# Patient Record
Sex: MALE | Race: WHITE | NOT HISPANIC OR LATINO
[De-identification: names, ages, dates, MRNs, and addresses within clinical notes are randomized per-mention and may not be internally consistent; named-entity substitution may affect disease eponyms.]

---

## 2018-11-07 ENCOUNTER — APPOINTMENT (OUTPATIENT)
Dept: INTERNAL MEDICINE | Facility: CLINIC | Age: 58
End: 2018-11-07
Payer: MEDICAID

## 2018-11-07 VITALS
DIASTOLIC BLOOD PRESSURE: 74 MMHG | SYSTOLIC BLOOD PRESSURE: 130 MMHG | BODY MASS INDEX: 20.44 KG/M2 | WEIGHT: 138 LBS | HEIGHT: 69 IN | HEART RATE: 104 BPM

## 2018-11-07 DIAGNOSIS — Z82.3 FAMILY HISTORY OF STROKE: ICD-10-CM

## 2018-11-07 DIAGNOSIS — M54.16 RADICULOPATHY, LUMBAR REGION: ICD-10-CM

## 2018-11-07 DIAGNOSIS — Z82.0 FAMILY HISTORY OF EPILEPSY AND OTHER DISEASES OF THE NERVOUS SYSTEM: ICD-10-CM

## 2018-11-07 DIAGNOSIS — F17.200 NICOTINE DEPENDENCE, UNSPECIFIED, UNCOMPLICATED: ICD-10-CM

## 2018-11-07 DIAGNOSIS — Z78.9 OTHER SPECIFIED HEALTH STATUS: ICD-10-CM

## 2018-11-07 DIAGNOSIS — Z72.3 LACK OF PHYSICAL EXERCISE: ICD-10-CM

## 2018-11-07 PROCEDURE — 90686 IIV4 VACC NO PRSV 0.5 ML IM: CPT

## 2018-11-07 PROCEDURE — G0008: CPT

## 2018-11-07 PROCEDURE — 36415 COLL VENOUS BLD VENIPUNCTURE: CPT

## 2018-11-07 PROCEDURE — 99204 OFFICE O/P NEW MOD 45 MIN: CPT | Mod: 25

## 2018-11-07 NOTE — PLAN
[FreeTextEntry1] : detailed discussion and instructions given for cervical spine posture, ROM and resistance exercises, muscle relaxation, heat, nsaids prn. Check CXR and C spine for occult process , in light of smoking hx.  and lack of overt traumatic ppt event.

## 2018-11-07 NOTE — HISTORY OF PRESENT ILLNESS
[FreeTextEntry1] : Initial Internal Medicine evaluation at [facility] [de-identified] : 59 y/o describes a main concern about his upper back discomfort x 2 wks ( see below). Otherwise,  no known change in medical status. Significant past hx outlined below

## 2018-11-07 NOTE — PHYSICAL EXAM
[No Acute Distress] : no acute distress [Normal Sclera/Conjunctiva] : normal sclera/conjunctiva [Normal Outer Ear/Nose] : the outer ears and nose were normal in appearance [Normal Oropharynx] : the oropharynx was normal [No Respiratory Distress] : no respiratory distress  [Clear to Auscultation] : lungs were clear to auscultation bilaterally [Normal Rate] : normal rate  [Normal S1, S2] : normal S1 and S2 [No Murmur] : no murmur heard [No Varicosities] : no varicosities [No Edema] : there was no peripheral edema [Soft] : abdomen soft [Non-distended] : non-distended [No Masses] : no abdominal mass palpated [No HSM] : no HSM [Normal Bowel Sounds] : normal bowel sounds [No Hernias] : no hernias [Normal Posterior Cervical Nodes] : no posterior cervical lymphadenopathy [Normal Anterior Cervical Nodes] : no anterior cervical lymphadenopathy [No CVA Tenderness] : no CVA  tenderness [No Spinal Tenderness] : no spinal tenderness [No Joint Swelling] : no joint swelling [Grossly Normal Strength/Tone] : grossly normal strength/tone [Normal Gait] : normal gait [No Focal Deficits] : no focal deficits [Speech Grossly Normal] : speech grossly normal [Normal Affect] : the affect was normal [Alert and Oriented x3] : oriented to person, place, and time [de-identified] : thin  [de-identified] : dental disease [de-identified] : decreased rom [de-identified] : midline incision umbilicus-suprapubic [FreeTextEntry1] : deferred [de-identified] : mild dorsal dextro curve [de-identified] : mult acne scars; scratches and areas of irritation

## 2018-11-07 NOTE — REVIEW OF SYSTEMS
[Patient Intake Form Reviewed] : Patient intake form was reviewed [Negative] : Heme/Lymph [FreeTextEntry3] : glasses near and far

## 2018-11-13 LAB
ALBUMIN SERPL ELPH-MCNC: 4 G/DL
ALP BLD-CCNC: 69 U/L
ALT SERPL-CCNC: 19 U/L
ANION GAP SERPL CALC-SCNC: 17 MMOL/L
AST SERPL-CCNC: 23 U/L
BASOPHILS # BLD AUTO: 0.08 K/UL
BASOPHILS NFR BLD AUTO: 1.2 %
BILIRUB SERPL-MCNC: 0.6 MG/DL
BUN SERPL-MCNC: 8 MG/DL
CALCIUM SERPL-MCNC: 9.6 MG/DL
CHLORIDE SERPL-SCNC: 102 MMOL/L
CHOLEST SERPL-MCNC: 177 MG/DL
CHOLEST/HDLC SERPL: 2.7 RATIO
CO2 SERPL-SCNC: 24 MMOL/L
CREAT SERPL-MCNC: 0.67 MG/DL
EOSINOPHIL # BLD AUTO: 0.07 K/UL
EOSINOPHIL NFR BLD AUTO: 1.1 %
ERYTHROCYTE [SEDIMENTATION RATE] IN BLOOD BY WESTERGREN METHOD: 8 MM/HR
FERRITIN SERPL-MCNC: 230 NG/ML
FOLATE SERPL-MCNC: >20 NG/ML
GLUCOSE SERPL-MCNC: 141 MG/DL
HBA1C MFR BLD HPLC: 5.1 %
HCT VFR BLD CALC: 44.7 %
HDLC SERPL-MCNC: 65 MG/DL
HGB BLD-MCNC: 15.1 G/DL
IMM GRANULOCYTES NFR BLD AUTO: 0.3 %
IRON SATN MFR SERPL: 44 %
IRON SERPL-MCNC: 148 UG/DL
LDLC SERPL CALC-MCNC: 73 MG/DL
LYMPHOCYTES # BLD AUTO: 1.21 K/UL
LYMPHOCYTES NFR BLD AUTO: 18.7 %
MAN DIFF?: NORMAL
MCHC RBC-ENTMCNC: 31.5 PG
MCHC RBC-ENTMCNC: 33.8 GM/DL
MCV RBC AUTO: 93.1 FL
MONOCYTES # BLD AUTO: 0.21 K/UL
MONOCYTES NFR BLD AUTO: 3.3 %
NEUTROPHILS # BLD AUTO: 4.87 K/UL
NEUTROPHILS NFR BLD AUTO: 75.4 %
PLATELET # BLD AUTO: 266 K/UL
POTASSIUM SERPL-SCNC: 3.7 MMOL/L
PROT SERPL-MCNC: 6.3 G/DL
PSA SERPL-MCNC: 0.56 NG/ML
RBC # BLD: 4.8 M/UL
RBC # FLD: 13.4 %
SODIUM SERPL-SCNC: 143 MMOL/L
T4 FREE SERPL-MCNC: 1.2 NG/DL
TIBC SERPL-MCNC: 333 UG/DL
TRIGL SERPL-MCNC: 194 MG/DL
TSH SERPL-ACNC: 0.69 UIU/ML
UIBC SERPL-MCNC: 185 UG/DL
URATE SERPL-MCNC: 6.2 MG/DL
VIT B12 SERPL-MCNC: 524 PG/ML
WBC # FLD AUTO: 6.46 K/UL

## 2018-11-23 ENCOUNTER — OUTPATIENT (OUTPATIENT)
Dept: OUTPATIENT SERVICES | Facility: HOSPITAL | Age: 58
LOS: 1 days | End: 2018-11-23
Payer: MEDICAID

## 2018-11-23 PROCEDURE — 71046 X-RAY EXAM CHEST 2 VIEWS: CPT | Mod: 26

## 2018-11-23 PROCEDURE — 71046 X-RAY EXAM CHEST 2 VIEWS: CPT

## 2018-11-23 PROCEDURE — 72040 X-RAY EXAM NECK SPINE 2-3 VW: CPT | Mod: 26

## 2018-11-23 PROCEDURE — 72040 X-RAY EXAM NECK SPINE 2-3 VW: CPT

## 2018-12-12 ENCOUNTER — APPOINTMENT (OUTPATIENT)
Dept: INTERNAL MEDICINE | Facility: CLINIC | Age: 58
End: 2018-12-12
Payer: MEDICAID

## 2018-12-12 VITALS
BODY MASS INDEX: 20.38 KG/M2 | WEIGHT: 138 LBS | SYSTOLIC BLOOD PRESSURE: 130 MMHG | HEART RATE: 80 BPM | DIASTOLIC BLOOD PRESSURE: 74 MMHG

## 2018-12-12 DIAGNOSIS — Z98.890 OTHER SPECIFIED POSTPROCEDURAL STATES: ICD-10-CM

## 2018-12-12 DIAGNOSIS — Z87.898 PERSONAL HISTORY OF OTHER SPECIFIED CONDITIONS: ICD-10-CM

## 2018-12-12 DIAGNOSIS — Z92.29 PERSONAL HISTORY OF OTHER DRUG THERAPY: ICD-10-CM

## 2018-12-12 PROCEDURE — 99214 OFFICE O/P EST MOD 30 MIN: CPT

## 2018-12-13 PROBLEM — Z92.29 HISTORY OF INFLUENZA VACCINATION: Status: RESOLVED | Noted: 2018-11-07 | Resolved: 2018-12-13

## 2018-12-13 PROBLEM — Z87.898 HISTORY OF TACHYCARDIA: Status: RESOLVED | Noted: 2018-11-07 | Resolved: 2018-12-13

## 2018-12-13 PROBLEM — Z98.890 HISTORY OF COLONOSCOPY: Status: RESOLVED | Noted: 2018-12-13 | Resolved: 2018-12-13

## 2018-12-13 PROBLEM — Z87.898 HISTORY OF WEIGHT LOSS: Status: RESOLVED | Noted: 2018-11-07 | Resolved: 2018-12-13

## 2018-12-13 NOTE — HISTORY OF PRESENT ILLNESS
[FreeTextEntry1] : f/u on neck and back sxs [de-identified] : Back and neck sxs improved with exercises and posture/bed changes. Reviewed xray findings:\par 1. C spine : degen arthritic changes C3-6; c/w sxs\par 2. CXR: dorsal spine arthritic changes as well. There is a report of a lerge right sided possible cyst like structure. This was not evident to me on review of the films. I called the radiologist who was not available and left a message for her. The films were reviewed by another radiologist who concurred with my finding of no cyst on either the AP or lat film. There are no PE changes, hx,  or sxs relative to this finding

## 2018-12-13 NOTE — PHYSICAL EXAM
[No Respiratory Distress] : no respiratory distress  [Clear to Auscultation] : lungs were clear to auscultation bilaterally [de-identified] : improved range in motion; rhomboid muscle tightness relieved

## 2019-02-27 ENCOUNTER — APPOINTMENT (OUTPATIENT)
Dept: INTERNAL MEDICINE | Facility: CLINIC | Age: 59
End: 2019-02-27
Payer: MEDICAID

## 2019-02-27 VITALS
HEIGHT: 69 IN | HEART RATE: 90 BPM | BODY MASS INDEX: 20.44 KG/M2 | DIASTOLIC BLOOD PRESSURE: 78 MMHG | WEIGHT: 138 LBS | SYSTOLIC BLOOD PRESSURE: 120 MMHG

## 2019-02-27 DIAGNOSIS — R93.89 ABNORMAL FINDINGS ON DIAGNOSTIC IMAGING OF OTHER SPECIFIED BODY STRUCTURES: ICD-10-CM

## 2019-02-27 DIAGNOSIS — L40.9 PSORIASIS, UNSPECIFIED: ICD-10-CM

## 2019-02-27 DIAGNOSIS — M47.814 SPONDYLOSIS W/OUT MYELOPATHY OR RADICULOPATHY, THORACIC REGION: ICD-10-CM

## 2019-02-27 PROCEDURE — 99214 OFFICE O/P EST MOD 30 MIN: CPT

## 2019-02-27 NOTE — HISTORY OF PRESENT ILLNESS
[FreeTextEntry1] : medical f/u - few issues [de-identified] : 1. psoriasis on his shins is sometimes flaking and itching\par 2. low back stiffness. Has been effectively treated with a lumbar support belt which is not keeping its Velcro close. a replacement is not available through his insurance plan, as they are over-the-counter. However, if the Velcro was cleaned, it would work effectively\par 3. 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 Neck and upper back symptoms have resolved with exercises - to continue

## 2019-02-27 NOTE — PHYSICAL EXAM
[Normal Sclera/Conjunctiva] : normal sclera/conjunctiva [No Respiratory Distress] : no respiratory distress  [Clear to Auscultation] : lungs were clear to auscultation bilaterally [de-identified] : poor grooming; odor from body/clothing [de-identified] : improved range in motion; rhomboid muscle tightness relieved [de-identified] : stiffness with full range of motion [de-identified] : psoriatic plaque bilaterally, anterior shins

## 2019-02-27 NOTE — PLAN
[FreeTextEntry1] : 1. use lumbar support as needed.\par 2. For psoriasis, triamcinolone cream, apply after shower each morning and can reapply in the evening before sleep. Maintain good body hygiene.\par 3. When sitting for long time due to neck, shoulder, and back stretches and range of motion to prevent stiffness\par 4. cautioned re personal hygeine and clothing as he starts new job working in close quarters with others

## 2019-06-05 ENCOUNTER — APPOINTMENT (OUTPATIENT)
Dept: INTERNAL MEDICINE | Facility: CLINIC | Age: 59
End: 2019-06-05
Payer: MEDICAID

## 2019-06-05 VITALS
HEART RATE: 105 BPM | HEIGHT: 69 IN | WEIGHT: 132 LBS | DIASTOLIC BLOOD PRESSURE: 84 MMHG | BODY MASS INDEX: 19.55 KG/M2 | SYSTOLIC BLOOD PRESSURE: 125 MMHG

## 2019-06-05 VITALS — SYSTOLIC BLOOD PRESSURE: 112 MMHG | HEART RATE: 113 BPM | DIASTOLIC BLOOD PRESSURE: 71 MMHG

## 2019-06-05 PROCEDURE — 99214 OFFICE O/P EST MOD 30 MIN: CPT

## 2019-06-06 NOTE — PHYSICAL EXAM
[No Acute Distress] : no acute distress [Normal Sclera/Conjunctiva] : normal sclera/conjunctiva [No Respiratory Distress] : no respiratory distress  [Clear to Auscultation] : lungs were clear to auscultation bilaterally [Regular Rhythm] : with a regular rhythm [No Murmur] : no murmur heard [No Edema] : there was no peripheral edema [Non Tender] : non-tender [Soft] : abdomen soft [Non-distended] : non-distended [No Masses] : no abdominal mass palpated [No HSM] : no HSM [No CVA Tenderness] : no CVA  tenderness [Normal Affect] : the affect was normal [Normal Mood] : the mood was normal [Normal Insight/Judgement] : insight and judgment were intact [de-identified] : looks about the same; no orthostatic symptoms [de-identified] : thin firm abd with sl hyper active BS of normal pitch. Non tender on direct or rebound [de-identified] : similar tachycardia to last time [de-identified] : slight gittery but w/o fever or drug / alcohol use/withdrawal  [de-identified] : not tenting

## 2019-06-06 NOTE — HISTORY OF PRESENT ILLNESS
[de-identified] : he had been doing very well at work.Unfortunately, the chart came to in and last month and his diet and fluid excess or limited. He is uncertain what was consumed on Thursday, May 30.However, that night he noted the onset of severe crampy abdominal pain and profuse, non-bloody, watery diarrhea. He felt fevish. There was no nausea or vomiting, but he did not eat. He was unable to leave his home due to the numerous bowel movements. The cramping and diarrhea persisted over the next 4 days and only began to cleark, enabling him to walk 3 blocks, ovary, to this office. [FreeTextEntry1] : intestinal upset

## 2019-07-05 ENCOUNTER — RX CHANGE (OUTPATIENT)
Age: 59
End: 2019-07-05

## 2019-07-15 ENCOUNTER — CLINICAL ADVICE (OUTPATIENT)
Age: 59
End: 2019-07-15

## 2019-07-16 ENCOUNTER — OUTPATIENT (OUTPATIENT)
Dept: OUTPATIENT SERVICES | Facility: HOSPITAL | Age: 59
LOS: 1 days | End: 2019-07-16
Payer: MEDICAID

## 2019-07-16 PROCEDURE — 72050 X-RAY EXAM NECK SPINE 4/5VWS: CPT

## 2019-07-16 PROCEDURE — 72050 X-RAY EXAM NECK SPINE 4/5VWS: CPT | Mod: 26

## 2019-10-30 ENCOUNTER — APPOINTMENT (OUTPATIENT)
Dept: INTERNAL MEDICINE | Facility: CLINIC | Age: 59
End: 2019-10-30
Payer: MEDICAID

## 2019-10-30 VITALS
HEART RATE: 74 BPM | DIASTOLIC BLOOD PRESSURE: 73 MMHG | SYSTOLIC BLOOD PRESSURE: 118 MMHG | BODY MASS INDEX: 19.55 KG/M2 | WEIGHT: 132 LBS | HEIGHT: 69 IN

## 2019-10-30 DIAGNOSIS — K52.9 NONINFECTIVE GASTROENTERITIS AND COLITIS, UNSPECIFIED: ICD-10-CM

## 2019-10-30 PROCEDURE — G0008: CPT

## 2019-10-30 PROCEDURE — 36415 COLL VENOUS BLD VENIPUNCTURE: CPT

## 2019-10-30 PROCEDURE — 99214 OFFICE O/P EST MOD 30 MIN: CPT | Mod: 25

## 2019-10-30 PROCEDURE — 90686 IIV4 VACC NO PRSV 0.5 ML IM: CPT

## 2019-11-01 PROBLEM — K52.9 GASTROENTERITIS, ACUTE: Status: RESOLVED | Noted: 2019-06-05 | Resolved: 2019-11-01

## 2019-11-01 NOTE — HISTORY OF PRESENT ILLNESS
[FreeTextEntry1] : medical f/u [de-identified] : 1. Has moved to [facility] shelter with both room and board in the [facility] and then to [facility] in 12/2019. Has  assisting him. 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 Needs FLu shot, testing for TB for purposes of shelter. \par 2. Has physical therapy weekly - helpful with neck and upper back; needs referral for lower back therapy - stiffness and h/o sciatica

## 2019-11-01 NOTE — PHYSICAL EXAM
[Well-Appearing] : well-appearing [Normal Sclera/Conjunctiva] : normal sclera/conjunctiva [No Respiratory Distress] : no respiratory distress  [Clear to Auscultation] : lungs were clear to auscultation bilaterally [Normal Rate] : normal rate  [No Edema] : there was no peripheral edema [Normal] : soft, non-tender, non-distended, no masses palpated, no HSM and normal bowel sounds [No CVA Tenderness] : no CVA  tenderness [No Spinal Tenderness] : no spinal tenderness [Grossly Normal Strength/Tone] : grossly normal strength/tone [No Focal Deficits] : no focal deficits [Speech Grossly Normal] : speech grossly normal [Normal Mood] : the mood was normal [de-identified] : reasonable rom [de-identified] : accepts his situation and working to get better

## 2019-11-02 LAB
M TB IFN-G BLD-IMP: NEGATIVE
QUANTIFERON TB PLUS MITOGEN MINUS NIL: >10 IU/ML
QUANTIFERON TB PLUS NIL: 0.03 IU/ML
QUANTIFERON TB PLUS TB1 MINUS NIL: 0 IU/ML
QUANTIFERON TB PLUS TB2 MINUS NIL: 0 IU/ML

## 2020-01-21 ENCOUNTER — APPOINTMENT (OUTPATIENT)
Dept: INTERNAL MEDICINE | Facility: CLINIC | Age: 60
End: 2020-01-21
Payer: MEDICAID

## 2020-01-21 VITALS
WEIGHT: 145 LBS | SYSTOLIC BLOOD PRESSURE: 122 MMHG | DIASTOLIC BLOOD PRESSURE: 78 MMHG | HEART RATE: 82 BPM | HEIGHT: 70 IN | BODY MASS INDEX: 20.76 KG/M2

## 2020-01-21 DIAGNOSIS — Z00.00 ENCOUNTER FOR GENERAL ADULT MEDICAL EXAMINATION W/OUT ABNORMAL FINDINGS: ICD-10-CM

## 2020-01-21 DIAGNOSIS — M54.5 LOW BACK PAIN: ICD-10-CM

## 2020-01-21 DIAGNOSIS — G89.29 LOW BACK PAIN: ICD-10-CM

## 2020-01-21 PROCEDURE — 99214 OFFICE O/P EST MOD 30 MIN: CPT | Mod: 25

## 2020-01-21 PROCEDURE — 36415 COLL VENOUS BLD VENIPUNCTURE: CPT

## 2020-01-21 RX ORDER — NAPROXEN 500 MG/1
500 TABLET ORAL
Qty: 60 | Refills: 5 | Status: DISCONTINUED | COMMUNITY
Start: 2019-07-15 | End: 2019-11-21

## 2020-01-21 NOTE — PHYSICAL EXAM
[Well-Appearing] : well-appearing [No Respiratory Distress] : no respiratory distress  [Normal Rate] : normal rate  [Regular Rhythm] : with a regular rhythm [No Murmur] : no murmur heard [No Edema] : there was no peripheral edema [Soft] : abdomen soft [Non Tender] : non-tender [No HSM] : no HSM [No CVA Tenderness] : no CVA  tenderness [No Spinal Tenderness] : no spinal tenderness [Grossly Normal Strength/Tone] : grossly normal strength/tone [No Skin Lesions] : no skin lesions [No Focal Deficits] : no focal deficits [Speech Grossly Normal] : speech grossly normal [Alert and Oriented x3] : oriented to person, place, and time [de-identified] : better grooming than at onset 1 year ago; has gained weight from better food access [de-identified] : missing teeth [de-identified] : stiffness and dec ROM with extension, lateral bending and twisting movemtn [de-identified] : decreased lordotic lumbar curve [de-identified] : flexible back, hip, knee movements [de-identified] : anxious pressured affect; understands well but repetitive in thought expression; seemingly understands and with insight but perhaps situation so advanced that can't act on it

## 2020-01-21 NOTE — HISTORY OF PRESENT ILLNESS
[FreeTextEntry1] : medical f/u [de-identified] : From a medical standpoint, his active medical conditions are intermittent pain and stiffness in his neck from arthritic changes. There is an element of lumbar symptoms as well. He obtains benefit from Tylenol, heat packs, and physical therapy.\par most of his stress, which aggravates the spine symptoms, comes from his socioeconomic situation. He is without a job. He prefers one of the type. 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 He believeshis spine symptoms would interfere with physical labor abilities. He lives in a shelter and is receiving public assistance stipend and food stamps to cover beyond the free meals and rent, laundry, and bath access provided by the shelter.

## 2020-01-21 NOTE — ASSESSMENT
[FreeTextEntry1] : 30 minutes spent face to face with the patient in elicitation of symptoms and related factors, analysis and summation of medical conditions, explaining to,  and outlining management plans for, the patient.\par \par His improvement from being disheveled and underweight reflects success of  he receives. His not seeking full range of job market in current high employment economy suggests deficient judgement to manage life on his own

## 2020-01-21 NOTE — PLAN
[FreeTextEntry1] : venipuncture performed by me in my exam room\par \par update labs\par referrals for PT given\par Note to Shelter as described.

## 2020-01-24 LAB
ALBUMIN SERPL ELPH-MCNC: 4.8 G/DL
ALP BLD-CCNC: 83 U/L
ALT SERPL-CCNC: 23 U/L
ANION GAP SERPL CALC-SCNC: 13 MMOL/L
AST SERPL-CCNC: 22 U/L
BASOPHILS # BLD AUTO: 0.09 K/UL
BASOPHILS NFR BLD AUTO: 1 %
BILIRUB SERPL-MCNC: 0.3 MG/DL
BUN SERPL-MCNC: 13 MG/DL
CALCIUM SERPL-MCNC: 10.2 MG/DL
CHLORIDE SERPL-SCNC: 98 MMOL/L
CO2 SERPL-SCNC: 28 MMOL/L
CREAT SERPL-MCNC: 0.79 MG/DL
EOSINOPHIL # BLD AUTO: 0.27 K/UL
EOSINOPHIL NFR BLD AUTO: 2.9 %
ESTIMATED AVERAGE GLUCOSE: 111 MG/DL
GLUCOSE SERPL-MCNC: 89 MG/DL
HBA1C MFR BLD HPLC: 5.5 %
HBV CORE IGG+IGM SER QL: NONREACTIVE
HBV SURFACE AB SERPL IA-ACNC: <3 MIU/ML
HBV SURFACE AG SER QL: NONREACTIVE
HCT VFR BLD CALC: 49 %
HCV AB SER QL: NONREACTIVE
HCV S/CO RATIO: 0.32 S/CO
HGB BLD-MCNC: 15.2 G/DL
HIV1+2 AB SPEC QL IA.RAPID: NONREACTIVE
IMM GRANULOCYTES NFR BLD AUTO: 0.3 %
LYMPHOCYTES # BLD AUTO: 1.97 K/UL
LYMPHOCYTES NFR BLD AUTO: 21.4 %
MAN DIFF?: NORMAL
MCHC RBC-ENTMCNC: 28.7 PG
MCHC RBC-ENTMCNC: 31 GM/DL
MCV RBC AUTO: 92.5 FL
MONOCYTES # BLD AUTO: 0.54 K/UL
MONOCYTES NFR BLD AUTO: 5.9 %
NEUTROPHILS # BLD AUTO: 6.32 K/UL
NEUTROPHILS NFR BLD AUTO: 68.5 %
PLATELET # BLD AUTO: 412 K/UL
POTASSIUM SERPL-SCNC: 4.1 MMOL/L
PROT SERPL-MCNC: 7.2 G/DL
RBC # BLD: 5.3 M/UL
RBC # FLD: 14.6 %
SODIUM SERPL-SCNC: 139 MMOL/L
T PALLIDUM AB SER QL IA: NEGATIVE
URATE SERPL-MCNC: 6.5 MG/DL
WBC # FLD AUTO: 9.22 K/UL

## 2020-03-24 ENCOUNTER — APPOINTMENT (OUTPATIENT)
Dept: INTERNAL MEDICINE | Facility: CLINIC | Age: 60
End: 2020-03-24
Payer: MEDICAID

## 2020-03-24 VITALS
HEART RATE: 80 BPM | WEIGHT: 146 LBS | HEIGHT: 70 IN | DIASTOLIC BLOOD PRESSURE: 90 MMHG | SYSTOLIC BLOOD PRESSURE: 142 MMHG | BODY MASS INDEX: 20.9 KG/M2

## 2020-03-24 DIAGNOSIS — J30.2 OTHER SEASONAL ALLERGIC RHINITIS: ICD-10-CM

## 2020-03-24 DIAGNOSIS — M48.9 SPONDYLOPATHY, UNSPECIFIED: ICD-10-CM

## 2020-03-24 PROCEDURE — 36415 COLL VENOUS BLD VENIPUNCTURE: CPT

## 2020-03-24 PROCEDURE — 99213 OFFICE O/P EST LOW 20 MIN: CPT | Mod: 25

## 2020-03-24 NOTE — PHYSICAL EXAM
[Well-Appearing] : well-appearing [Normal Sclera/Conjunctiva] : normal sclera/conjunctiva [Normal Outer Ear/Nose] : the outer ears and nose were normal in appearance [Normal Oropharynx] : the oropharynx was normal [No Respiratory Distress] : no respiratory distress  [Normal] : normal rate, regular rhythm, normal S1 and S2 and no murmur heard [No Edema] : there was no peripheral edema [Soft] : abdomen soft [Non Tender] : non-tender [No HSM] : no HSM [No CVA Tenderness] : no CVA  tenderness [No Spinal Tenderness] : no spinal tenderness [Grossly Normal Strength/Tone] : grossly normal strength/tone [No Skin Lesions] : no skin lesions [No Focal Deficits] : no focal deficits [de-identified] : better groomed than in the past [de-identified] : No adenopathy; thyroid not palpable.

## 2020-03-24 NOTE — PLAN
[FreeTextEntry1] : venipuncture performed by me in my exam room\par Further management and instructions to patient will follow results of pending studies.\par Complete PE forms for pt and fax to homecare agency with copy of labs to patient

## 2020-03-24 NOTE — HISTORY OF PRESENT ILLNESS
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 Voice sounds normal and no current infection sxs.

## 2020-03-27 LAB
AMPHET UR-MCNC: NEGATIVE
BARBITURATES UR-MCNC: NEGATIVE
BENZODIAZ UR-MCNC: NEGATIVE
COCAINE METAB.OTHER UR-MCNC: NEGATIVE
CREATININE, URINE: 166.8 MG/DL
DRUG ABUSE PANEL-9, SERUM: NORMAL
METHADONE UR-MCNC: NEGATIVE
METHAQUALONE UR-MCNC: NEGATIVE
MEV IGG FLD QL IA: 80.9 AU/ML
MEV IGG+IGM SER-IMP: POSITIVE
OPIATES UR-MCNC: NEGATIVE
PCP UR-MCNC: NEGATIVE
PROPOXYPH UR QL: NEGATIVE
RUBV IGG FLD-ACNC: 11.6 INDEX
RUBV IGG SER-IMP: POSITIVE
THC UR QL: NEGATIVE

## 2020-05-19 ENCOUNTER — APPOINTMENT (OUTPATIENT)
Dept: INTERNAL MEDICINE | Facility: CLINIC | Age: 60
End: 2020-05-19
Payer: MEDICAID

## 2020-05-19 VITALS
OXYGEN SATURATION: 98 % | HEART RATE: 90 BPM | DIASTOLIC BLOOD PRESSURE: 81 MMHG | BODY MASS INDEX: 21.43 KG/M2 | TEMPERATURE: 98.4 F | HEIGHT: 69.5 IN | SYSTOLIC BLOOD PRESSURE: 128 MMHG | WEIGHT: 148 LBS

## 2020-05-19 PROCEDURE — 99213 OFFICE O/P EST LOW 20 MIN: CPT

## 2020-05-20 RX ORDER — FLUTICASONE PROPIONATE 50 UG/1
50 SPRAY, METERED NASAL
Qty: 16 | Refills: 0 | Status: COMPLETED | COMMUNITY
Start: 2020-03-20

## 2020-05-20 RX ORDER — GUAIFENESIN 200 MG/10ML
100 SOLUTION ORAL
Qty: 118 | Refills: 0 | Status: COMPLETED | COMMUNITY
Start: 2020-03-20

## 2020-05-20 NOTE — PHYSICAL EXAM
[Well-Appearing] : well-appearing [Normal Sclera/Conjunctiva] : normal sclera/conjunctiva [Soft] : abdomen soft [No Edema] : there was no peripheral edema [Non Tender] : non-tender [No HSM] : no HSM [Normal Bowel Sounds] : normal bowel sounds [No CVA Tenderness] : no CVA  tenderness [No Spinal Tenderness] : no spinal tenderness [Grossly Normal Strength/Tone] : grossly normal strength/tone [No Skin Lesions] : no skin lesions [No Focal Deficits] : no focal deficits [Normal] : affect was normal and insight and judgment were intact [de-identified] : No adenopathy; thyroid not palpable. [de-identified] : resolving ecchymosis mid right post-lat side with localized tenderness to pressure there; otherwise chest wall is normal. Good repiratory excursion and normal BS on right and left; no atelectatic, rub, or other repiratory changes. Soreness with some twisting movements, coughing, or hard breathing.

## 2020-05-20 NOTE — ASSESSMENT
[FreeTextEntry1] : VSS; exam w/o alarm changes; patient reports improvement compared to onset and also compared to day of urgent care xray. Therefore, no need for further  xrays of any type at this time. Would need re-evaluate if condtion changes course and new problems arise.

## 2020-05-20 NOTE — HISTORY OF PRESENT ILLNESS
[FreeTextEntry1] : f/u to rib fracture [de-identified] : 5/9/20: got out of shower and then slipped on wet bathroom floor in [employer] used by the shelter system. Fell back and hit r posterior rib cage on where the shower enclosure meets the bottom of the opened of shower door - Urgent Care with stable VS and  CXR + Right 4,5,6,7, ribs fx laterally and extrapleural hematoma. Has used 20 600mg Ibuprofen + Tylenol since then and requests refill. No respiratory sxs, fever, or chills. Senses improvement compared to 1 week ago. Still has residual soreness to palpation of the injury area, and some pleuritic sxs with deep inspiration and some movements. No cough or sputum production.

## 2020-05-20 NOTE — PLAN
[FreeTextEntry1] : 1. take ibuprofen only when needed, with food, and can get extra boost of relief if Tylenol used at same time.\par 2. Cautioned about potential for UGI sxs from NSAIDs\par 3. Heating may make more comfortable.\par 4. 6-8 wk recovery period anticipated\par 5. RV if situation changes for the worse.

## 2020-10-01 ENCOUNTER — RX RENEWAL (OUTPATIENT)
Age: 60
End: 2020-10-01

## 2020-10-13 ENCOUNTER — APPOINTMENT (OUTPATIENT)
Dept: INTERNAL MEDICINE | Facility: CLINIC | Age: 60
End: 2020-10-13
Payer: MEDICAID

## 2020-10-13 VITALS
WEIGHT: 150 LBS | HEART RATE: 100 BPM | BODY MASS INDEX: 21.72 KG/M2 | SYSTOLIC BLOOD PRESSURE: 122 MMHG | HEIGHT: 69.5 IN | DIASTOLIC BLOOD PRESSURE: 88 MMHG

## 2020-10-13 DIAGNOSIS — Z02.1 ENCOUNTER FOR PRE-EMPLOYMENT EXAMINATION: ICD-10-CM

## 2020-10-13 DIAGNOSIS — Z11.7 ENC FOR TESTING FOR LATENT TUBERCULOSIS INFECTION: ICD-10-CM

## 2020-10-13 DIAGNOSIS — F48.9 NONPSYCHOTIC MENTAL DISORDER, UNSPECIFIED: ICD-10-CM

## 2020-10-13 DIAGNOSIS — S22.41XD MULTIPLE FRACTURES OF RIBS, RIGHT SIDE, SUBSEQUENT ENCOUNTER FOR FRACTURE WITH ROUTINE HEALING: ICD-10-CM

## 2020-10-13 DIAGNOSIS — Z23 ENCOUNTER FOR IMMUNIZATION: ICD-10-CM

## 2020-10-13 PROCEDURE — 99213 OFFICE O/P EST LOW 20 MIN: CPT | Mod: 25

## 2020-10-13 PROCEDURE — G0008: CPT

## 2020-10-13 PROCEDURE — 90686 IIV4 VACC NO PRSV 0.5 ML IM: CPT

## 2020-10-16 PROBLEM — Z02.1 PRE-EMPLOYMENT DRUG SCREENING: Status: RESOLVED | Noted: 2020-03-24 | Resolved: 2020-10-16

## 2020-10-16 PROBLEM — Z23 NEED FOR VACCINATION: Status: ACTIVE | Noted: 2019-11-01

## 2020-10-16 PROBLEM — Z11.7 ENCOUNTER FOR TESTING FOR LATENT TUBERCULOSIS INFECTION: Status: RESOLVED | Noted: 2019-11-01 | Resolved: 2020-10-16

## 2020-10-16 PROBLEM — Z02.1 PRE-EMPLOYMENT EXAMINATION: Status: RESOLVED | Noted: 2020-03-24 | Resolved: 2020-10-16

## 2020-10-16 PROBLEM — F48.9: Status: ACTIVE | Noted: 2020-01-21

## 2020-10-16 PROBLEM — S22.41XD CLOSED FRACTURE OF MULTIPLE RIBS OF RIGHT SIDE WITH ROUTINE HEALING, SUBSEQUENT ENCOUNTER: Status: RESOLVED | Noted: 2020-05-19 | Resolved: 2020-10-16

## 2020-10-16 NOTE — HISTORY OF PRESENT ILLNESS
[FreeTextEntry1] : update medical status [de-identified] : Life has improved. Although not working, he was able to get his own room and kitchenette in an [employer] and move out of shelters. Has extensive  that cover all his monetary needs. Perhaps related, he now feels well and for the first time does not have neck and back sxs. does use tylenol w/o excess and did do physical therapy.

## 2020-10-16 NOTE — PLAN
[FreeTextEntry1] : encouraged healthy habits and stability of routine in current favorable conditions

## 2020-10-16 NOTE — PHYSICAL EXAM
[Normal Sclera/Conjunctiva] : normal sclera/conjunctiva [No Respiratory Distress] : no respiratory distress  [Clear to Auscultation] : lungs were clear to auscultation bilaterally [No Edema] : there was no peripheral edema [Soft] : abdomen soft [No HSM] : no HSM [Normal Gait] : normal gait [Grossly Normal Strength/Tone] : grossly normal strength/tone [de-identified] : scarring and excoriated changes w/o active skin condition [Normal] : affect was normal and insight and judgment were intact [de-identified] : better groomed

## 2020-12-10 ENCOUNTER — RX RENEWAL (OUTPATIENT)
Age: 60
End: 2020-12-10

## 2021-01-20 ENCOUNTER — APPOINTMENT (OUTPATIENT)
Dept: INTERNAL MEDICINE | Facility: CLINIC | Age: 61
End: 2021-01-20

## 2021-01-21 ENCOUNTER — RX RENEWAL (OUTPATIENT)
Age: 61
End: 2021-01-21

## 2021-02-01 ENCOUNTER — OUTPATIENT (OUTPATIENT)
Dept: OUTPATIENT SERVICES | Facility: HOSPITAL | Age: 61
LOS: 1 days | End: 2021-02-01
Payer: MEDICAID

## 2021-02-11 ENCOUNTER — NON-APPOINTMENT (OUTPATIENT)
Age: 61
End: 2021-02-11

## 2021-02-12 ENCOUNTER — NON-APPOINTMENT (OUTPATIENT)
Age: 61
End: 2021-02-12

## 2021-02-20 ENCOUNTER — INPATIENT (INPATIENT)
Facility: HOSPITAL | Age: 61
LOS: 6 days | Discharge: HOME | DRG: 871 | End: 2021-02-27
Attending: INTERNAL MEDICINE | Admitting: INTERNAL MEDICINE
Payer: COMMERCIAL

## 2021-02-20 VITALS
DIASTOLIC BLOOD PRESSURE: 73 MMHG | WEIGHT: 149.91 LBS | HEART RATE: 121 BPM | TEMPERATURE: 97 F | SYSTOLIC BLOOD PRESSURE: 128 MMHG | OXYGEN SATURATION: 87 % | HEIGHT: 70 IN | RESPIRATION RATE: 24 BRPM

## 2021-02-20 LAB
A1C WITH ESTIMATED AVERAGE GLUCOSE RESULT: 5.1 % — SIGNIFICANT CHANGE UP (ref 4–5.6)
ALBUMIN SERPL ELPH-MCNC: 3 G/DL — LOW (ref 3.3–5)
ALBUMIN SERPL ELPH-MCNC: 3.2 G/DL — LOW (ref 3.3–5)
ALBUMIN SERPL ELPH-MCNC: 3.2 G/DL — LOW (ref 3.3–5)
ALP SERPL-CCNC: 129 U/L — HIGH (ref 40–120)
ALP SERPL-CCNC: 147 U/L — HIGH (ref 40–120)
ALP SERPL-CCNC: 149 U/L — HIGH (ref 40–120)
ALT FLD-CCNC: 23 U/L — SIGNIFICANT CHANGE UP (ref 10–45)
ALT FLD-CCNC: 23 U/L — SIGNIFICANT CHANGE UP (ref 10–45)
ALT FLD-CCNC: 24 U/L — SIGNIFICANT CHANGE UP (ref 10–45)
ANION GAP SERPL CALC-SCNC: 14 MMOL/L — SIGNIFICANT CHANGE UP (ref 5–17)
ANION GAP SERPL CALC-SCNC: 14 MMOL/L — SIGNIFICANT CHANGE UP (ref 5–17)
ANION GAP SERPL CALC-SCNC: 15 MMOL/L — SIGNIFICANT CHANGE UP (ref 5–17)
APPEARANCE UR: CLEAR — SIGNIFICANT CHANGE UP
APTT BLD: 32.6 SEC — SIGNIFICANT CHANGE UP (ref 27.5–35.5)
APTT BLD: 35 SEC — SIGNIFICANT CHANGE UP (ref 27.5–35.5)
APTT BLD: 35.7 SEC — HIGH (ref 27.5–35.5)
AST SERPL-CCNC: 22 U/L — SIGNIFICANT CHANGE UP (ref 10–40)
AST SERPL-CCNC: 23 U/L — SIGNIFICANT CHANGE UP (ref 10–40)
AST SERPL-CCNC: 30 U/L — SIGNIFICANT CHANGE UP (ref 10–40)
BASE EXCESS BLDV CALC-SCNC: 2.3 MMOL/L — SIGNIFICANT CHANGE UP
BASOPHILS # BLD AUTO: 0.03 K/UL — SIGNIFICANT CHANGE UP (ref 0–0.2)
BASOPHILS # BLD AUTO: 0.06 K/UL — SIGNIFICANT CHANGE UP (ref 0–0.2)
BASOPHILS NFR BLD AUTO: 0.1 % — SIGNIFICANT CHANGE UP (ref 0–2)
BASOPHILS NFR BLD AUTO: 0.3 % — SIGNIFICANT CHANGE UP (ref 0–2)
BILIRUB SERPL-MCNC: 0.4 MG/DL — SIGNIFICANT CHANGE UP (ref 0.2–1.2)
BILIRUB SERPL-MCNC: 0.4 MG/DL — SIGNIFICANT CHANGE UP (ref 0.2–1.2)
BILIRUB SERPL-MCNC: 0.5 MG/DL — SIGNIFICANT CHANGE UP (ref 0.2–1.2)
BILIRUB UR-MCNC: NEGATIVE — SIGNIFICANT CHANGE UP
BLD GP AB SCN SERPL QL: NEGATIVE — SIGNIFICANT CHANGE UP
BUN SERPL-MCNC: 10 MG/DL — SIGNIFICANT CHANGE UP (ref 7–23)
BUN SERPL-MCNC: 12 MG/DL — SIGNIFICANT CHANGE UP (ref 7–23)
BUN SERPL-MCNC: 9 MG/DL — SIGNIFICANT CHANGE UP (ref 7–23)
CA-I SERPL-SCNC: 1.13 MMOL/L — SIGNIFICANT CHANGE UP (ref 1.12–1.3)
CALCIUM SERPL-MCNC: 8.8 MG/DL — SIGNIFICANT CHANGE UP (ref 8.4–10.5)
CALCIUM SERPL-MCNC: 8.8 MG/DL — SIGNIFICANT CHANGE UP (ref 8.4–10.5)
CALCIUM SERPL-MCNC: 9 MG/DL — SIGNIFICANT CHANGE UP (ref 8.4–10.5)
CHLORIDE SERPL-SCNC: 95 MMOL/L — LOW (ref 96–108)
CHLORIDE SERPL-SCNC: 98 MMOL/L — SIGNIFICANT CHANGE UP (ref 96–108)
CHLORIDE SERPL-SCNC: 99 MMOL/L — SIGNIFICANT CHANGE UP (ref 96–108)
CHOLEST SERPL-MCNC: 106 MG/DL — SIGNIFICANT CHANGE UP
CK MB CFR SERPL CALC: 2.9 NG/ML — SIGNIFICANT CHANGE UP (ref 0–6.7)
CK SERPL-CCNC: 242 U/L — HIGH (ref 30–200)
CO2 SERPL-SCNC: 24 MMOL/L — SIGNIFICANT CHANGE UP (ref 22–31)
CO2 SERPL-SCNC: 25 MMOL/L — SIGNIFICANT CHANGE UP (ref 22–31)
CO2 SERPL-SCNC: 27 MMOL/L — SIGNIFICANT CHANGE UP (ref 22–31)
COLOR SPEC: YELLOW — SIGNIFICANT CHANGE UP
CREAT SERPL-MCNC: 0.56 MG/DL — SIGNIFICANT CHANGE UP (ref 0.5–1.3)
CREAT SERPL-MCNC: 0.64 MG/DL — SIGNIFICANT CHANGE UP (ref 0.5–1.3)
CREAT SERPL-MCNC: 0.68 MG/DL — SIGNIFICANT CHANGE UP (ref 0.5–1.3)
CRP SERPL-MCNC: 27.96 MG/DL — HIGH (ref 0–0.4)
D DIMER BLD IA.RAPID-MCNC: 544 NG/ML DDU — HIGH
DIFF PNL FLD: NEGATIVE — SIGNIFICANT CHANGE UP
EOSINOPHIL # BLD AUTO: 0 K/UL — SIGNIFICANT CHANGE UP (ref 0–0.5)
EOSINOPHIL # BLD AUTO: 0.1 K/UL — SIGNIFICANT CHANGE UP (ref 0–0.5)
EOSINOPHIL NFR BLD AUTO: 0 % — SIGNIFICANT CHANGE UP (ref 0–6)
EOSINOPHIL NFR BLD AUTO: 0.5 % — SIGNIFICANT CHANGE UP (ref 0–6)
ESTIMATED AVERAGE GLUCOSE: 100 MG/DL — SIGNIFICANT CHANGE UP (ref 68–114)
FERRITIN SERPL-MCNC: 701 NG/ML — HIGH (ref 30–400)
FERRITIN SERPL-MCNC: 785 NG/ML — HIGH (ref 30–400)
FIBRINOGEN PPP-MCNC: 804 MG/DL — HIGH (ref 258–438)
GAS PNL BLDV: 135 MMOL/L — LOW (ref 138–146)
GAS PNL BLDV: SIGNIFICANT CHANGE UP
GAS PNL BLDV: SIGNIFICANT CHANGE UP
GLUCOSE SERPL-MCNC: 124 MG/DL — HIGH (ref 70–99)
GLUCOSE SERPL-MCNC: 157 MG/DL — HIGH (ref 70–99)
GLUCOSE SERPL-MCNC: 165 MG/DL — HIGH (ref 70–99)
GLUCOSE UR QL: NEGATIVE — SIGNIFICANT CHANGE UP
HCO3 BLDV-SCNC: 28 MMOL/L — HIGH (ref 20–27)
HCT VFR BLD CALC: 35.8 % — LOW (ref 39–50)
HCT VFR BLD CALC: 36.2 % — LOW (ref 39–50)
HDLC SERPL-MCNC: 37 MG/DL — LOW
HGB BLD-MCNC: 11.6 G/DL — LOW (ref 13–17)
HGB BLD-MCNC: 11.9 G/DL — LOW (ref 13–17)
IMM GRANULOCYTES NFR BLD AUTO: 1 % — SIGNIFICANT CHANGE UP (ref 0–1.5)
IMM GRANULOCYTES NFR BLD AUTO: 1.6 % — HIGH (ref 0–1.5)
INR BLD: 1.19 — HIGH (ref 0.88–1.16)
IRON SATN MFR SERPL: 13 UG/DL — LOW (ref 45–165)
IRON SATN MFR SERPL: 7 % — LOW (ref 16–55)
KETONES UR-MCNC: NEGATIVE — SIGNIFICANT CHANGE UP
LACTATE SERPL-SCNC: 2 MMOL/L — SIGNIFICANT CHANGE UP (ref 0.5–2)
LACTATE SERPL-SCNC: 2.6 MMOL/L — HIGH (ref 0.5–2)
LEGIONELLA AG UR QL: NEGATIVE — SIGNIFICANT CHANGE UP
LEUKOCYTE ESTERASE UR-ACNC: NEGATIVE — SIGNIFICANT CHANGE UP
LIPID PNL WITH DIRECT LDL SERPL: 48 MG/DL — SIGNIFICANT CHANGE UP
LYMPHOCYTES # BLD AUTO: 0.48 K/UL — LOW (ref 1–3.3)
LYMPHOCYTES # BLD AUTO: 0.9 K/UL — LOW (ref 1–3.3)
LYMPHOCYTES # BLD AUTO: 2 % — LOW (ref 13–44)
LYMPHOCYTES # BLD AUTO: 2.1 % — LOW (ref 13–44)
LYMPHOCYTES # BLD AUTO: 4.7 % — LOW (ref 13–44)
MAGNESIUM SERPL-MCNC: 1.7 MG/DL — SIGNIFICANT CHANGE UP (ref 1.6–2.6)
MAGNESIUM SERPL-MCNC: 2.5 MG/DL — SIGNIFICANT CHANGE UP (ref 1.6–2.6)
MANUAL SMEAR VERIFICATION: YES — SIGNIFICANT CHANGE UP
MCHC RBC-ENTMCNC: 29.7 PG — SIGNIFICANT CHANGE UP (ref 27–34)
MCHC RBC-ENTMCNC: 29.8 PG — SIGNIFICANT CHANGE UP (ref 27–34)
MCHC RBC-ENTMCNC: 32 GM/DL — SIGNIFICANT CHANGE UP (ref 32–36)
MCHC RBC-ENTMCNC: 33.2 GM/DL — SIGNIFICANT CHANGE UP (ref 32–36)
MCV RBC AUTO: 89.5 FL — SIGNIFICANT CHANGE UP (ref 80–100)
MCV RBC AUTO: 92.8 FL — SIGNIFICANT CHANGE UP (ref 80–100)
MONOCYTES # BLD AUTO: 1 K/UL — HIGH (ref 0–0.9)
MONOCYTES # BLD AUTO: 1.07 K/UL — HIGH (ref 0–0.9)
MONOCYTES NFR BLD AUTO: 4 % — SIGNIFICANT CHANGE UP (ref 2–14)
MONOCYTES NFR BLD AUTO: 4.4 % — SIGNIFICANT CHANGE UP (ref 2–14)
MONOCYTES NFR BLD AUTO: 5.6 % — SIGNIFICANT CHANGE UP (ref 2–14)
MRSA PCR RESULT.: NEGATIVE — SIGNIFICANT CHANGE UP
NEUTROPHILS # BLD AUTO: 16.6 K/UL — HIGH (ref 1.8–7.4)
NEUTROPHILS # BLD AUTO: 20.82 K/UL — HIGH (ref 1.8–7.4)
NEUTROPHILS NFR BLD AUTO: 87.3 % — HIGH (ref 43–77)
NEUTROPHILS NFR BLD AUTO: 89 % — HIGH (ref 43–77)
NEUTROPHILS NFR BLD AUTO: 92.4 % — HIGH (ref 43–77)
NEUTS BAND # BLD: 5 % — SIGNIFICANT CHANGE UP
NITRITE UR-MCNC: NEGATIVE — SIGNIFICANT CHANGE UP
NON HDL CHOLESTEROL: 69 MG/DL — SIGNIFICANT CHANGE UP
NRBC # BLD: 0 /100 WBCS — SIGNIFICANT CHANGE UP (ref 0–0)
NRBC # BLD: 0 /100 WBCS — SIGNIFICANT CHANGE UP (ref 0–0)
NT-PROBNP SERPL-SCNC: 9234 PG/ML — HIGH (ref 0–300)
PCO2 BLDV: 45 MMHG — SIGNIFICANT CHANGE UP (ref 41–51)
PH BLDV: 7.4 — SIGNIFICANT CHANGE UP (ref 7.32–7.43)
PH UR: 6 — SIGNIFICANT CHANGE UP (ref 5–8)
PHOSPHATE SERPL-MCNC: 1.6 MG/DL — LOW (ref 2.5–4.5)
PLAT MORPH BLD: ABNORMAL
PLATELET # BLD AUTO: 593 K/UL — HIGH (ref 150–400)
PLATELET # BLD AUTO: 707 K/UL — HIGH (ref 150–400)
PLATELET CLUMP BLD QL SMEAR: PRESENT
PO2 BLDV: 18 MMHG — SIGNIFICANT CHANGE UP
POLYCHROMASIA BLD QL SMEAR: SLIGHT — SIGNIFICANT CHANGE UP
POTASSIUM BLDV-SCNC: 3.3 MMOL/L — LOW (ref 3.5–4.9)
POTASSIUM SERPL-MCNC: 3.2 MMOL/L — LOW (ref 3.5–5.3)
POTASSIUM SERPL-MCNC: 3.4 MMOL/L — LOW (ref 3.5–5.3)
POTASSIUM SERPL-MCNC: 4.4 MMOL/L — SIGNIFICANT CHANGE UP (ref 3.5–5.3)
POTASSIUM SERPL-SCNC: 3.2 MMOL/L — LOW (ref 3.5–5.3)
POTASSIUM SERPL-SCNC: 3.4 MMOL/L — LOW (ref 3.5–5.3)
POTASSIUM SERPL-SCNC: 4.4 MMOL/L — SIGNIFICANT CHANGE UP (ref 3.5–5.3)
PROCALCITONIN SERPL-MCNC: 3.36 NG/ML — HIGH (ref 0.02–0.1)
PROT SERPL-MCNC: 6.6 G/DL — SIGNIFICANT CHANGE UP (ref 6–8.3)
PROT SERPL-MCNC: 7 G/DL — SIGNIFICANT CHANGE UP (ref 6–8.3)
PROT SERPL-MCNC: 7 G/DL — SIGNIFICANT CHANGE UP (ref 6–8.3)
PROT UR-MCNC: NEGATIVE MG/DL — SIGNIFICANT CHANGE UP
PROTHROM AB SERPL-ACNC: 14.2 SEC — HIGH (ref 10.6–13.6)
RBC # BLD: 3.9 M/UL — LOW (ref 4.2–5.8)
RBC # BLD: 4 M/UL — LOW (ref 4.2–5.8)
RBC # FLD: 14 % — SIGNIFICANT CHANGE UP (ref 10.3–14.5)
RBC # FLD: 14.2 % — SIGNIFICANT CHANGE UP (ref 10.3–14.5)
RBC BLD AUTO: ABNORMAL
RH IG SCN BLD-IMP: POSITIVE — SIGNIFICANT CHANGE UP
S AUREUS DNA NOSE QL NAA+PROBE: NEGATIVE — SIGNIFICANT CHANGE UP
S PNEUM AG UR QL: NEGATIVE — SIGNIFICANT CHANGE UP
SAO2 % BLDV: 22 % — SIGNIFICANT CHANGE UP
SARS-COV-2 RNA SPEC QL NAA+PROBE: SIGNIFICANT CHANGE UP
SODIUM SERPL-SCNC: 137 MMOL/L — SIGNIFICANT CHANGE UP (ref 135–145)
SP GR SPEC: 1.01 — SIGNIFICANT CHANGE UP (ref 1–1.03)
STOMATOCYTES BLD QL SMEAR: SLIGHT — SIGNIFICANT CHANGE UP
T4 AB SER-ACNC: 5.86 UG/DL — SIGNIFICANT CHANGE UP (ref 3.17–11.72)
TIBC SERPL-MCNC: 196 UG/DL — LOW (ref 220–430)
TRANSFERRIN SERPL-MCNC: 158 MG/DL — LOW (ref 200–360)
TRIGL SERPL-MCNC: 106 MG/DL — SIGNIFICANT CHANGE UP
TROPONIN T SERPL-MCNC: 0.52 NG/ML — CRITICAL HIGH (ref 0–0.01)
TROPONIN T SERPL-MCNC: 0.67 NG/ML — CRITICAL HIGH (ref 0–0.01)
TSH SERPL-MCNC: 0.24 UIU/ML — LOW (ref 0.35–4.94)
UIBC SERPL-MCNC: 183 UG/DL — SIGNIFICANT CHANGE UP (ref 110–370)
UROBILINOGEN FLD QL: 0.2 E.U./DL — SIGNIFICANT CHANGE UP
WBC # BLD: 19.03 K/UL — HIGH (ref 3.8–10.5)
WBC # BLD: 23.16 K/UL — HIGH (ref 3.8–10.5)
WBC # FLD AUTO: 19.03 K/UL — HIGH (ref 3.8–10.5)
WBC # FLD AUTO: 23.16 K/UL — HIGH (ref 3.8–10.5)

## 2021-02-20 PROCEDURE — 99291 CRITICAL CARE FIRST HOUR: CPT

## 2021-02-20 PROCEDURE — 93321 DOPPLER ECHO F-UP/LMTD STD: CPT | Mod: 26,59

## 2021-02-20 PROCEDURE — 93010 ELECTROCARDIOGRAM REPORT: CPT

## 2021-02-20 PROCEDURE — 71045 X-RAY EXAM CHEST 1 VIEW: CPT | Mod: 26

## 2021-02-20 PROCEDURE — 93306 TTE W/DOPPLER COMPLETE: CPT | Mod: 26

## 2021-02-20 PROCEDURE — 93308 TTE F-UP OR LMTD: CPT | Mod: 26,59

## 2021-02-20 RX ORDER — IPRATROPIUM/ALBUTEROL SULFATE 18-103MCG
3 AEROSOL WITH ADAPTER (GRAM) INHALATION
Refills: 0 | Status: DISCONTINUED | OUTPATIENT
Start: 2021-02-20 | End: 2021-02-20

## 2021-02-20 RX ORDER — HEPARIN SODIUM 5000 [USP'U]/ML
10 INJECTION INTRAVENOUS; SUBCUTANEOUS
Qty: 25000 | Refills: 0 | Status: DISCONTINUED | OUTPATIENT
Start: 2021-02-20 | End: 2021-02-20

## 2021-02-20 RX ORDER — HEPARIN SODIUM 5000 [USP'U]/ML
1000 INJECTION INTRAVENOUS; SUBCUTANEOUS
Qty: 25000 | Refills: 0 | Status: DISCONTINUED | OUTPATIENT
Start: 2021-02-20 | End: 2021-02-20

## 2021-02-20 RX ORDER — ASPIRIN/CALCIUM CARB/MAGNESIUM 324 MG
81 TABLET ORAL EVERY 24 HOURS
Refills: 0 | Status: DISCONTINUED | OUTPATIENT
Start: 2021-02-21 | End: 2021-02-27

## 2021-02-20 RX ORDER — HEPARIN SODIUM 5000 [USP'U]/ML
4000 INJECTION INTRAVENOUS; SUBCUTANEOUS ONCE
Refills: 0 | Status: COMPLETED | OUTPATIENT
Start: 2021-02-20 | End: 2021-02-20

## 2021-02-20 RX ORDER — BUMETANIDE 0.25 MG/ML
1 INJECTION INTRAMUSCULAR; INTRAVENOUS
Qty: 20 | Refills: 0 | Status: DISCONTINUED | OUTPATIENT
Start: 2021-02-20 | End: 2021-02-20

## 2021-02-20 RX ORDER — POTASSIUM CHLORIDE 20 MEQ
40 PACKET (EA) ORAL ONCE
Refills: 0 | Status: DISCONTINUED | OUTPATIENT
Start: 2021-02-20 | End: 2021-02-20

## 2021-02-20 RX ORDER — ASPIRIN/CALCIUM CARB/MAGNESIUM 324 MG
81 TABLET ORAL DAILY
Refills: 0 | Status: DISCONTINUED | OUTPATIENT
Start: 2021-02-20 | End: 2021-02-20

## 2021-02-20 RX ORDER — POTASSIUM CHLORIDE 20 MEQ
40 PACKET (EA) ORAL ONCE
Refills: 0 | Status: COMPLETED | OUTPATIENT
Start: 2021-02-20 | End: 2021-02-20

## 2021-02-20 RX ORDER — CEFTRIAXONE 500 MG/1
1000 INJECTION, POWDER, FOR SOLUTION INTRAMUSCULAR; INTRAVENOUS ONCE
Refills: 0 | Status: DISCONTINUED | OUTPATIENT
Start: 2021-02-20 | End: 2021-02-20

## 2021-02-20 RX ORDER — AZITHROMYCIN 500 MG/1
500 TABLET, FILM COATED ORAL ONCE
Refills: 0 | Status: DISCONTINUED | OUTPATIENT
Start: 2021-02-20 | End: 2021-02-20

## 2021-02-20 RX ORDER — POTASSIUM CHLORIDE 20 MEQ
20 PACKET (EA) ORAL ONCE
Refills: 0 | Status: DISCONTINUED | OUTPATIENT
Start: 2021-02-20 | End: 2021-02-20

## 2021-02-20 RX ORDER — CEFTRIAXONE 500 MG/1
1000 INJECTION, POWDER, FOR SOLUTION INTRAMUSCULAR; INTRAVENOUS EVERY 24 HOURS
Refills: 0 | Status: DISCONTINUED | OUTPATIENT
Start: 2021-02-20 | End: 2021-02-20

## 2021-02-20 RX ORDER — ATORVASTATIN CALCIUM 80 MG/1
80 TABLET, FILM COATED ORAL AT BEDTIME
Refills: 0 | Status: DISCONTINUED | OUTPATIENT
Start: 2021-02-20 | End: 2021-02-27

## 2021-02-20 RX ORDER — CHLORHEXIDINE GLUCONATE 213 G/1000ML
1 SOLUTION TOPICAL
Refills: 0 | Status: DISCONTINUED | OUTPATIENT
Start: 2021-02-20 | End: 2021-02-27

## 2021-02-20 RX ORDER — MAGNESIUM SULFATE 500 MG/ML
2 VIAL (ML) INJECTION ONCE
Refills: 0 | Status: COMPLETED | OUTPATIENT
Start: 2021-02-20 | End: 2021-02-20

## 2021-02-20 RX ORDER — ACETAMINOPHEN 500 MG
650 TABLET ORAL EVERY 6 HOURS
Refills: 0 | Status: DISCONTINUED | OUTPATIENT
Start: 2021-02-20 | End: 2021-02-27

## 2021-02-20 RX ORDER — LORATADINE 10 MG/1
1 TABLET ORAL
Qty: 0 | Refills: 0 | DISCHARGE

## 2021-02-20 RX ORDER — ACETAMINOPHEN 500 MG
650 TABLET ORAL EVERY 6 HOURS
Refills: 0 | Status: DISCONTINUED | OUTPATIENT
Start: 2021-02-20 | End: 2021-02-20

## 2021-02-20 RX ORDER — BUMETANIDE 0.25 MG/ML
2 INJECTION INTRAMUSCULAR; INTRAVENOUS ONCE
Refills: 0 | Status: COMPLETED | OUTPATIENT
Start: 2021-02-20 | End: 2021-02-20

## 2021-02-20 RX ORDER — LANOLIN ALCOHOL/MO/W.PET/CERES
5 CREAM (GRAM) TOPICAL AT BEDTIME
Refills: 0 | Status: DISCONTINUED | OUTPATIENT
Start: 2021-02-20 | End: 2021-02-27

## 2021-02-20 RX ORDER — ASPIRIN/CALCIUM CARB/MAGNESIUM 324 MG
325 TABLET ORAL ONCE
Refills: 0 | Status: COMPLETED | OUTPATIENT
Start: 2021-02-20 | End: 2021-02-20

## 2021-02-20 RX ORDER — HEPARIN SODIUM 5000 [USP'U]/ML
1200 INJECTION INTRAVENOUS; SUBCUTANEOUS
Qty: 25000 | Refills: 0 | Status: DISCONTINUED | OUTPATIENT
Start: 2021-02-20 | End: 2021-02-21

## 2021-02-20 RX ORDER — LORATADINE 10 MG/1
10 TABLET ORAL DAILY
Refills: 0 | Status: DISCONTINUED | OUTPATIENT
Start: 2021-02-20 | End: 2021-02-21

## 2021-02-20 RX ORDER — FUROSEMIDE 40 MG
60 TABLET ORAL ONCE
Refills: 0 | Status: COMPLETED | OUTPATIENT
Start: 2021-02-20 | End: 2021-02-20

## 2021-02-20 RX ORDER — HEPARIN SODIUM 5000 [USP'U]/ML
4000 INJECTION INTRAVENOUS; SUBCUTANEOUS EVERY 6 HOURS
Refills: 0 | Status: DISCONTINUED | OUTPATIENT
Start: 2021-02-20 | End: 2021-02-20

## 2021-02-20 RX ORDER — IPRATROPIUM/ALBUTEROL SULFATE 18-103MCG
3 AEROSOL WITH ADAPTER (GRAM) INHALATION EVERY 6 HOURS
Refills: 0 | Status: DISCONTINUED | OUTPATIENT
Start: 2021-02-20 | End: 2021-02-27

## 2021-02-20 RX ORDER — VANCOMYCIN HCL 1 G
1250 VIAL (EA) INTRAVENOUS EVERY 12 HOURS
Refills: 0 | Status: DISCONTINUED | OUTPATIENT
Start: 2021-02-20 | End: 2021-02-20

## 2021-02-20 RX ORDER — MAGNESIUM SULFATE 500 MG/ML
1 VIAL (ML) INJECTION
Refills: 0 | Status: DISCONTINUED | OUTPATIENT
Start: 2021-02-20 | End: 2021-02-20

## 2021-02-20 RX ORDER — AZITHROMYCIN 500 MG/1
500 TABLET, FILM COATED ORAL EVERY 24 HOURS
Refills: 0 | Status: DISCONTINUED | OUTPATIENT
Start: 2021-02-20 | End: 2021-02-20

## 2021-02-20 RX ORDER — CLOPIDOGREL BISULFATE 75 MG/1
600 TABLET, FILM COATED ORAL ONCE
Refills: 0 | Status: COMPLETED | OUTPATIENT
Start: 2021-02-20 | End: 2021-02-20

## 2021-02-20 RX ORDER — ACETAMINOPHEN 500 MG
975 TABLET ORAL ONCE
Refills: 0 | Status: COMPLETED | OUTPATIENT
Start: 2021-02-20 | End: 2021-02-20

## 2021-02-20 RX ORDER — FUROSEMIDE 40 MG
60 TABLET ORAL EVERY 12 HOURS
Refills: 0 | Status: DISCONTINUED | OUTPATIENT
Start: 2021-02-20 | End: 2021-02-20

## 2021-02-20 RX ORDER — CLOPIDOGREL BISULFATE 75 MG/1
75 TABLET, FILM COATED ORAL DAILY
Refills: 0 | Status: DISCONTINUED | OUTPATIENT
Start: 2021-02-20 | End: 2021-02-20

## 2021-02-20 RX ORDER — POTASSIUM PHOSPHATE, MONOBASIC POTASSIUM PHOSPHATE, DIBASIC 236; 224 MG/ML; MG/ML
15 INJECTION, SOLUTION INTRAVENOUS ONCE
Refills: 0 | Status: COMPLETED | OUTPATIENT
Start: 2021-02-20 | End: 2021-02-20

## 2021-02-20 RX ORDER — HEPARIN SODIUM 5000 [USP'U]/ML
INJECTION INTRAVENOUS; SUBCUTANEOUS
Qty: 25000 | Refills: 0 | Status: DISCONTINUED | OUTPATIENT
Start: 2021-02-20 | End: 2021-02-20

## 2021-02-20 RX ORDER — BUMETANIDE 0.25 MG/ML
0.5 INJECTION INTRAMUSCULAR; INTRAVENOUS
Qty: 20 | Refills: 0 | Status: DISCONTINUED | OUTPATIENT
Start: 2021-02-20 | End: 2021-02-21

## 2021-02-20 RX ORDER — CLOPIDOGREL BISULFATE 75 MG/1
75 TABLET, FILM COATED ORAL EVERY 24 HOURS
Refills: 0 | Status: DISCONTINUED | OUTPATIENT
Start: 2021-02-21 | End: 2021-02-21

## 2021-02-20 RX ORDER — PIPERACILLIN AND TAZOBACTAM 4; .5 G/20ML; G/20ML
4.5 INJECTION, POWDER, LYOPHILIZED, FOR SOLUTION INTRAVENOUS EVERY 6 HOURS
Refills: 0 | Status: DISCONTINUED | OUTPATIENT
Start: 2021-02-20 | End: 2021-02-23

## 2021-02-20 RX ADMIN — Medication 325 MILLIGRAM(S): at 03:22

## 2021-02-20 RX ADMIN — BUMETANIDE 5 MG/HR: 0.25 INJECTION INTRAMUSCULAR; INTRAVENOUS at 15:07

## 2021-02-20 RX ADMIN — LORATADINE 10 MILLIGRAM(S): 10 TABLET ORAL at 10:24

## 2021-02-20 RX ADMIN — Medication 50 GRAM(S): at 07:20

## 2021-02-20 RX ADMIN — Medication 125 MILLIGRAM(S): at 03:22

## 2021-02-20 RX ADMIN — AZITHROMYCIN 255 MILLIGRAM(S): 500 TABLET, FILM COATED ORAL at 05:34

## 2021-02-20 RX ADMIN — Medication 40 MILLIEQUIVALENT(S): at 18:01

## 2021-02-20 RX ADMIN — BUMETANIDE 2 MILLIGRAM(S): 0.25 INJECTION INTRAMUSCULAR; INTRAVENOUS at 14:19

## 2021-02-20 RX ADMIN — Medication 650 MILLIGRAM(S): at 22:29

## 2021-02-20 RX ADMIN — Medication 40 MILLIEQUIVALENT(S): at 06:38

## 2021-02-20 RX ADMIN — CLOPIDOGREL BISULFATE 600 MILLIGRAM(S): 75 TABLET, FILM COATED ORAL at 04:49

## 2021-02-20 RX ADMIN — Medication 50 GRAM(S): at 06:37

## 2021-02-20 RX ADMIN — CHLORHEXIDINE GLUCONATE 1 APPLICATION(S): 213 SOLUTION TOPICAL at 07:20

## 2021-02-20 RX ADMIN — POTASSIUM PHOSPHATE, MONOBASIC POTASSIUM PHOSPHATE, DIBASIC 62.5 MILLIMOLE(S): 236; 224 INJECTION, SOLUTION INTRAVENOUS at 18:03

## 2021-02-20 RX ADMIN — PIPERACILLIN AND TAZOBACTAM 200 GRAM(S): 4; .5 INJECTION, POWDER, LYOPHILIZED, FOR SOLUTION INTRAVENOUS at 21:44

## 2021-02-20 RX ADMIN — HEPARIN SODIUM 800 UNIT(S)/HR: 5000 INJECTION INTRAVENOUS; SUBCUTANEOUS at 05:19

## 2021-02-20 RX ADMIN — CEFTRIAXONE 100 MILLIGRAM(S): 500 INJECTION, POWDER, FOR SOLUTION INTRAMUSCULAR; INTRAVENOUS at 06:37

## 2021-02-20 RX ADMIN — HEPARIN SODIUM 10 UNIT(S)/HR: 5000 INJECTION INTRAVENOUS; SUBCUTANEOUS at 12:36

## 2021-02-20 RX ADMIN — Medication 166.67 MILLIGRAM(S): at 17:18

## 2021-02-20 RX ADMIN — Medication 40 MILLIEQUIVALENT(S): at 04:49

## 2021-02-20 RX ADMIN — Medication 3 MILLILITER(S): at 03:22

## 2021-02-20 RX ADMIN — Medication 60 MILLIGRAM(S): at 10:24

## 2021-02-20 RX ADMIN — Medication 975 MILLIGRAM(S): at 16:20

## 2021-02-20 RX ADMIN — PIPERACILLIN AND TAZOBACTAM 200 GRAM(S): 4; .5 INJECTION, POWDER, LYOPHILIZED, FOR SOLUTION INTRAVENOUS at 16:30

## 2021-02-20 RX ADMIN — HEPARIN SODIUM 12 UNIT(S)/HR: 5000 INJECTION INTRAVENOUS; SUBCUTANEOUS at 19:31

## 2021-02-20 RX ADMIN — ATORVASTATIN CALCIUM 80 MILLIGRAM(S): 80 TABLET, FILM COATED ORAL at 21:44

## 2021-02-20 RX ADMIN — Medication 50 GRAM(S): at 18:53

## 2021-02-20 RX ADMIN — HEPARIN SODIUM 4000 UNIT(S): 5000 INJECTION INTRAVENOUS; SUBCUTANEOUS at 04:56

## 2021-02-20 RX ADMIN — Medication 60 MILLIGRAM(S): at 04:49

## 2021-02-20 NOTE — ED PROVIDER NOTE - OBJECTIVE STATEMENT
60M c/o SOB.  pt states last week he was having diffuse chest pain. states was coughing.  spoke with his friend who called him in a prescription for a zpak.  pt states cough and SOB worsening so he was seen at  and had CXR. was told he likely had pneumonia and was prescribed levaquin a few days ago.  no fever today. some blood tinged phlegm.  upon EMS arrival pt hypoxic to 87%, given oxygen.  pt states tested negative for covid at .  pt states no chest pain today, just short of breath.

## 2021-02-20 NOTE — PROVIDER CONTACT NOTE (OTHER) - SITUATION
Patient's temperature 103.5 per rectal. Dr. Gilliam made aware. Tylenol 650 mg po given as PRN orders.

## 2021-02-20 NOTE — PROVIDER CONTACT NOTE (CHANGE IN STATUS NOTIFICATION) - BACKGROUND
Pt came in for shortness of breath , diagnosed with MI on anteroseptal wall. Pt given 60mg lasix  at 10am and only had a output of 680ml after four hour.

## 2021-02-20 NOTE — PROGRESS NOTE ADULT - ASSESSMENT
Patient is a 59yo M PMH chronic low back pain with sciatica, arthritis, psoriasis presenting with 1.5 weeks of shortness of breath and chest pain. Found to be hypoxic and have significant pulmonary edema with new onset HF (bedside echo EF40%), b/l lung opacities with concern for PNA (pt meeting 3/4 SIRS criteria) and late presenting STEMI (trops 0.67, EKG with septal q waves and ST elevations). In the ED, pt was Aspirin/Plavix loaded and started on a Heparin gtt. Patient will be admitted to the CCU for further management.     Neuro  AAOx3     Cardiovascular   #ACS   Presenting with diffuse chest pain. Trops 0.67 (repeat 0.52). EKG with septal q waves and ST elevation c/f late presenting STEMI. No known CAD. S/p Aspirin 325mg, Plavix 600mg, Heparin bolus. TTE  this admission w/ Severely reduced left ventricular systolic function and regional wall motion abnormalities consistent with ischemic heart disease. LV EF 24%.  -c/w Aspirin 81mg  -c/w Plavix 75mg   -s/p hep bolus. 1st ptt subtherapeutic hep increased from 8-10 (goal 53-73)  -c/w Atorvastatin 80mg  -Plan for cath Monday once medically optimized    #New onset CHF   Presenting with SOB. Pt found to be hypoxic and CXR showing fluid overload. BNP 9234. S/p Lasix 60mg in ED. Likely in setting of ACS. Pt not responding appropriately to 60 IV Lasix x 2 requiring 5mg metolazone, 2mg Bumex STAT then 1mg/hr Bumex infusion.   -Monitor UOP, strict Is and Os  -q6 BMP, replete lytes appropriately         Pulmonary   #Acute hypoxic respiratory failure   On admission hypoxic to 87%. Improvement to 93% on 3L. Likely 2/2 PNA and fluid overloaded in setting of ACS w/ c/f for new onset HF. S/p Solumedrol 125mg, Duoneb x 1.   -Supplemental oxygen prn. Pt w. inc O2 NC increased from 3 to 6L sating in low 90's, w/ increased WOB requiring Bumex drip.  -S/p Solumedrol 125mg   PLAN  -Duonebs q6 prn  -Management for PNA below    -c/w Bumex drip @ 1mg/hr  -BIPAP at bedside.  -f/u q6 BMP, replete lytes      #CAP   Management as below     ID  #Sepsis 2/2 CAP   Pt meeting 3/4 SIRS criteria (tachycardic, tachypneic, leukocytosis). Was started on CAP tx at urgent care. CXR showing pulmonary edema with suspected b/l opacities. Procalcitonin 3.36. s/p Azithromycin 500mg and Ceftriaxone 1g in ED. Pt initially dc'd abx due to low suspicion for infection w/ SIRS + leukocytosis presumed to be 2/2 to CHF exacerbation.  -Pt spiked new fever of 103.6 rectal, tachycardic to 131 and tachypneic to 26. Pt restarted on vanc + zosyn. w/ source still likely to be PNA  -pt w/ increasing white count   -UA negative, Urine strep and leg negative    PLAN  -F/u Bcxs, UA, Ucx (4am 2/20)  -f/u Bcx (4:30pm 2/20)  -f/u MRSA swab  -f/u Lactate        GI  NABILA     Renal   NABILA     Endo  #Hyperglycemia   Glucose 124 on admission. No known h/o DM or Pre-DM   -HgbA1c wnl  PLAN   -c/w ISS     Heme   #Anemia   Found to have an Hgb of 11.6. Per HIE labs, baseline Hgb is 15.   -iron studies c/w anemia of chronic disease  PLAN  -in setting of sepsis will hold iron tx for now   -Maintain active T&S    #Thrombocytosis  Plts 593, likely reactive in setting of ACS and PNA although per HIE labs Plts 412 in 1/2020.   -Monitor CBC     Derm   #Psoriasis   Not on any medications     MSK   #Arthritis   -Tylenol 650mg prn     Allergy  #Seasonal allergies   -C/w home Claritin     F: none  E: replete K <4, Mg <2  N: NPO   GI Ppx: None   DVT Ppx: Hep gtt  Code status: FULL   Dispo: CCU    Patient is a 61yo M PMH chronic low back pain with sciatica, arthritis, psoriasis presenting with 1.5 weeks of shortness of breath and chest pain. Found to be hypoxic and have significant pulmonary edema with new onset HF (bedside echo EF40%), b/l lung opacities with concern for PNA (pt meeting 3/4 SIRS criteria) and late presenting STEMI (trops 0.67, EKG with septal q waves and ST elevations). In the ED, pt was Aspirin/Plavix loaded and started on a Heparin gtt. Patient will be admitted to the CCU for further management.     Neuro  AAOx3     Cardiovascular   #ACS   Presenting with diffuse chest pain. Trops 0.67 (repeat 0.52). EKG with septal q waves and ST elevation c/f late presenting STEMI. No known CAD. S/p Aspirin 325mg, Plavix 600mg, Heparin bolus. TTE  this admission w/ Severely reduced left ventricular systolic function and regional wall motion abnormalities consistent with ischemic heart disease. LV EF 24%.  -s/p hep bolus. ptt subtherapeutic hep increased from 8-10 (goal 53-73)  PLAN  -c/w Aspirin 81mg  -c/w Plavix 75mg   -c/w Atorvastatin 80mg  -f/u q6 PTT (goal 53-73)  -Plan for cath Monday once medically optimized    #New onset CHF   Presenting with SOB. Pt found to be hypoxic and CXR showing fluid overload. BNP 9234. S/p Lasix 60mg in ED. Likely in setting of ACS. Pt not responding appropriately to 60 IV Lasix x 2 requiring 5mg metolazone, 2mg Bumex STAT then 1mg/hr Bumex infusion.   -Monitor UOP, strict Is and Os  -q6 BMP, replete lytes appropriately         Pulmonary   #Acute hypoxic respiratory failure   On admission hypoxic to 87%. Improvement to 93% on 3L. Likely 2/2 PNA and fluid overloaded in setting of ACS w/ c/f for new onset HF. S/p Solumedrol 125mg, Duoneb x 1.   -Supplemental oxygen prn. Pt w. inc O2 NC increased from 3 to 6L sating in low 90's, w/ increased WOB requiring Bumex drip.  -S/p Solumedrol 125mg   PLAN  -Duonebs q6 prn  -Management for PNA below    -c/w Bumex drip @ 1mg/hr  -BIPAP at bedside.  -f/u q6 BMP, replete lytes      #CAP   Management as below     ID  #Sepsis 2/2 CAP   Pt meeting 3/4 SIRS criteria (tachycardic, tachypneic, leukocytosis). Was started on CAP tx at urgent care. CXR showing pulmonary edema with suspected b/l opacities. Procalcitonin 3.36. s/p Azithromycin 500mg and Ceftriaxone 1g in ED. Pt initially dc'd abx due to low suspicion for infection w/ SIRS + leukocytosis presumed to be 2/2 to CHF exacerbation.  -Pt spiked new fever of 103.6 rectal, tachycardic to 131 and tachypneic to 26. Pt restarted on vanc + zosyn. w/ source still likely to be PNA  -pt w/ increasing white count   -UA negative, Urine strep and leg negative    PLAN  -F/u Bcx's, UA, Ucx (4am 2/20)  -f/u Bcx's (4:30pm 2/20)  -f/u MRSA swab  -f/u Lactate        GI  NABILA     Renal   NABILA     Endo  #Hyperglycemia   Glucose 124 on admission. No known h/o DM or Pre-DM   -HgbA1c wnl  PLAN   -c/w ISS     Heme   #Anemia   Found to have an Hgb of 11.6. Per HIE labs, baseline Hgb is 15.   -iron studies c/w anemia of chronic disease  PLAN  -in setting of sepsis will hold iron tx for now   -Maintain active T&S    #Thrombocytosis  -Plts 593, likely reactive in setting of ACS and PNA although per HIE labs Plts 412 in 1/2020.   -Monitor CBC     Derm   #Psoriasis   Not on any medications     MSK   #Arthritis   -Tylenol 650mg prn     Allergy  #Seasonal allergies   -C/w home Claritin     F: none  E: replete K <4, Mg <2  N: DASH/TLC, fluid restriction    GI Ppx: None   DVT Ppx: Hep gtt  Code status: FULL   Dispo: CCU    Patient is a 59yo M PMH chronic low back pain with sciatica, arthritis, psoriasis presenting with 1.5 weeks of shortness of breath and chest pain. Found to be hypoxic and have significant pulmonary edema with new onset HF (bedside echo EF40%), b/l lung opacities with concern for PNA (pt meeting 3/4 SIRS criteria) and late presenting STEMI (trops 0.67, EKG with septal q waves and ST elevations). In the ED, pt was Aspirin/Plavix loaded and started on a Heparin gtt. Patient will be admitted to the CCU for further management.     Neuro  AAOx3     Cardiovascular   #ACS   Presenting with diffuse chest pain. Trops 0.67 (repeat 0.52). EKG with septal q waves and ST elevation c/f late presenting STEMI. No known CAD. S/p Aspirin 325mg, Plavix 600mg, Heparin bolus. TTE  this admission w/ Severely reduced left ventricular systolic function and regional wall motion abnormalities consistent with ischemic heart disease. LV EF 24%.  -s/p hep bolus. ptt subtherapeutic hep increased from 8-10 (goal 53-73)  PLAN  -c/w Aspirin 81mg  -c/w Plavix 75mg   -c/w Atorvastatin 80mg  -f/u q6 PTT (goal 53-73)  -Plan for cath Monday once medically optimized    #New onset CHF   Presenting with SOB. Pt found to be hypoxic and CXR showing fluid overload. BNP 9234. S/p Lasix 60mg in ED. Likely in setting of ACS. Pt not responding appropriately to 60 IV Lasix x 2 requiring 5mg metolazone, 2mg Bumex STAT then 1mg/hr Bumex infusion.   PLAN  -c/w Bumex drip @ 1mg/hr  -BIPAP at bedside.  -f/u q6 BMP, replete lytes        Pulmonary   #Acute hypoxic respiratory failure   On admission hypoxic to 87%. Improvement to 93% on 3L. Likely 2/2 PNA and fluid overloaded in setting of ACS w/ c/f for new onset HF. S/p Solumedrol 125mg, Duoneb x 1.   -Supplemental oxygen prn. Pt w. inc O2 NC increased from 3 to 6L sating in low 90's, w/ increased WOB requiring Bumex drip.  -S/p Solumedrol 125mg   PLAN  -Duonebs q6 prn  -Management for PNA below    -c/w Bumex drip @ 1mg/hr  -BIPAP at bedside.  -f/u q6 BMP, replete lytes      #CAP   Management as below     ID  #Sepsis 2/2 CAP   Pt meeting 3/4 SIRS criteria (tachycardic, tachypneic, leukocytosis). Was started on CAP tx at urgent care. CXR showing pulmonary edema with suspected b/l opacities. Procalcitonin 3.36. s/p Azithromycin 500mg and Ceftriaxone 1g in ED. Pt initially dc'd abx due to low suspicion for infection w/ SIRS + leukocytosis presumed to be 2/2 to CHF exacerbation.  -Pt spiked new fever of 103.6 rectal, tachycardic to 131 and tachypneic to 26. Pt restarted on vanc + zosyn. w/ source still likely to be PNA  -pt w/ increasing white count   -UA negative, Urine strep and legi negative    PLAN  -F/u Bcx's, UA, Ucx (4am 2/20)  -f/u Bcx's (4:30pm after 103.6 fever-2/20)  -f/u MRSA swab  -f/u Lactate        GI  NABILA     Renal   NABILA     Endo  #Hyperglycemia   Glucose 124 on admission. No known h/o DM or Pre-DM   -HgbA1c wnl  PLAN   -c/w ISS     Heme   #Anemia   Found to have an Hgb of 11.6. Per HIE labs, baseline Hgb is 15.   -iron studies c/w anemia of chronic disease  PLAN  -in setting of sepsis will hold iron tx for now   -Maintain active T&S    #Thrombocytosis  -Plts 593, likely reactive in setting of ACS and PNA although per HIE labs Plts 412 in 1/2020.   PLAN  -Monitor CBC     Derm   #Psoriasis   Not on any medications     MSK   #Arthritis   -Tylenol 650mg prn     Allergy  #Seasonal allergies   -C/w home Claritin     F: none  E: replete K <4, Mg <2  N: DASH/TLC, fluid restriction    GI Ppx: None   DVT Ppx: Hep gtt  Code status: FULL   Dispo: CCU

## 2021-02-20 NOTE — PROVIDER CONTACT NOTE (CHANGE IN STATUS NOTIFICATION) - ACTION/TREATMENT ORDERED:
Spoke with [facility] team. Ordered IV push Bumex , metalazone & Bumex drip. Will continue to monitor.

## 2021-02-20 NOTE — H&P ADULT - HISTORY OF PRESENT ILLNESS
Patient is a 61yo M PMH chronic low back pain, psoriasis presenting with 1 week of shortness of breath and chest pain. Pt went to urgent care for his symptoms. He was found to be covid negative but was started on Abx for PNA.     ED Course   VS: 97.1, 121, 128/73, 24, 87% RA->93% 3L   Labs: wbc 19, hgb 11.6, hct 36.2, plts 593, D-dimer 544, K+ 3.2, Glu 124, CRP 28, Ferritin 701, Procal 3.36, Trop 0.67BNP 9234  Imaging: CXR- pulm edema, possible b/l opacities. EKG-   Received: Aspirin 325mg, Plavix 600mg, Lasix 60mg, Solumedrol 125mg, KCL 40meq, Duoneb x1, Heparin bolus and started on Heparin gtt        Patient is a 59yo M PMH chronic low back pain, psoriasis presenting with 1 week of shortness of breath and chest pain. Pt spoke with a friend regarding his symptoms who prescribed him a Z-Tuan. His symptoms continued to worsen so he went to Urgent Care a few days ago where a CXR was performed. He was tested for COVID which was negative and told he had PNA. He was given a prescription for Levaquin. EMS was called today as pt continued to feel SOB.     ED Course   VS: 97.1, 121, 128/73, 24, 87% RA->93% 3L   Labs: wbc 19, hgb 11.6, hct 36.2, plts 593, D-dimer 544, K+ 3.2, Glu 124, CRP 28, Ferritin 701, Procal 3.36, Trop 0.67BNP 9234  Imaging: CXR- pulm edema, possible b/l opacities. EKG-   Received: Aspirin 325mg, Plavix 600mg, Lasix 60mg, Solumedrol 125mg, KCL 40meq, Duoneb x1, Heparin bolus and started on Heparin gtt        Patient is a 59yo M PMH chronic low back pain with sciatica, arthritis, psoriasis presenting with 1.5 weeks of shortness of breath and chest pain. Pt reports the chest pain began first. It was diffuse, located at the right and left sides of his chest. Described the pain as a muscle spasm. Denies h/o chest pain or cardiac disease. Pain was all the time not worse with exercise or relieved by rest. Pt spoke with a dentist friend of his regarding his symptoms and he prescribed him a Z-Tuan. His symptoms continued to worsen so he went to Urgent Care this past Wednesday where a CXR was performed. He was tested for COVID which was negative and told he had PNA. He was given a prescription for Levaquin. Pt called EMS today as he continued to feel SOB. Patient reports he no longer has chest pain, only SOB. SOB is worse with laying flat and at night. Reports subjective fevers and cough productive of blood streaked sputum. Denies chills, HA, nausea, vomiting, abdominal pain, diarrhea, constipation, dysuria.        ED Course   VS: 97.1, 121, 128/73, 24, 87% RA->93% 3L   Labs: wbc 19, hgb 11.6, hct 36.2, plts 593, D-dimer 544, K+ 3.2, Mg 1.4, Glu 124, CRP 28, Ferritin 701, Procal 3.36, Trop 0.67,BNP 9234  Imaging: CXR- pulm edema, possible b/l opacities. EKG- septal Q waves and ST elevations in V2-V5  Received: Aspirin 325mg, Plavix 600mg, Lasix 60mg, Solumedrol 125mg, KCL 40meq, Duoneb x1, Heparin bolus and started on Heparin gtt

## 2021-02-20 NOTE — PROGRESS NOTE ADULT - SUBJECTIVE AND OBJECTIVE BOX
OVERNIGHT EVENTS: Admitted for ACS (NSTEMI vs late onset STEMI, trops 0.67) and PNA. C/f new onset HF. In ED, Aspirin/Plavix loaded, started on Hep gtt, given Lasix 60mg and Solumedrol 125mg. Started on Ceftriaxone, Azithromycin.      SUBJECTIVE / INTERVAL HPI: Patient seen and examined at bedside. Pt complaining of SOB. no CP. paroxysmal orthopnea. productive cough w/ dark sputum. denies HA, dizziness, GI or  symptoms. denies LE edema. denies fever or chills at this time although endorsing subjective fever and diaphoresis prior to presentation.      PHYSICAL EXAM:    General: WDWN  HEENT: NC/AT; PERRL, anicteric sclera; MMM  Neck: supple, no JVD  Cardiovascular: +S1/S2, tachycardic, regular rhythm   Respiratory: diffuse crackles b/l   Gastrointestinal: soft, NT/ND; +BSx4  Extremities: WWP; no edema, clubbing or cyanosis  Vascular: 2+ radial, DP/PT pulses B/L  Neurological: AAOx3; no focal deficits  Psychiatric: pleasant mood and affect  Dermatologic: no appreciable wounds or damage to the skin    VITAL SIGNS:  Vital Signs Last 24 Hrs  T(C): 39.8 (2021 16:00), Max: 39.8 (2021 16:00)  T(F): 103.6 (2021 16:00), Max: 103.6 (2021 16:00)  HR: 131 (2021 16:00) (91 - 131)  BP: 121/76 (2021 16:00) (98/65 - 128/73)  BP(mean): 92 (2021 16:00) (77 - 92)  RR: 26 (2021 16:00) (22 - 36)  SpO2: 90% (2021 16:00) (87% - 98%)      MEDICATIONS:  MEDICATIONS  (STANDING):  atorvastatin 80 milliGRAM(s) Oral at bedtime  buMETAnide Infusion 1 mG/Hr (5 mL/Hr) IV Continuous <Continuous>  chlorhexidine 2% Cloths 1 Application(s) Topical <User Schedule>  heparin  Infusion 1000 Unit(s)/Hr (10 mL/Hr) IV Continuous <Continuous>  loratadine 10 milliGRAM(s) Oral daily  magnesium sulfate  IVPB 1 Gram(s) IV Intermittent every 1 hour  piperacillin/tazobactam IVPB.. 4.5 Gram(s) IV Intermittent every 6 hours  vancomycin  IVPB 1250 milliGRAM(s) IV Intermittent every 12 hours    MEDICATIONS  (PRN):  acetaminophen   Tablet .. 650 milliGRAM(s) Oral every 6 hours PRN Temp greater or equal to 38C (100.4F), Mild Pain (1 - 3), Moderate Pain (4 - 6)  albuterol/ipratropium for Nebulization 3 milliLiter(s) Nebulizer every 6 hours PRN Shortness of Breath and/or Wheezing      ALLERGIES:  Allergies    No Known Allergies    Intolerances        LABS:                        11.9   23.16 )-----------( 707      ( 2021 16:46 )             35.8     02-20    137  |  95<L>  |  10  ----------------------------<  157<H>  3.4<L>   |  27  |  0.64    Ca    8.8      2021 16:46  Phos  1.6     02-20  Mg     1.7     02-20    TPro  7.0  /  Alb  3.2<L>  /  TBili  0.4  /  DBili  x   /  AST  23  /  ALT  23  /  AlkPhos  147<H>  02-20    PT/INR - ( 2021 03:31 )   PT: 14.2 sec;   INR: 1.19          PTT - ( 2021 10:08 )  PTT:35.7 sec  Urinalysis Basic - ( 2021 16:39 )    Color: Yellow / Appearance: Clear / S.010 / pH: x  Gluc: x / Ketone: NEGATIVE  / Bili: Negative / Urobili: 0.2 E.U./dL   Blood: x / Protein: NEGATIVE mg/dL / Nitrite: NEGATIVE   Leuk Esterase: NEGATIVE / RBC: x / WBC x   Sq Epi: x / Non Sq Epi: x / Bacteria: x      CAPILLARY BLOOD GLUCOSE          RADIOLOGY & ADDITIONAL TESTS: Reviewed.

## 2021-02-20 NOTE — H&P ADULT - ASSESSMENT
Patient is a 59yo M PMH chronic low back pain, psoriasis presenting for shortness of breath and chest pain. Found to be hypoxic and have significant pulmonary edema with c/f new onset HF, b/l lung opacities with concern for PNA (pt meeting 3/4 SIRS criteria) and NSTEMI vs late presenting STEMI (trops 0.67). Patient will be admitted to the CCU for further management.     Neuro  AAOx3     Cardiovascular   #ACS   Presenting with chest pain. Trops 0.67. EKG with septal q waves and ST elevation concerning for an NSTEMI vs late presenting STEMI. No known CAD. S/p Aspirin 325mg, Plavix 600mg, Heparin bolus.   -Start Aspirin 81mg  -Start Plavix 75mg   -Start Hep gtt, monitor PTT (goal 53-73)  -Plan for cath in the AM   -Echo ordered     #R/o new onset CHF   Presenting with SOB. Pt found to be hypoxic and CXR showing fluid overload. S/p Lasix 60mg in ED. Likely in setting of ACS   -Monitor UOP, strict Is and Os  -Echo ordered     Pulmonary   #Acute hypoxic respiratory failure   On admission hypoxic to 87%. Improvement to 93% on 3L. Likely 2/2 PNA and fluid overloaded in setting of ACS w/ c/f for new onset HF   -Supplemental oxygen prn  -Management for PNA below   -Echo ordered   -S/p Solumedrol 125mg and Lasix 60mg in ED     #CAP   Management as below     ID  #Sepsis 2/2 CAP   Pt meeting 3/4 SIRS criteria (tachycardic, tachypneic, leukocytosis). Was started on CAP tx at urgent. CXR showing pulmonary edema with suspected b/l opacities.   -Start Azithromycin 500mg qd x 3 days, Ceftriaxone 1g qd x 5 days   -F/u Bcxs, UA, Ucx     GI  NABILA     Renal   NABILA     Endo  #Hyperglycemia   Glucose 124 on admission. No known h/o DM or Pre-DM   -F/u HgbA1c      Heme   #Anemia   Found to have an Hgb of 11.6. Per HIE labs, baseline Hgb is 15.   -F/u iron studies   -Maintain active T&S     #Thrombocytosis  Plts 593, likely reactive in setting of ACS and PNA although per HIE labs Plts 412 in 1/2020.   -Monitor CBC     Derm   #Psoriasis    Patient is a 59yo M PMH chronic low back pain, psoriasis presenting for shortness of breath and chest pain. Found to be hypoxic and have significant pulmonary edema with c/f new onset HF, b/l lung opacities with concern for PNA (pt meeting 3/4 SIRS criteria) and NSTEMI vs late presenting STEMI (trops 0.67m EKG with septal q waves and ST elevation). In the ED pt was Aspirin/Plavix loaded and started on a Heparin gtt. Patient will be admitted to the CCU for further management.     Neuro  AAOx3     Cardiovascular   #ACS   Presenting with diffuse chest pain. Trops 0.67. EKG with septal q waves and ST elevation concerning for an NSTEMI vs late presenting STEMI. No known CAD. S/p Aspirin 325mg, Plavix 600mg, Heparin bolus.   -Start Aspirin 81mg  -Start Plavix 75mg   -Start Hep gtt, monitor PTT (goal 53-73)  -Plan for cath in the AM   -Echo ordered     #R/o new onset CHF   Presenting with SOB. Pt found to be hypoxic and CXR showing fluid overload. S/p Lasix 60mg in ED. Likely in setting of ACS   -Monitor UOP, strict Is and Os  -Echo ordered     Pulmonary   #Acute hypoxic respiratory failure   On admission hypoxic to 87%. Improvement to 93% on 3L. Likely 2/2 PNA and fluid overloaded in setting of ACS w/ c/f for new onset HF   -Supplemental oxygen prn  -Management for PNA below   -Echo ordered   -S/p Solumedrol 125mg and Lasix 60mg in ED     #CAP   Management as below     ID  #Sepsis 2/2 CAP   Pt meeting 3/4 SIRS criteria (tachycardic, tachypneic, leukocytosis). Was started on CAP tx at urgent. CXR showing pulmonary edema with suspected b/l opacities.   -Start Azithromycin 500mg qd x 3 days, Ceftriaxone 1g qd x 5 days   -F/u Bcxs, UA, Ucx     GI  NABILA     Renal   NABILA     Endo  #Hyperglycemia   Glucose 124 on admission. No known h/o DM or Pre-DM   -F/u HgbA1c      Heme   #Anemia   Found to have an Hgb of 11.6. Per HIE labs, baseline Hgb is 15.   -F/u iron studies   -Maintain active T&S     #Thrombocytosis  Plts 593, likely reactive in setting of ACS and PNA although per HIE labs Plts 412 in 1/2020.   -Monitor CBC     Derm   #Psoriasis       F: none  E: replete K <4, Mg <2  N: NPO   GI Ppx: None   DVT Ppx: Hep gtt  Code status: FULL   Dispo: CCU    Patient is a 61yo M PMH chronic low back pain, psoriasis presenting for shortness of breath and chest pain. Found to be hypoxic and have significant pulmonary edema with c/f new onset HF, b/l lung opacities with concern for PNA (pt meeting 3/4 SIRS criteria) and NSTEMI vs late presenting STEMI (trops 0.67m EKG with septal q waves and ST elevation). In the ED pt was Aspirin/Plavix loaded and started on a Heparin gtt. Patient will be admitted to the CCU for further management.     Neuro  AAOx3     Cardiovascular   #ACS   Presenting with diffuse chest pain. Trops 0.67. EKG with septal q waves and ST elevation concerning for an NSTEMI vs late presenting STEMI. No known CAD. S/p Aspirin 325mg, Plavix 600mg, Heparin bolus.   -Start Aspirin 81mg  -Start Plavix 75mg   -Start Hep gtt, monitor PTT (goal 53-73)  -Plan for cath in the AM   -Echo ordered   -Trend cardiac enzymes to peak     #R/o new onset CHF   Presenting with SOB. Pt found to be hypoxic and CXR showing fluid overload. BNP 9234. S/p Lasix 60mg in ED. Likely in setting of ACS   -Monitor UOP, strict Is and Os  -Echo ordered     Pulmonary   #Acute hypoxic respiratory failure   On admission hypoxic to 87%. Improvement to 93% on 3L. Likely 2/2 PNA and fluid overloaded in setting of ACS w/ c/f for new onset HF. S/p Solumedrol 125mg, Duoneb x 1.   -Supplemental oxygen prn  -Duonebs q6 prn  -Management for PNA below   -Echo ordered   -S/p Solumedrol 125mg and Lasix 60mg in ED     #CAP   Management as below     ID  #Sepsis 2/2 CAP   Pt meeting 3/4 SIRS criteria (tachycardic, tachypneic, leukocytosis). Was started on CAP tx at urgent. CXR showing pulmonary edema with suspected b/l opacities. Procalcitonin 3.36  -Start Azithromycin 500mg qd x 3 days, Ceftriaxone 1g qd x 5 days   -F/u Bcxs, UA, Ucx     GI  NABILA     Renal   NABILA     Endo  #Hyperglycemia   Glucose 124 on admission. No known h/o DM or Pre-DM   -F/u HgbA1c      Heme   #Anemia   Found to have an Hgb of 11.6. Per HIE labs, baseline Hgb is 15.   -F/u iron studies   -Maintain active T&S     #Thrombocytosis  Plts 593, likely reactive in setting of ACS and PNA although per HIE labs Plts 412 in 1/2020.   -Monitor CBC     Derm   #Psoriasis       F: none  E: replete K <4, Mg <2  N: NPO   GI Ppx: None   DVT Ppx: Hep gtt  Code status: FULL   Dispo: CCU    Patient is a 59yo M PMH chronic low back pain, psoriasis presenting for shortness of breath and chest pain. Found to be hypoxic and have significant pulmonary edema with c/f new onset HF, b/l lung opacities with concern for PNA (pt meeting 3/4 SIRS criteria) and late presenting STEMI (trops 0.67m EKG with septal q waves and ST elevation). In the ED pt was Aspirin/Plavix loaded and started on a Heparin gtt. Patient will be admitted to the CCU for further management.     Neuro  AAOx3     Cardiovascular   #ACS   Presenting with diffuse chest pain. Trops 0.67. EKG with septal q waves and ST elevation c/f late presenting STEMI. No known CAD. S/p Aspirin 325mg, Plavix 600mg, Heparin bolus.   -Start Aspirin 81mg  -Start Plavix 75mg   -Start Hep gtt, monitor PTT (goal 53-73)  -Start Atorvastatin 80mg  -Plan for cath in the AM   -Echo ordered   -Trend cardiac enzymes to peak     #New onset CHF   Presenting with SOB. Pt found to be hypoxic and CXR showing fluid overload. BNP 9234. S/p Lasix 60mg in ED. Likely in setting of ACS. Bedside echo with EF 40%  -Monitor UOP, strict Is and Os  -Formal echo ordered     Pulmonary   #Acute hypoxic respiratory failure   On admission hypoxic to 87%. Improvement to 93% on 3L. Likely 2/2 PNA and fluid overloaded in setting of ACS w/ c/f for new onset HF. S/p Solumedrol 125mg, Duoneb x 1.   -Supplemental oxygen prn  -Duonebs q6 prn  -Management for PNA below   -Echo ordered   -S/p Solumedrol 125mg and Lasix 60mg in ED     #CAP   Management as below     ID  #Sepsis 2/2 CAP   Pt meeting 3/4 SIRS criteria (tachycardic, tachypneic, leukocytosis). Was started on CAP tx at urgent. CXR showing pulmonary edema with suspected b/l opacities. Procalcitonin 3.36  -Start Azithromycin 500mg qd x 3 days, Ceftriaxone 1g qd x 5 days   -F/u Bcxs, UA, Ucx     GI  NABILA     Renal   NABILA     Endo  #Hyperglycemia   Glucose 124 on admission. No known h/o DM or Pre-DM   -F/u HgbA1c      Heme   #Anemia   Found to have an Hgb of 11.6. Per [facility] labs, baseline Hgb is 15.   -F/u iron studies   -Maintain active T&S     #Thrombocytosis  Plts 593, likely reactive in setting of ACS and PNA although per HIE labs Plts 412 in 1/2020.   -Monitor CBC     Derm   #Psoriasis   Not on any medications     F: none  E: replete K <4, Mg <2  N: NPO   GI Ppx: None   DVT Ppx: Hep gtt  Code status: FULL   Dispo: CCU    Patient is a 61yo M PMH chronic low back pain, psoriasis presenting for shortness of breath and chest pain. Found to be hypoxic and have significant pulmonary edema with new onset HF (bedside echo EF40%), b/l lung opacities with concern for PNA (pt meeting 3/4 SIRS criteria) and late presenting STEMI (trops 0.67, EKG with septal q waves and ST elevations). In the ED, pt was Aspirin/Plavix loaded and started on a Heparin gtt. Patient will be admitted to the CCU for further management.     Neuro  AAOx3     Cardiovascular   #ACS   Presenting with diffuse chest pain. Trops 0.67. EKG with septal q waves and ST elevation c/f late presenting STEMI. No known CAD. S/p Aspirin 325mg, Plavix 600mg, Heparin bolus.   -Start Aspirin 81mg  -Start Plavix 75mg   -Start Hep gtt, monitor PTT (goal 53-73)  -Start Atorvastatin 80mg  -Plan for cath in the AM   -Echo ordered   -Trend cardiac enzymes to peak     #New onset CHF   Presenting with SOB. Pt found to be hypoxic and CXR showing fluid overload. BNP 9234. S/p Lasix 60mg in ED. Likely in setting of ACS. Bedside echo with EF 40%  -Monitor UOP, strict Is and Os  -Lasix prn   -Formal echo ordered     Pulmonary   #Acute hypoxic respiratory failure   On admission hypoxic to 87%. Improvement to 93% on 3L. Likely 2/2 PNA and fluid overloaded in setting of ACS w/ c/f for new onset HF. S/p Solumedrol 125mg, Duoneb x 1.   -Supplemental oxygen prn. On 3L NC, BIPAP at bedside   -Duonebs q6 prn  -Management for PNA below   -Echo ordered   -S/p Solumedrol 125mg and Lasix 60mg in ED     #CAP   Management as below     ID  #Sepsis 2/2 CAP   Pt meeting 3/4 SIRS criteria (tachycardic, tachypneic, leukocytosis). Was started on CAP tx at urgent. CXR showing pulmonary edema with suspected b/l opacities. Procalcitonin 3.36  -Start Azithromycin 500mg qd x 3 days, Ceftriaxone 1g qd x 5 days   -F/u Bcxs, UA, Ucx     GI  NABILA     Renal   NABILA     Endo  #Hyperglycemia   Glucose 124 on admission. No known h/o DM or Pre-DM   -F/u HgbA1c      Heme   #Anemia   Found to have an Hgb of 11.6. Per HIE labs, baseline Hgb is 15.   -F/u iron studies   -Maintain active T&S     #Thrombocytosis  Plts 593, likely reactive in setting of ACS and PNA although per HIE labs Plts 412 in 1/2020.   -Monitor CBC     Derm   #Psoriasis   Not on any medications     MSK   #Arthritis   -Tylenol 650mg prn     Allergy  #Seasonal allergies   -C/w home Claritin     F: none  E: replete K <4, Mg <2  N: NPO   GI Ppx: None   DVT Ppx: Hep gtt  Code status: FULL   Dispo: CCU    Patient is a 59yo M PMH chronic low back pain with sciatica, arthritis, psoriasis presenting with 1.5 weeks of shortness of breath and chest pain. Found to be hypoxic and have significant pulmonary edema with new onset HF (bedside echo EF40%), b/l lung opacities with concern for PNA (pt meeting 3/4 SIRS criteria) and late presenting STEMI (trops 0.67, EKG with septal q waves and ST elevations). In the ED, pt was Aspirin/Plavix loaded and started on a Heparin gtt. Patient will be admitted to the CCU for further management.     Neuro  AAOx3     Cardiovascular   #ACS   Presenting with diffuse chest pain. Trops 0.67. EKG with septal q waves and ST elevation c/f late presenting STEMI. No known CAD. S/p Aspirin 325mg, Plavix 600mg, Heparin bolus.   -Start Aspirin 81mg  -Start Plavix 75mg   -Start Hep gtt, monitor PTT (goal 53-73)  -Start Atorvastatin 80mg  -Plan for cath in the AM   -Echo ordered   -Trend cardiac enzymes to peak     #New onset CHF   Presenting with SOB. Pt found to be hypoxic and CXR showing fluid overload. BNP 9234. S/p Lasix 60mg in ED. Likely in setting of ACS. Bedside echo with EF 40%  -Monitor UOP, strict Is and Os  -Lasix prn   -Formal echo ordered     Pulmonary   #Acute hypoxic respiratory failure   On admission hypoxic to 87%. Improvement to 93% on 3L. Likely 2/2 PNA and fluid overloaded in setting of ACS w/ c/f for new onset HF. S/p Solumedrol 125mg, Duoneb x 1.   -Supplemental oxygen prn. On 3L NC, BIPAP at bedside   -Duonebs q6 prn  -Management for PNA below   -Echo ordered   -S/p Solumedrol 125mg and Lasix 60mg in ED     #CAP   Management as below     ID  #Sepsis 2/2 CAP   Pt meeting 3/4 SIRS criteria (tachycardic, tachypneic, leukocytosis). Was started on CAP tx at urgent. CXR showing pulmonary edema with suspected b/l opacities. Procalcitonin 3.36  -Start Azithromycin 500mg qd x 3 days, Ceftriaxone 1g qd x 5 days   -F/u Bcxs, UA, Ucx     GI  NABILA     Renal   NABILA     Endo  #Hyperglycemia   Glucose 124 on admission. No known h/o DM or Pre-DM   -F/u HgbA1c      Heme   #Anemia   Found to have an Hgb of 11.6. Per HIE labs, baseline Hgb is 15.   -F/u iron studies   -Maintain active T&S     #Thrombocytosis  Plts 593, likely reactive in setting of ACS and PNA although per HIE labs Plts 412 in 1/2020.   -Monitor CBC     Derm   #Psoriasis   Not on any medications     MSK   #Arthritis   -Tylenol 650mg prn     Allergy  #Seasonal allergies   -C/w home Claritin     F: none  E: replete K <4, Mg <2  N: NPO   GI Ppx: None   DVT Ppx: Hep gtt  Code status: FULL   Dispo: CCU

## 2021-02-20 NOTE — H&P ADULT - NSHPSOCIALHISTORY_GEN_ALL_CORE
Lives in an [facility]. Tobacco use since late 20s, 1 pck lasts for 1 week. Social alcohol use. Denies rec drug use.     Lives in an [facility].

## 2021-02-20 NOTE — ED PROVIDER NOTE - CARE PLAN
Principal Discharge DX:	ST elevation myocardial infarction (STEMI), unspecified artery  Secondary Diagnosis:	Hypoxia  Secondary Diagnosis:	Pulmonary vascular congestion

## 2021-02-20 NOTE — PROVIDER CONTACT NOTE (CHANGE IN STATUS NOTIFICATION) - SITUATION
Pt is becoming more tachycardiac  high 120s and tachypneic 30 . Increasing O2 saturation demand at 6L NC and saturating 92%

## 2021-02-20 NOTE — ED ADULT NURSE NOTE - PAIN: BODY LOCATION
Received report from night RN Leigh Ann. Resumed care. Patient is A+Ox4. Bed alarm is in use. Patient is educated on the use of the call light and calls appropritaly. Bed is in the lowest position. Patient is medical and not on tele monitor. Patient is resting comfortably in bed. Sitter is a doorway. Patient has no complaint of pain and resting comfortably in bed.    chest tightness

## 2021-02-20 NOTE — H&P ADULT - NSHPLABSRESULTS_GEN_ALL_CORE
LABS:                         11.6   19.03 )-----------( 593      ( 20 Feb 2021 03:31 )             36.2     02-20    137  |  98  |  12  ----------------------------<  124<H>  3.2<L>   |  25  |  0.68    Ca    9.0      20 Feb 2021 03:31  Mg     1.4     02-20    TPro  6.6  /  Alb  3.2<L>  /  TBili  0.5  /  DBili  x   /  AST  22  /  ALT  23  /  AlkPhos  129<H>  02-20    PT/INR - ( 20 Feb 2021 03:31 )   PT: 14.2 sec;   INR: 1.19          PTT - ( 20 Feb 2021 03:31 )  PTT:32.6 sec    CARDIAC MARKERS ( 20 Feb 2021 03:31 )  x     / 0.67 ng/mL / x     / x     / x          Serum Pro-Brain Natriuretic Peptide: 9234 pg/mL (02-20 @ 03:31)    Lactate, Blood: 2.0 mmol/L (02-20 @ 03:31)      RADIOLOGY, EKG & ADDITIONAL TESTS: Reviewed

## 2021-02-20 NOTE — ED ADULT NURSE NOTE - OBJECTIVE STATEMENT
received A&Ox3 60years old male pt with c/o of " I have been feeling shortness of breathe." currently, pt talks in full sentences. pt's O2 at room air is 87%. with oxygen vial nasal canula 3liters, the O2 saturation increases to 96%. pt went to [facility] md earlier today and was diagnosed with pneumonia  pt reports subjective fever, coughing with sputums, chills. pt denies n/v/d. currently, pt is wheezing bilaterally, the EKG shows possible STEMI. pt connected to cardiac monitor and defibrillator pads. at the moment of assessment, pt denies chest pan.

## 2021-02-20 NOTE — ED PROVIDER NOTE - PROGRESS NOTE DETAILS
seen by cards fellow - did bedside echo - wall motion abnormality, EF 40%.  feel that pt likely having delayed presentation STEMI - recommend aspirin, plavix, heparin gtt, admit to CCU

## 2021-02-20 NOTE — H&P ADULT - NSHPPHYSICALEXAM_GEN_ALL_CORE
VITAL SIGNS:  T(C): 36.2 (02-20-21 @ 02:47), Max: 36.2 (02-20-21 @ 02:47)  T(F): 97.1 (02-20-21 @ 02:47), Max: 97.1 (02-20-21 @ 02:47)  HR: 91 (02-20-21 @ 03:20) (91 - 121)  BP: 119/82 (02-20-21 @ 03:20) (119/82 - 128/73)  BP(mean): --  RR: 24 (02-20-21 @ 03:20) (24 - 24)  SpO2: 92% (02-20-21 @ 03:20) (87% - 93%)  Wt(kg): --    PHYSICAL EXAM:    Constitutional: WDWN resting comfortably in bed; NAD  Head: NC/AT  Eyes: PERRL, EOMI, anicteric sclera  ENT: no nasal discharge; uvula midline, no oropharyngeal erythema or exudates; MMM  Neck: supple; no JVD or thyromegaly  Respiratory: CTA B/L; no W/R/R, no retractions  Cardiac: +S1/S2; RRR; no M/R/G; PMI non-displaced  Gastrointestinal: abdomen soft, NT/ND; no rebound or guarding; +BSx4  Back: spine midline, no bony tenderness or step-offs; no CVAT B/L  Extremities: WWP, no clubbing or cyanosis; no peripheral edema  Musculoskeletal: NROM x4; no joint swelling, tenderness or erythema  Vascular: 2+ radial, femoral, DP/PT pulses B/L  Dermatologic: skin warm, dry and intact; no rashes, wounds, or scars  Lymphatic: no submandibular or cervical LAD  Neurologic: AAOx3; CNII-XII grossly intact; no focal deficits  Psychiatric: affect and characteristics of appearance, verbalizations, behaviors are appropriate VITAL SIGNS:  T(C): 36.2 (02-20-21 @ 02:47), Max: 36.2 (02-20-21 @ 02:47)  T(F): 97.1 (02-20-21 @ 02:47), Max: 97.1 (02-20-21 @ 02:47)  HR: 91 (02-20-21 @ 03:20) (91 - 121)  BP: 119/82 (02-20-21 @ 03:20) (119/82 - 128/73)  BP(mean): --  RR: 24 (02-20-21 @ 03:20) (24 - 24)  SpO2: 92% (02-20-21 @ 03:20) (87% - 93%)  Wt(kg): --    PHYSICAL EXAM:    Constitutional: WDWN, resting comfortably in bed; NAD  Head: NC/AT  Eyes: PERRL, EOMI, anicteric sclera  ENT: no nasal discharge; uvula midline, no oropharyngeal erythema or exudates; MMM  Neck: supple; no JVD or thyromegaly  Respiratory: diffuse crackles b/l   Cardiac: +S1/S2; tachycardic, regular rhythm; no M/R/G   Gastrointestinal: abdomen soft, NT/ND; no rebound or guarding; +BSx4  Extremities: WWP, no clubbing or cyanosis; no peripheral edema  Musculoskeletal: NROM x4; no joint swelling, tenderness or erythema  Vascular: 2+ radial, femoral, DP/PT pulses B/L  Dermatologic: skin warm, dry and intact; no rashes, wounds, or scars  Neurologic: AAOx3; CNII-XII grossly intact; no focal deficits  Psychiatric: affect and characteristics of appearance, verbalizations, behaviors are appropriate

## 2021-02-20 NOTE — ED PROVIDER NOTE - CLINICAL SUMMARY MEDICAL DECISION MAKING FREE TEXT BOX
SOB, worsening over past few days, cough, VINCENT, chest pain last week, currently on levaquin for PNA.  hypoxic on RA, improved on nasal cannula.  wheezing/crackles on exam  ekg concerning for STEMI - spoke with Dr. Killian - sent him pictures of ekg - as pt with q waves and no chest pain currently, likely infarcted recently.  does not need cath lab emergently.  recommend calling cardiology fellow for bedside echo.  -check labs, ekg  -cxr  -albuterol/atrovent, solumedrol   -aspirin

## 2021-02-20 NOTE — PROGRESS NOTE ADULT - ATTENDING COMMENTS
60M w/ pmh of chronic back pain, sciatica p/w 1-2 weeks of chest pain followed by SOB/Cough found to have late presenting Anterior wall STEMI c/b acute systolic CHF, admitted to CCU for further mgmt    -CAD - Late presenting Anterior Wall STEMI, event likely occurred 7-10days ago when patient suffered ongoing CP for 1-2 days. CP then subsided and was followed by worsening SOB/Cough; EKG: ST w/ LILLY in V1-6 and Q waves V1-3; TTE: LVEF~20-25%,akinesis of the basal and mid anteroseptum, mid inferoseptum, apical septum, mid and apical anterior wall, apical inferior wall, and apex, no LV thrombus; Normal RV function; Start ASA/Plavix Heparin gtt, plan for Cath on Monday, currently significantly overloaded  -sCHF - s/p Ant Wall STEMI w/ severely reduced LVEF, volume overloaded on exam, but otherwise warm, mentating and w/ normal end-organ function. Currently Sinus tach w/ MAPs in 70s; Started Lasix 60 IV BID, but w/ poor response changed to Bumex gtt. Will consider inotrope if evidence low-output state; No BB in the setting of ADHF; Monitor Is&Os  -ID - High grade fevers; Recently on abx as an outpatient for presumed PNA, though SOB/Cough appeared likely 2/2 ADHF and Pulmonary edema. Pt. is Covid Negative x 1; Will re-swab and add RVP; f/u Blood and Urine Cxs and start Broad spectrum Abx  -DASH diet  -DVT PPx  -Full Code    Joy Awad   CCU Attending

## 2021-02-21 LAB
ALBUMIN SERPL ELPH-MCNC: 2.9 G/DL — LOW (ref 3.3–5)
ALBUMIN SERPL ELPH-MCNC: 3.2 G/DL — LOW (ref 3.3–5)
ALBUMIN SERPL ELPH-MCNC: 3.2 G/DL — LOW (ref 3.3–5)
ALP SERPL-CCNC: 127 U/L — HIGH (ref 40–120)
ALP SERPL-CCNC: 135 U/L — HIGH (ref 40–120)
ALP SERPL-CCNC: 150 U/L — HIGH (ref 40–120)
ALT FLD-CCNC: 20 U/L — SIGNIFICANT CHANGE UP (ref 10–45)
ALT FLD-CCNC: 22 U/L — SIGNIFICANT CHANGE UP (ref 10–45)
ALT FLD-CCNC: 30 U/L — SIGNIFICANT CHANGE UP (ref 10–45)
ANION GAP SERPL CALC-SCNC: 12 MMOL/L — SIGNIFICANT CHANGE UP (ref 5–17)
ANION GAP SERPL CALC-SCNC: 15 MMOL/L — SIGNIFICANT CHANGE UP (ref 5–17)
ANION GAP SERPL CALC-SCNC: 15 MMOL/L — SIGNIFICANT CHANGE UP (ref 5–17)
ANISOCYTOSIS BLD QL: SLIGHT — SIGNIFICANT CHANGE UP
APTT BLD: 32.2 SEC — SIGNIFICANT CHANGE UP (ref 27.5–35.5)
APTT BLD: 40.8 SEC — HIGH (ref 27.5–35.5)
APTT BLD: 43.2 SEC — HIGH (ref 27.5–35.5)
APTT BLD: 44.1 SEC — HIGH (ref 27.5–35.5)
AST SERPL-CCNC: 27 U/L — SIGNIFICANT CHANGE UP (ref 10–40)
AST SERPL-CCNC: 31 U/L — SIGNIFICANT CHANGE UP (ref 10–40)
AST SERPL-CCNC: 46 U/L — HIGH (ref 10–40)
BASE EXCESS BLDA CALC-SCNC: 9.5 MMOL/L — HIGH (ref -2–3)
BASOPHILS # BLD AUTO: 0 K/UL — SIGNIFICANT CHANGE UP (ref 0–0.2)
BASOPHILS NFR BLD AUTO: 0 % — SIGNIFICANT CHANGE UP (ref 0–2)
BILIRUB SERPL-MCNC: 0.4 MG/DL — SIGNIFICANT CHANGE UP (ref 0.2–1.2)
BILIRUB SERPL-MCNC: 0.4 MG/DL — SIGNIFICANT CHANGE UP (ref 0.2–1.2)
BILIRUB SERPL-MCNC: 0.5 MG/DL — SIGNIFICANT CHANGE UP (ref 0.2–1.2)
BUN SERPL-MCNC: 20 MG/DL — SIGNIFICANT CHANGE UP (ref 7–23)
BUN SERPL-MCNC: 21 MG/DL — SIGNIFICANT CHANGE UP (ref 7–23)
CALCIUM SERPL-MCNC: 9 MG/DL — SIGNIFICANT CHANGE UP (ref 8.4–10.5)
CALCIUM SERPL-MCNC: 9 MG/DL — SIGNIFICANT CHANGE UP (ref 8.4–10.5)
CALCIUM SERPL-MCNC: 9.2 MG/DL — SIGNIFICANT CHANGE UP (ref 8.4–10.5)
CHLORIDE SERPL-SCNC: 88 MMOL/L — LOW (ref 96–108)
CHLORIDE SERPL-SCNC: 88 MMOL/L — LOW (ref 96–108)
CHLORIDE SERPL-SCNC: 90 MMOL/L — LOW (ref 96–108)
CK MB CFR SERPL CALC: 1.4 NG/ML — SIGNIFICANT CHANGE UP (ref 0–6.7)
CK SERPL-CCNC: 402 U/L — HIGH (ref 30–200)
CO2 SERPL-SCNC: 30 MMOL/L — SIGNIFICANT CHANGE UP (ref 22–31)
CO2 SERPL-SCNC: 32 MMOL/L — HIGH (ref 22–31)
CO2 SERPL-SCNC: 33 MMOL/L — HIGH (ref 22–31)
CREAT SERPL-MCNC: 1.07 MG/DL — SIGNIFICANT CHANGE UP (ref 0.5–1.3)
CREAT SERPL-MCNC: 1.1 MG/DL — SIGNIFICANT CHANGE UP (ref 0.5–1.3)
CREAT SERPL-MCNC: 1.1 MG/DL — SIGNIFICANT CHANGE UP (ref 0.5–1.3)
EOSINOPHIL # BLD AUTO: 0 K/UL — SIGNIFICANT CHANGE UP (ref 0–0.5)
EOSINOPHIL NFR BLD AUTO: 0 % — SIGNIFICANT CHANGE UP (ref 0–6)
GAS PNL BLDA: SIGNIFICANT CHANGE UP
GLUCOSE BLDC GLUCOMTR-MCNC: 127 MG/DL — HIGH (ref 70–99)
GLUCOSE BLDC GLUCOMTR-MCNC: 96 MG/DL — SIGNIFICANT CHANGE UP (ref 70–99)
GLUCOSE SERPL-MCNC: 121 MG/DL — HIGH (ref 70–99)
GLUCOSE SERPL-MCNC: 129 MG/DL — HIGH (ref 70–99)
GLUCOSE SERPL-MCNC: 144 MG/DL — HIGH (ref 70–99)
HCO3 BLDA-SCNC: 33 MMOL/L — HIGH (ref 21–28)
HCT VFR BLD CALC: 35.8 % — LOW (ref 39–50)
HCV AB S/CO SERPL IA: 0.13 S/CO — SIGNIFICANT CHANGE UP
HCV AB SERPL-IMP: SIGNIFICANT CHANGE UP
HGB BLD-MCNC: 11.8 G/DL — LOW (ref 13–17)
INR BLD: 1.26 — HIGH (ref 0.88–1.16)
INR BLD: 1.3 — HIGH (ref 0.88–1.16)
LACTATE SERPL-SCNC: 1 MMOL/L — SIGNIFICANT CHANGE UP (ref 0.5–2)
LACTATE SERPL-SCNC: 1.2 MMOL/L — SIGNIFICANT CHANGE UP (ref 0.5–2)
LACTATE SERPL-SCNC: 1.8 MMOL/L — SIGNIFICANT CHANGE UP (ref 0.5–2)
LACTATE SERPL-SCNC: 2.5 MMOL/L — HIGH (ref 0.5–2)
LYMPHOCYTES # BLD AUTO: 1.95 K/UL — SIGNIFICANT CHANGE UP (ref 1–3.3)
LYMPHOCYTES # BLD AUTO: 8.7 % — LOW (ref 13–44)
MACROCYTES BLD QL: SLIGHT — SIGNIFICANT CHANGE UP
MAGNESIUM SERPL-MCNC: 2.1 MG/DL — SIGNIFICANT CHANGE UP (ref 1.6–2.6)
MAGNESIUM SERPL-MCNC: 2.1 MG/DL — SIGNIFICANT CHANGE UP (ref 1.6–2.6)
MANUAL SMEAR VERIFICATION: SIGNIFICANT CHANGE UP
MCHC RBC-ENTMCNC: 29.7 PG — SIGNIFICANT CHANGE UP (ref 27–34)
MCHC RBC-ENTMCNC: 33 GM/DL — SIGNIFICANT CHANGE UP (ref 32–36)
MCV RBC AUTO: 90.2 FL — SIGNIFICANT CHANGE UP (ref 80–100)
MICROCYTES BLD QL: SLIGHT — SIGNIFICANT CHANGE UP
MONOCYTES # BLD AUTO: 0.96 K/UL — HIGH (ref 0–0.9)
MONOCYTES NFR BLD AUTO: 4.3 % — SIGNIFICANT CHANGE UP (ref 2–14)
NEUTROPHILS # BLD AUTO: 19.46 K/UL — HIGH (ref 1.8–7.4)
NEUTROPHILS NFR BLD AUTO: 87 % — HIGH (ref 43–77)
OVALOCYTES BLD QL SMEAR: SLIGHT — SIGNIFICANT CHANGE UP
PCO2 BLDA: 38 MMHG — SIGNIFICANT CHANGE UP (ref 35–48)
PH BLDA: 7.55 — HIGH (ref 7.35–7.45)
PHOSPHATE SERPL-MCNC: 3.1 MG/DL — SIGNIFICANT CHANGE UP (ref 2.5–4.5)
PHOSPHATE SERPL-MCNC: 4.3 MG/DL — SIGNIFICANT CHANGE UP (ref 2.5–4.5)
PLAT MORPH BLD: ABNORMAL
PLATELET # BLD AUTO: 727 K/UL — HIGH (ref 150–400)
PO2 BLDA: 86 MMHG — SIGNIFICANT CHANGE UP (ref 83–108)
POIKILOCYTOSIS BLD QL AUTO: SLIGHT — SIGNIFICANT CHANGE UP
POLYCHROMASIA BLD QL SMEAR: SIGNIFICANT CHANGE UP
POTASSIUM SERPL-MCNC: 3.8 MMOL/L — SIGNIFICANT CHANGE UP (ref 3.5–5.3)
POTASSIUM SERPL-MCNC: 3.9 MMOL/L — SIGNIFICANT CHANGE UP (ref 3.5–5.3)
POTASSIUM SERPL-MCNC: 4.2 MMOL/L — SIGNIFICANT CHANGE UP (ref 3.5–5.3)
POTASSIUM SERPL-SCNC: 3.8 MMOL/L — SIGNIFICANT CHANGE UP (ref 3.5–5.3)
POTASSIUM SERPL-SCNC: 3.9 MMOL/L — SIGNIFICANT CHANGE UP (ref 3.5–5.3)
POTASSIUM SERPL-SCNC: 4.2 MMOL/L — SIGNIFICANT CHANGE UP (ref 3.5–5.3)
PROT SERPL-MCNC: 7.1 G/DL — SIGNIFICANT CHANGE UP (ref 6–8.3)
PROT SERPL-MCNC: 7.4 G/DL — SIGNIFICANT CHANGE UP (ref 6–8.3)
PROT SERPL-MCNC: 7.4 G/DL — SIGNIFICANT CHANGE UP (ref 6–8.3)
PROTHROM AB SERPL-ACNC: 15 SEC — HIGH (ref 10.6–13.6)
PROTHROM AB SERPL-ACNC: 15.4 SEC — HIGH (ref 10.6–13.6)
RAPID RVP RESULT: SIGNIFICANT CHANGE UP
RBC # BLD: 3.97 M/UL — LOW (ref 4.2–5.8)
RBC # FLD: 14.1 % — SIGNIFICANT CHANGE UP (ref 10.3–14.5)
RBC BLD AUTO: ABNORMAL
SAO2 % BLDA: 97 % — SIGNIFICANT CHANGE UP (ref 95–100)
SARS-COV-2 IGG SERPL QL IA: POSITIVE
SARS-COV-2 IGM SERPL IA-ACNC: 2 INDEX — HIGH
SARS-COV-2 RNA SPEC QL NAA+PROBE: SIGNIFICANT CHANGE UP
SODIUM SERPL-SCNC: 133 MMOL/L — LOW (ref 135–145)
SODIUM SERPL-SCNC: 135 MMOL/L — SIGNIFICANT CHANGE UP (ref 135–145)
SODIUM SERPL-SCNC: 135 MMOL/L — SIGNIFICANT CHANGE UP (ref 135–145)
SPHEROCYTES BLD QL SMEAR: SLIGHT — SIGNIFICANT CHANGE UP
T3FREE SERPL-MCNC: 2.28 PG/ML — SIGNIFICANT CHANGE UP (ref 1.8–4.6)
TROPONIN T SERPL-MCNC: 0.83 NG/ML — CRITICAL HIGH (ref 0–0.01)
TROPONIN T SERPL-MCNC: 0.88 NG/ML — CRITICAL HIGH (ref 0–0.01)
WBC # BLD: 22.37 K/UL — HIGH (ref 3.8–10.5)
WBC # FLD AUTO: 22.37 K/UL — HIGH (ref 3.8–10.5)

## 2021-02-21 PROCEDURE — 71045 X-RAY EXAM CHEST 1 VIEW: CPT | Mod: 26,76

## 2021-02-21 PROCEDURE — 99291 CRITICAL CARE FIRST HOUR: CPT

## 2021-02-21 RX ORDER — TICAGRELOR 90 MG/1
90 TABLET ORAL EVERY 12 HOURS
Refills: 0 | Status: DISCONTINUED | OUTPATIENT
Start: 2021-02-22 | End: 2021-02-27

## 2021-02-21 RX ORDER — HEPARIN SODIUM 5000 [USP'U]/ML
1650 INJECTION INTRAVENOUS; SUBCUTANEOUS
Qty: 25000 | Refills: 0 | Status: DISCONTINUED | OUTPATIENT
Start: 2021-02-21 | End: 2021-02-22

## 2021-02-21 RX ORDER — HEPARIN SODIUM 5000 [USP'U]/ML
1650 INJECTION INTRAVENOUS; SUBCUTANEOUS
Qty: 25000 | Refills: 0 | Status: DISCONTINUED | OUTPATIENT
Start: 2021-02-21 | End: 2021-02-21

## 2021-02-21 RX ORDER — HEPARIN SODIUM 5000 [USP'U]/ML
1350 INJECTION INTRAVENOUS; SUBCUTANEOUS
Qty: 25000 | Refills: 0 | Status: DISCONTINUED | OUTPATIENT
Start: 2021-02-21 | End: 2021-02-21

## 2021-02-21 RX ORDER — BUMETANIDE 0.25 MG/ML
2 INJECTION INTRAMUSCULAR; INTRAVENOUS EVERY 12 HOURS
Refills: 0 | Status: DISCONTINUED | OUTPATIENT
Start: 2021-02-21 | End: 2021-02-22

## 2021-02-21 RX ORDER — TICAGRELOR 90 MG/1
180 TABLET ORAL ONCE
Refills: 0 | Status: COMPLETED | OUTPATIENT
Start: 2021-02-21 | End: 2021-02-21

## 2021-02-21 RX ORDER — POTASSIUM CHLORIDE 20 MEQ
20 PACKET (EA) ORAL ONCE
Refills: 0 | Status: COMPLETED | OUTPATIENT
Start: 2021-02-21 | End: 2021-02-21

## 2021-02-21 RX ORDER — ACETAMINOPHEN 500 MG
325 TABLET ORAL ONCE
Refills: 0 | Status: COMPLETED | OUTPATIENT
Start: 2021-02-21 | End: 2021-02-21

## 2021-02-21 RX ORDER — ACETAMINOPHEN 500 MG
1000 TABLET ORAL ONCE
Refills: 0 | Status: DISCONTINUED | OUTPATIENT
Start: 2021-02-21 | End: 2021-02-21

## 2021-02-21 RX ADMIN — Medication 650 MILLIGRAM(S): at 05:43

## 2021-02-21 RX ADMIN — Medication 200 MILLIGRAM(S): at 12:26

## 2021-02-21 RX ADMIN — Medication 200 MILLIGRAM(S): at 00:05

## 2021-02-21 RX ADMIN — HEPARIN SODIUM 15 UNIT(S)/HR: 5000 INJECTION INTRAVENOUS; SUBCUTANEOUS at 09:28

## 2021-02-21 RX ADMIN — Medication 20 MILLIEQUIVALENT(S): at 03:08

## 2021-02-21 RX ADMIN — PIPERACILLIN AND TAZOBACTAM 200 GRAM(S): 4; .5 INJECTION, POWDER, LYOPHILIZED, FOR SOLUTION INTRAVENOUS at 09:01

## 2021-02-21 RX ADMIN — Medication 20 MILLIEQUIVALENT(S): at 05:17

## 2021-02-21 RX ADMIN — Medication 5 MILLIGRAM(S): at 00:05

## 2021-02-21 RX ADMIN — BUMETANIDE 2.5 MG/HR: 0.25 INJECTION INTRAMUSCULAR; INTRAVENOUS at 00:00

## 2021-02-21 RX ADMIN — LORATADINE 10 MILLIGRAM(S): 10 TABLET ORAL at 12:26

## 2021-02-21 RX ADMIN — Medication 650 MILLIGRAM(S): at 15:20

## 2021-02-21 RX ADMIN — BUMETANIDE 2 MILLIGRAM(S): 0.25 INJECTION INTRAMUSCULAR; INTRAVENOUS at 22:35

## 2021-02-21 RX ADMIN — Medication 650 MILLIGRAM(S): at 23:22

## 2021-02-21 RX ADMIN — PIPERACILLIN AND TAZOBACTAM 200 GRAM(S): 4; .5 INJECTION, POWDER, LYOPHILIZED, FOR SOLUTION INTRAVENOUS at 03:10

## 2021-02-21 RX ADMIN — HEPARIN SODIUM 13.5 UNIT(S)/HR: 5000 INJECTION INTRAVENOUS; SUBCUTANEOUS at 03:15

## 2021-02-21 RX ADMIN — CLOPIDOGREL BISULFATE 75 MILLIGRAM(S): 75 TABLET, FILM COATED ORAL at 05:17

## 2021-02-21 RX ADMIN — TICAGRELOR 180 MILLIGRAM(S): 90 TABLET ORAL at 22:45

## 2021-02-21 RX ADMIN — Medication 200 MILLIGRAM(S): at 05:17

## 2021-02-21 RX ADMIN — Medication 325 MILLIGRAM(S): at 15:40

## 2021-02-21 RX ADMIN — HEPARIN SODIUM 16.5 UNIT(S)/HR: 5000 INJECTION INTRAVENOUS; SUBCUTANEOUS at 15:17

## 2021-02-21 RX ADMIN — CHLORHEXIDINE GLUCONATE 1 APPLICATION(S): 213 SOLUTION TOPICAL at 05:18

## 2021-02-21 RX ADMIN — Medication 200 MILLIGRAM(S): at 22:24

## 2021-02-21 RX ADMIN — Medication 5 MILLIGRAM(S): at 22:19

## 2021-02-21 RX ADMIN — PIPERACILLIN AND TAZOBACTAM 200 GRAM(S): 4; .5 INJECTION, POWDER, LYOPHILIZED, FOR SOLUTION INTRAVENOUS at 22:19

## 2021-02-21 RX ADMIN — BUMETANIDE 2 MILLIGRAM(S): 0.25 INJECTION INTRAMUSCULAR; INTRAVENOUS at 11:00

## 2021-02-21 RX ADMIN — PIPERACILLIN AND TAZOBACTAM 200 GRAM(S): 4; .5 INJECTION, POWDER, LYOPHILIZED, FOR SOLUTION INTRAVENOUS at 17:32

## 2021-02-21 RX ADMIN — ATORVASTATIN CALCIUM 80 MILLIGRAM(S): 80 TABLET, FILM COATED ORAL at 22:19

## 2021-02-21 RX ADMIN — Medication 81 MILLIGRAM(S): at 05:17

## 2021-02-21 NOTE — PROCEDURE NOTE - NSPROCDETAILS_GEN_ALL_CORE
location identified, draped/prepped, sterile technique used, needle inserted/introduced/sutured in place/all materials/supplies accounted for at end of procedure

## 2021-02-21 NOTE — PROGRESS NOTE ADULT - ATTENDING COMMENTS
60M w/ pmh of chronic back pain, sciatica p/w 1-2 weeks of chest pain followed by SOB/Cough found to have late presenting Anterior wall STEMI c/b acute systolic CHF, admitted to CCU for further mgmt    -CAD - Late presenting Anterior Wall STEMI, event likely occurred 7-10days ago when patient suffered ongoing CP for 1-2 days. CP then subsided and was followed by worsening SOB/Cough; EKG: ST w/ LILLY in V1-6 and Q waves V1-4; TTE: LVEF~20-25%,akinesis of the basal and mid anteroseptum, mid inferoseptum, apical septum, mid and apical anterior wall, apical inferior wall, and apex, no LV thrombus; Normal RV function; c/w ASA/Brilinta Heparin gtt; Cath initially planned for Monday, postponed d/t recurrent fevers; Will obtain Right & Left Heart cath once pt. is afebrille  -sCHF - s/p Ant Wall STEMI w/ severely reduced LVEF, volume overloaded on exam but improving w/ IV diuresis; Otherwise warm, mentating and w/ normal end-organ function. Currently Sinus tach w/ MAPs in 60s; c/w Bumex 2mg IV BID; Will consider inotrope if evidence low-output state; Initial Lactate(2.6) likely d/t work of breathing/hypoxia, has since cleared; No BB in the setting of ADHF; Monitor Is&Os; Given late presentation of Anterior wall STEMI and severely reduced EF - pt. likely to require advanced HF therapies in the near future  -ID - Persistent high grade fevers; Elevated inflammatory markers; Blood Cx's NGTD, UA Negative; Covid PCR (-) x 3; Covid AB (+); Given High suspicion for Covid in the setting of recurrent fevers - pt. transferred to [address] [place] [address]; Will hold off Remedesivir at this time and will not initiate Steroids in the setting of unrevascularized STEMI; Will c/w Empric Abx - Zosyn monotherapy; MRSA (-)  -DASH diet  -DVT PPx  -Full Code  -Dispo: Transfer to [address] [place] [address]; Will remain under CCU service    Joy Awad   CCU Attending

## 2021-02-21 NOTE — PROGRESS NOTE ADULT - SUBJECTIVE AND OBJECTIVE BOX
*** in progress ***    OVERNIGHT EVENTS:    SUBJECTIVE / INTERVAL HPI: Patient seen and examined at bedside.     ROS: negative unless otherwise stated above.    VITAL SIGNS:  Vital Signs Last 24 Hrs  T(C): 38.6 (2021 05:00), Max: 39.8 (2021 16:00)  T(F): 101.5 (2021 05:00), Max: 103.6 (2021 16:00)  HR: 110 (2021 08:00) (105 - 131)  BP: 86/59 (2021 08:00) (86/59 - 121/76)  BP(mean): 68 (2021 08:00) (68 - 92)  RR: 31 (2021 08:00) (23 - 42)  SpO2: 99% (2021 08:00) (90% - 100%)    PHYSICAL EXAM:    General: WDWN  HEENT: NC/AT; PERRL, anicteric sclera; MMM  Neck: supple  Cardiovascular: NRRR; +S1/S2, no r/m/g  Respiratory: CTA B/L; no W/R/R; no retractions or accessory muscle use  Gastrointestinal: soft, NT/ND; +BSx4  Extremities: WWP; no edema, clubbing or cyanosis  Vascular: 2+ radial, DP/PT pulses B/L  Neurological: AAOx3; no focal deficits    MEDICATIONS:  MEDICATIONS  (STANDING):  aspirin enteric coated 81 milliGRAM(s) Oral every 24 hours  atorvastatin 80 milliGRAM(s) Oral at bedtime  chlorhexidine 2% Cloths 1 Application(s) Topical <User Schedule>  clopidogrel Tablet 75 milliGRAM(s) Oral every 24 hours  heparin  Infusion 1350 Unit(s)/Hr (13.5 mL/Hr) IV Continuous <Continuous>  loratadine 10 milliGRAM(s) Oral daily  melatonin 5 milliGRAM(s) Oral at bedtime  piperacillin/tazobactam IVPB.. 4.5 Gram(s) IV Intermittent every 6 hours    MEDICATIONS  (PRN):  acetaminophen   Tablet .. 650 milliGRAM(s) Oral every 6 hours PRN Temp greater or equal to 38C (100.4F), Mild Pain (1 - 3), Moderate Pain (4 - 6)  albuterol/ipratropium for Nebulization 3 milliLiter(s) Nebulizer every 6 hours PRN Shortness of Breath and/or Wheezing  guaiFENesin   Syrup  (Sugar-Free) 200 milliGRAM(s) Oral every 6 hours PRN Cough      ALLERGIES:  Allergies    No Known Allergies    Intolerances        LABS:                        11.8   22.37 )-----------( 727      ( 2021 04:30 )             35.8         133<L>  |  88<L>  |  21  ----------------------------<  129<H>  3.9   |  33<H>  |  1.10    Ca    9.0      2021 04:30  Phos  4.3       Mg     2.1         TPro  7.4  /  Alb  3.2<L>  /  TBili  0.5  /  DBili  x   /  AST  27  /  ALT  20  /  AlkPhos  127<H>      PT/INR - ( 2021 00:23 )   PT: 15.0 sec;   INR: 1.26          PTT - ( 2021 00:23 )  PTT:43.2 sec  Urinalysis Basic - ( 2021 16:39 )    Color: Yellow / Appearance: Clear / S.010 / pH: x  Gluc: x / Ketone: NEGATIVE  / Bili: Negative / Urobili: 0.2 E.U./dL   Blood: x / Protein: NEGATIVE mg/dL / Nitrite: NEGATIVE   Leuk Esterase: NEGATIVE / RBC: x / WBC x   Sq Epi: x / Non Sq Epi: x / Bacteria: x      CAPILLARY BLOOD GLUCOSE          RADIOLOGY & ADDITIONAL TESTS: Reviewed. OVERNIGHT EVENTS: Febrile to 103.5, Tylenol given. sBPs dropping to 90s, Bumex gtt decreased to 0.5mg/hr. Placed on HFNC 50/40 for increased wob. Ordered Melatonin, Robitussin. Lactate 2.6, Cr rising. Febrile again to 102.5 at 1am. COVID/RVP swab obtained, neg. CXR improved from yest morning but still congested. Bumex gtt DCed around 2am due to c/f overdiuereis. Left radial a-line placed. Repeat lactate 1.0. AM.     SUBJECTIVE / INTERVAL HPI: Patient seen and examined at bedside. Patient reporting fevers, improving SOB, cough w/ sputum and flecks of blood. Patient denying chest pain. Patient denies chills, N/S, HA, change in vision/hearing, N/V, cp, abdominal pain, diarrhea, constipation, hematochezia/melena, dysuria, hematuria, new onset weakness/numbness    ROS: negative unless otherwise stated above.    VITAL SIGNS:  Vital Signs Last 24 Hrs  T(C): 38.6 (2021 05:00), Max: 39.8 (2021 16:00)  T(F): 101.5 (2021 05:00), Max: 103.6 (2021 16:00)  HR: 110 (2021 08:00) (105 - 131)  BP: 86/59 (2021 08:00) (86/59 - 121/76)  BP(mean): 68 (2021 08:00) (68 - 92)  RR: 31 (2021 08:00) (23 - 42)  SpO2: 99% (2021 08:00) (90% - 100%)    PHYSICAL EXAM:    General: Fatigued appearing, NAD, resting comfortably in bed, speaking in full sentences on HFNC  HEENT: NC/AT  Cardiovascular: tachycardic; +S1/S2, no r/m/g  Respiratory: b/l crackles; no W/R/R; no retractions or accessory muscle use; intermittently coughing on exam  Gastrointestinal: soft, NT/ND; +BSx4  Extremities: WWP; no edema, clubbing or cyanosis  Vascular: 2+ radial, DP/PT pulses B/L  Neurological: AAOx3; no focal deficits    MEDICATIONS:  MEDICATIONS  (STANDING):  aspirin enteric coated 81 milliGRAM(s) Oral every 24 hours  atorvastatin 80 milliGRAM(s) Oral at bedtime  chlorhexidine 2% Cloths 1 Application(s) Topical <User Schedule>  clopidogrel Tablet 75 milliGRAM(s) Oral every 24 hours  heparin  Infusion 1350 Unit(s)/Hr (13.5 mL/Hr) IV Continuous <Continuous>  loratadine 10 milliGRAM(s) Oral daily  melatonin 5 milliGRAM(s) Oral at bedtime  piperacillin/tazobactam IVPB.. 4.5 Gram(s) IV Intermittent every 6 hours    MEDICATIONS  (PRN):  acetaminophen   Tablet .. 650 milliGRAM(s) Oral every 6 hours PRN Temp greater or equal to 38C (100.4F), Mild Pain (1 - 3), Moderate Pain (4 - 6)  albuterol/ipratropium for Nebulization 3 milliLiter(s) Nebulizer every 6 hours PRN Shortness of Breath and/or Wheezing  guaiFENesin   Syrup  (Sugar-Free) 200 milliGRAM(s) Oral every 6 hours PRN Cough      ALLERGIES:  Allergies    No Known Allergies    Intolerances        LABS:                        11.8   22.37 )-----------( 727      ( 2021 04:30 )             35.8     02-21    133<L>  |  88<L>  |  21  ----------------------------<  129<H>  3.9   |  33<H>  |  1.10    Ca    9.0      2021 04:30  Phos  4.3     02-  Mg     2.1     -    TPro  7.4  /  Alb  3.2<L>  /  TBili  0.5  /  DBili  x   /  AST  27  /  ALT  20  /  AlkPhos  127<H>  02-21    PT/INR - ( 2021 00:23 )   PT: 15.0 sec;   INR: 1.26          PTT - ( 2021 00:23 )  PTT:43.2 sec  Urinalysis Basic - ( 2021 16:39 )    Color: Yellow / Appearance: Clear / S.010 / pH: x  Gluc: x / Ketone: NEGATIVE  / Bili: Negative / Urobili: 0.2 E.U./dL   Blood: x / Protein: NEGATIVE mg/dL / Nitrite: NEGATIVE   Leuk Esterase: NEGATIVE / RBC: x / WBC x   Sq Epi: x / Non Sq Epi: x / Bacteria: x      CAPILLARY BLOOD GLUCOSE          RADIOLOGY & ADDITIONAL TESTS: Reviewed.

## 2021-02-21 NOTE — PROGRESS NOTE ADULT - ASSESSMENT
Patient is a 61yo M PMH chronic low back pain with sciatica, arthritis, psoriasis presenting with 1.5 weeks of shortness of breath and chest pain. Found to be hypoxic and have significant pulmonary edema with new onset HF (bedside echo EF40%), b/l lung opacities with concern for PNA (pt meeting 3/4 SIRS criteria) and late presenting STEMI (trops 0.67, EKG with septal q waves and ST elevations). In the ED, pt was Aspirin/Plavix loaded and started on a Heparin gtt. Patient will be admitted to the CCU for further management.     Neuro  AAOx3     Cardiovascular   #ACS   Presenting with diffuse chest pain. Trops 0.67 (repeat 0.52). EKG with septal q waves and ST elevation c/f late presenting STEMI. No known CAD. S/p Aspirin 325mg, Plavix 600mg, Heparin bolus. TTE  this admission w/ Severely reduced left ventricular systolic function and regional wall motion abnormalities consistent with ischemic heart disease. LV EF 24%.  -s/p hep bolus. ptt subtherapeutic hep increased from 8-10 (goal 53-73)  PLAN  -c/w Aspirin 81mg  -dc Plavix 75mg, plan for Brilinta 180mg tonight at 9pm and 90m g bid starting 2/22 AM  -c/w Atorvastatin 80mg  -c/w heparin gtt  -f/u q6 PTT (goal 53-73)  -Plan for cath once medically optimized    #New onset CHF   Presenting with SOB. Pt found to be hypoxic and CXR showing fluid overload. BNP 9234. S/p Lasix 60mg in ED. Likely in setting of ACS. Pt not responding appropriately to 60 IV Lasix x 2 requiring 5mg metolazone, 2mg Bumex STAT then 1mg/hr Bumex infusion.   PLAN  -bumex gtt dc'd, start bumex 2mg IV bid  -Currently on HFNC, BIPAP at bedside  -f/u q6 CMP, replete lytes and monitor LFTs to assess for organ perfusion    Pulmonary   #Acute hypoxic respiratory failure   On admission hypoxic to 87%. Improvement to 93% on 3L. Likely 2/2 PNA (COVID vs bacterial) and fluid overloaded in setting of ACS w/ c/f for new onset HF. S/p Solumedrol 125mg, Duoneb x 1.   -Supplemental oxygen prn. Pt w. inc O2 NC increased from 3 to 6L sating in low 90's, w/ increased WOB requiring Bumex drip.  -S/p Solumedrol 125mg   PLAN  -Duonebs q6 prn  -Management for PNA below  -c/w Bumex 2mg IV bid as above  -Currently on HFNC, BIPAP at bedside  -f/u q6 CMP, replete lytes and monitor LFTs to assess for organ perfusion    #PNA, CAP vs COVID  Management as below     ID  #Sepsis 2/2 CAP   Pt meeting 3/4 SIRS criteria (tachycardic, tachypneic, leukocytosis). Was started on CAP tx at urgent care. CXR showing pulmonary edema with suspected b/l opacities. Procalcitonin 3.36. s/p Azithromycin 500mg and Ceftriaxone 1g in ED. Pt initially dc'd abx due to low suspicion for infection w/ SIRS + leukocytosis presumed to be 2/2 to CHF exacerbation.  -Pt spiked new fever of 103.6 rectal, tachycardic to 131 and tachypneic to 26. Pt restarted on vanc + zosyn. w/ source still likely to be PNA  -pt w/ increasing white count   -UA negative, Urine strep and legi negative    -Patient also COVID PCR neg x3 but positive for COVID antibodies. Patient w/ high fevers, increased WBC however hemodynamically stable s/p bumex gtt which increases suspicion for COVID PNA vs less likely sepsis 2/2 bacterial infection given patient maintaining pressures i/s/o diuresis (would expect patient to go into septic shock if bacterial PNA)  PLAN  -UA (2/29) neg  -BCx (2/20 AM and PM) NGTD  -MRSA swab neg -> vanc dc'd  -Lactate normalized  -C/w zosyn 4.5g q6h x7 days (2/20-2/26)  -F/u sputum cx  -Will consider deescalating or discontinuing ABX if no culture data returns  -Transfer to 3WO COVID MICU under CCU for high suspicion for COVID    GI  NABILA    Renal   NABILA    Endo  #Hyperglycemia   Glucose 124 on admission. No known h/o DM or Pre-DM   -HgbA1c wnl  PLAN  -c/w ISS     Heme   #Anemia   Found to have an Hgb of 11.6. Per HIE labs, baseline Hgb is 15.   -iron studies c/w anemia of chronic disease  PLAN  -in setting of sepsis will hold iron tx for now   -Maintain active T&S    #Thrombocytosis  -Plts 593, likely reactive in setting of ACS and PNA although per HIE labs Plts 412 in 1/2020.   PLAN  -Monitor CBC     Derm   #Psoriasis   Not on any medications     MSK   #Arthritis   -Tylenol 650mg prn     Allergy  #Seasonal allergies   -C/w home Claritin     F: none  E: replete K <4, Mg <2  N: DASH/TLC, fluid restriction    GI Ppx: None   DVT Ppx: Hep gtt  Code status: FULL   Dispo: CCU

## 2021-02-22 LAB
ALBUMIN SERPL ELPH-MCNC: 3.1 G/DL — LOW (ref 3.3–5)
ALP SERPL-CCNC: 155 U/L — HIGH (ref 40–120)
ALT FLD-CCNC: 37 U/L — SIGNIFICANT CHANGE UP (ref 10–45)
ANION GAP SERPL CALC-SCNC: 13 MMOL/L — SIGNIFICANT CHANGE UP (ref 5–17)
APTT BLD: 50 SEC — HIGH (ref 27.5–35.5)
APTT BLD: 55.7 SEC — HIGH (ref 27.5–35.5)
APTT BLD: 64.1 SEC — HIGH (ref 27.5–35.5)
AST SERPL-CCNC: 40 U/L — SIGNIFICANT CHANGE UP (ref 10–40)
BASE EXCESS BLDA CALC-SCNC: 11.7 MMOL/L — HIGH (ref -2–3)
BASOPHILS # BLD AUTO: 0.04 K/UL — SIGNIFICANT CHANGE UP (ref 0–0.2)
BASOPHILS NFR BLD AUTO: 0.2 % — SIGNIFICANT CHANGE UP (ref 0–2)
BILIRUB SERPL-MCNC: 0.5 MG/DL — SIGNIFICANT CHANGE UP (ref 0.2–1.2)
BLD GP AB SCN SERPL QL: NEGATIVE — SIGNIFICANT CHANGE UP
BUN SERPL-MCNC: 32 MG/DL — HIGH (ref 7–23)
BUN SERPL-MCNC: 32 MG/DL — HIGH (ref 7–23)
CALCIUM SERPL-MCNC: 9.4 MG/DL — SIGNIFICANT CHANGE UP (ref 8.4–10.5)
CHLORIDE SERPL-SCNC: 85 MMOL/L — LOW (ref 96–108)
CO2 SERPL-SCNC: 36 MMOL/L — HIGH (ref 22–31)
CREAT SERPL-MCNC: 0.92 MG/DL — SIGNIFICANT CHANGE UP (ref 0.5–1.3)
CRP SERPL-MCNC: 31.99 MG/DL — HIGH (ref 0–0.4)
D DIMER BLD IA.RAPID-MCNC: 529 NG/ML DDU — HIGH
EOSINOPHIL # BLD AUTO: 0.04 K/UL — SIGNIFICANT CHANGE UP (ref 0–0.5)
EOSINOPHIL NFR BLD AUTO: 0.2 % — SIGNIFICANT CHANGE UP (ref 0–6)
ERYTHROCYTE [SEDIMENTATION RATE] IN BLOOD: 124 MM/HR — HIGH
GAS PNL BLDA: SIGNIFICANT CHANGE UP
GAS PNL BLDA: SIGNIFICANT CHANGE UP
GLUCOSE SERPL-MCNC: 143 MG/DL — HIGH (ref 70–99)
GRAM STN FLD: SIGNIFICANT CHANGE UP
HCO3 BLDA-SCNC: 35 MMOL/L — HIGH (ref 21–28)
HCT VFR BLD CALC: 37.5 % — LOW (ref 39–50)
HGB BLD-MCNC: 12.3 G/DL — LOW (ref 13–17)
IMM GRANULOCYTES NFR BLD AUTO: 1.1 % — SIGNIFICANT CHANGE UP (ref 0–1.5)
LACTATE SERPL-SCNC: 1.6 MMOL/L — SIGNIFICANT CHANGE UP (ref 0.5–2)
LYMPHOCYTES # BLD AUTO: 1.04 K/UL — SIGNIFICANT CHANGE UP (ref 1–3.3)
LYMPHOCYTES # BLD AUTO: 6.3 % — LOW (ref 13–44)
MAGNESIUM SERPL-MCNC: 1.9 MG/DL — SIGNIFICANT CHANGE UP (ref 1.6–2.6)
MCHC RBC-ENTMCNC: 29.4 PG — SIGNIFICANT CHANGE UP (ref 27–34)
MCHC RBC-ENTMCNC: 32.8 GM/DL — SIGNIFICANT CHANGE UP (ref 32–36)
MCV RBC AUTO: 89.7 FL — SIGNIFICANT CHANGE UP (ref 80–100)
MONOCYTES # BLD AUTO: 0.93 K/UL — HIGH (ref 0–0.9)
MONOCYTES NFR BLD AUTO: 5.7 % — SIGNIFICANT CHANGE UP (ref 2–14)
NEUTROPHILS # BLD AUTO: 14.15 K/UL — HIGH (ref 1.8–7.4)
NEUTROPHILS NFR BLD AUTO: 86.5 % — HIGH (ref 43–77)
NRBC # BLD: 0 /100 WBCS — SIGNIFICANT CHANGE UP (ref 0–0)
PCO2 BLDA: 42 MMHG — SIGNIFICANT CHANGE UP (ref 35–48)
PH BLDA: 7.54 — HIGH (ref 7.35–7.45)
PHOSPHATE SERPL-MCNC: 3.5 MG/DL — SIGNIFICANT CHANGE UP (ref 2.5–4.5)
PLATELET # BLD AUTO: 823 K/UL — HIGH (ref 150–400)
PO2 BLDA: 117 MMHG — HIGH (ref 83–108)
POTASSIUM SERPL-MCNC: 3.6 MMOL/L — SIGNIFICANT CHANGE UP (ref 3.5–5.3)
POTASSIUM SERPL-SCNC: 3.6 MMOL/L — SIGNIFICANT CHANGE UP (ref 3.5–5.3)
PROT SERPL-MCNC: 7.6 G/DL — SIGNIFICANT CHANGE UP (ref 6–8.3)
RBC # BLD: 4.18 M/UL — LOW (ref 4.2–5.8)
RBC # FLD: 14 % — SIGNIFICANT CHANGE UP (ref 10.3–14.5)
RH IG SCN BLD-IMP: POSITIVE — SIGNIFICANT CHANGE UP
SAO2 % BLDA: 99 % — SIGNIFICANT CHANGE UP (ref 95–100)
SARS-COV-2 RNA SPEC QL NAA+PROBE: NEGATIVE — SIGNIFICANT CHANGE UP
SODIUM SERPL-SCNC: 134 MMOL/L — LOW (ref 135–145)
SPECIMEN SOURCE: SIGNIFICANT CHANGE UP
WBC # BLD: 16.38 K/UL — HIGH (ref 3.8–10.5)
WBC # FLD AUTO: 16.38 K/UL — HIGH (ref 3.8–10.5)

## 2021-02-22 PROCEDURE — 99291 CRITICAL CARE FIRST HOUR: CPT

## 2021-02-22 PROCEDURE — 71045 X-RAY EXAM CHEST 1 VIEW: CPT | Mod: 26

## 2021-02-22 PROCEDURE — 99222 1ST HOSP IP/OBS MODERATE 55: CPT

## 2021-02-22 PROCEDURE — 99292 CRITICAL CARE ADDL 30 MIN: CPT

## 2021-02-22 RX ORDER — HEPARIN SODIUM 5000 [USP'U]/ML
1850 INJECTION INTRAVENOUS; SUBCUTANEOUS
Qty: 25000 | Refills: 0 | Status: DISCONTINUED | OUTPATIENT
Start: 2021-02-22 | End: 2021-02-24

## 2021-02-22 RX ORDER — MAGNESIUM SULFATE 500 MG/ML
1 VIAL (ML) INJECTION ONCE
Refills: 0 | Status: COMPLETED | OUTPATIENT
Start: 2021-02-22 | End: 2021-02-22

## 2021-02-22 RX ORDER — POTASSIUM CHLORIDE 20 MEQ
40 PACKET (EA) ORAL ONCE
Refills: 0 | Status: COMPLETED | OUTPATIENT
Start: 2021-02-22 | End: 2021-02-22

## 2021-02-22 RX ORDER — ACETAZOLAMIDE 250 MG/1
250 TABLET ORAL EVERY 12 HOURS
Refills: 0 | Status: COMPLETED | OUTPATIENT
Start: 2021-02-22 | End: 2021-02-23

## 2021-02-22 RX ADMIN — Medication 200 MILLIGRAM(S): at 09:00

## 2021-02-22 RX ADMIN — PIPERACILLIN AND TAZOBACTAM 200 GRAM(S): 4; .5 INJECTION, POWDER, LYOPHILIZED, FOR SOLUTION INTRAVENOUS at 06:12

## 2021-02-22 RX ADMIN — Medication 200 MILLIGRAM(S): at 14:25

## 2021-02-22 RX ADMIN — Medication 650 MILLIGRAM(S): at 21:39

## 2021-02-22 RX ADMIN — HEPARIN SODIUM 20 UNIT(S)/HR: 5000 INJECTION INTRAVENOUS; SUBCUTANEOUS at 11:17

## 2021-02-22 RX ADMIN — HEPARIN SODIUM 18.5 UNIT(S)/HR: 5000 INJECTION INTRAVENOUS; SUBCUTANEOUS at 00:16

## 2021-02-22 RX ADMIN — Medication 200 MILLIGRAM(S): at 21:47

## 2021-02-22 RX ADMIN — TICAGRELOR 90 MILLIGRAM(S): 90 TABLET ORAL at 21:47

## 2021-02-22 RX ADMIN — Medication 81 MILLIGRAM(S): at 06:12

## 2021-02-22 RX ADMIN — CHLORHEXIDINE GLUCONATE 1 APPLICATION(S): 213 SOLUTION TOPICAL at 06:12

## 2021-02-22 RX ADMIN — TICAGRELOR 90 MILLIGRAM(S): 90 TABLET ORAL at 09:12

## 2021-02-22 RX ADMIN — ACETAZOLAMIDE 105 MILLIGRAM(S): 250 TABLET ORAL at 13:20

## 2021-02-22 RX ADMIN — Medication 100 GRAM(S): at 09:00

## 2021-02-22 RX ADMIN — Medication 40 MILLIEQUIVALENT(S): at 09:11

## 2021-02-22 RX ADMIN — Medication 650 MILLIGRAM(S): at 14:25

## 2021-02-22 RX ADMIN — PIPERACILLIN AND TAZOBACTAM 200 GRAM(S): 4; .5 INJECTION, POWDER, LYOPHILIZED, FOR SOLUTION INTRAVENOUS at 18:00

## 2021-02-22 RX ADMIN — BUMETANIDE 2 MILLIGRAM(S): 0.25 INJECTION INTRAMUSCULAR; INTRAVENOUS at 09:00

## 2021-02-22 RX ADMIN — PIPERACILLIN AND TAZOBACTAM 200 GRAM(S): 4; .5 INJECTION, POWDER, LYOPHILIZED, FOR SOLUTION INTRAVENOUS at 21:30

## 2021-02-22 RX ADMIN — Medication 650 MILLIGRAM(S): at 09:00

## 2021-02-22 RX ADMIN — Medication 5 MILLIGRAM(S): at 21:46

## 2021-02-22 RX ADMIN — HEPARIN SODIUM 20 UNIT(S)/HR: 5000 INJECTION INTRAVENOUS; SUBCUTANEOUS at 09:00

## 2021-02-22 RX ADMIN — PIPERACILLIN AND TAZOBACTAM 200 GRAM(S): 4; .5 INJECTION, POWDER, LYOPHILIZED, FOR SOLUTION INTRAVENOUS at 11:17

## 2021-02-22 RX ADMIN — ATORVASTATIN CALCIUM 80 MILLIGRAM(S): 80 TABLET, FILM COATED ORAL at 21:46

## 2021-02-22 NOTE — CONSULT NOTE ADULT - ATTENDING COMMENTS
I have reviewed the medical record, including laboratory and radiographic studies, and discussed the plan with Dr. Lyons, the ID Resident.  Exam per Dr. Lyons/  Agree with above. Please recall if further ID input is desired – ID Team 1.

## 2021-02-22 NOTE — PROGRESS NOTE ADULT - ATTENDING COMMENTS
Pt is a 61 y/o man c sciatica, arthritis, psoriasis who p/w chest pain and dyspnea found to be hypoxic with severe pulmonary edema.  Presentation c/w ACS (late presenting STEMI?) with also SIRS.    Pt lethargic on exam today    TTE: EF 25%, apical severe hypokinesis    101, 83 -104/70-90, , 98%  HFNC 50/40    Mathieu 0.67  BNP 9232  CRP 31      WBC 22 --> 16  Hgb 12  Plt 727 --> 823    Na 134  Cr 0.92    CXR: much improved, less pulm edema    - pt with ACS and acute systolic CHF, ant MI  - agree to cont asa, brillanta & heparin in pt  - pt for cath but when infectious etiology sorted out  - cont lipitor  - hold ace/b-blocker for now  - pt appeared quite lethargic today, ordered stat ABG and pt alkalemic  - started diamox in for alkalemia  - pt with lekocytosis, thrombocytosis with increased inflammatory markers  - pt COVID -ve * >5  - cont abx for CAP  - cont HF NC for acute hypoxemic resp failure

## 2021-02-22 NOTE — PROGRESS NOTE ADULT - ASSESSMENT
Patient is a 59yo M PMH chronic low back pain with sciatica, arthritis, psoriasis presenting with 1.5 weeks of shortness of breath and chest pain. Found to be hypoxic and have significant pulmonary edema with new onset HF (bedside echo EF40%), b/l lung opacities with concern for PNA (pt meeting 3/4 SIRS criteria) and late presenting STEMI (trops 0.67, EKG with septal q waves and ST elevations). In the ED, pt was Aspirin/Plavix loaded and started on a Heparin gtt. Patient will be admitted to the CCU for further management.     Neuro  AAOx3     Cardiovascular   #ACS   Presenting with diffuse chest pain. Trops 0.67 (repeat 0.52). EKG with septal q waves and ST elevation c/f late presenting STEMI. No known CAD. S/p Aspirin 325mg, Plavix 600mg, Heparin bolus. TTE  this admission w/ Severely reduced left ventricular systolic function and regional wall motion abnormalities consistent with ischemic heart disease. LV EF 24%.  -s/p hep bolus. ptt subtherapeutic hep increased from 8-10 (goal 53-73)  PLAN  -c/w Aspirin 81mg  -dc Plavix 75mg, plan for Brilinta 180mg tonight at 9pm and 90m g bid starting 2/22 AM  -c/w Atorvastatin 80mg  -c/w heparin gtt  -f/u q6 PTT (goal 53-73)  -Plan for cath once medically optimized    #New onset CHF   Presenting with SOB. Pt found to be hypoxic and CXR showing fluid overload. BNP 9234. S/p Lasix 60mg in ED. Likely in setting of ACS. Pt not responding appropriately to 60 IV Lasix x 2 requiring 5mg metolazone, 2mg Bumex STAT then 1mg/hr Bumex infusion.   PLAN  -bumex gtt dc'd, start bumex 2mg IV bid  -Currently on HFNC, BIPAP at bedside  -f/u q6 CMP, replete lytes and monitor LFTs to assess for organ perfusion    Pulmonary   #Acute hypoxic respiratory failure   On admission hypoxic to 87%. Improvement to 93% on 3L. Likely 2/2 PNA (COVID vs bacterial) and fluid overloaded in setting of ACS w/ c/f for new onset HF. S/p Solumedrol 125mg, Duoneb x 1.   -Supplemental oxygen prn. Pt w. inc O2 NC increased from 3 to 6L sating in low 90's, w/ increased WOB requiring Bumex drip.  -S/p Solumedrol 125mg   PLAN  -Duonebs q6 prn  -Management for PNA below  -c/w Bumex 2mg IV bid as above  -Currently on HFNC, BIPAP at bedside  -f/u q6 CMP, replete lytes and monitor LFTs to assess for organ perfusion    #PNA, CAP vs COVID  Management as below     ID  #Sepsis 2/2 CAP   Pt meeting 3/4 SIRS criteria (tachycardic, tachypneic, leukocytosis). Was started on CAP tx at urgent care. CXR showing pulmonary edema with suspected b/l opacities. Procalcitonin 3.36. s/p Azithromycin 500mg and Ceftriaxone 1g in ED. Pt initially dc'd abx due to low suspicion for infection w/ SIRS + leukocytosis presumed to be 2/2 to CHF exacerbation.  -Pt spiked new fever of 103.6 rectal, tachycardic to 131 and tachypneic to 26. Pt restarted on vanc + zosyn. w/ source still likely to be PNA  -pt w/ increasing white count   -UA negative, Urine strep and legi negative    -Patient also COVID PCR neg x3 but positive for COVID antibodies. Patient w/ high fevers, increased WBC however hemodynamically stable s/p bumex gtt which increases suspicion for COVID PNA vs less likely sepsis 2/2 bacterial infection given patient maintaining pressures i/s/o diuresis (would expect patient to go into septic shock if bacterial PNA)  PLAN  -UA (2/29) neg  -BCx (2/20 AM and PM) NGTD  -MRSA swab neg -> vanc dc'd  -Lactate normalized  -C/w zosyn 4.5g q6h x7 days (2/20-2/26)  -F/u sputum cx  -Will consider deescalating or discontinuing ABX if no culture data returns  -Transfer to 3WO COVID MICU under CCU for high suspicion for COVID    GI  NABILA    Renal   NABILA    Endo  #Hyperglycemia   Glucose 124 on admission. No known h/o DM or Pre-DM   -HgbA1c wnl  PLAN  -c/w ISS     Heme   #Anemia   Found to have an Hgb of 11.6. Per HIE labs, baseline Hgb is 15.   -iron studies c/w anemia of chronic disease  PLAN  -in setting of sepsis will hold iron tx for now   -Maintain active T&S    #Thrombocytosis  -Plts 593, likely reactive in setting of ACS and PNA although per HIE labs Plts 412 in 1/2020.   PLAN  -Monitor CBC     Derm   #Psoriasis   Not on any medications     MSK   #Arthritis   -Tylenol 650mg prn     Allergy  #Seasonal allergies   -C/w home Claritin     F: none  E: replete K <4, Mg <2  N: DASH/TLC, fluid restriction    GI Ppx: None   DVT Ppx: Hep gtt  Code status: FULL   Dispo: CCU    Patient is a 59yo M PMH chronic low back pain with sciatica, arthritis, psoriasis presenting with 1.5 weeks of shortness of breath and chest pain. Found to be hypoxic and have significant pulmonary edema with new onset HF (bedside echo EF40%), b/l lung opacities with concern for PNA (pt meeting 3/4 SIRS criteria) and late presenting STEMI (trops 0.67, EKG with septal q waves and ST elevations). In the ED, pt was Aspirin/Plavix loaded and started on a Heparin gtt. Patient will be admitted to the CCU for further management.     Neuro  AAOx3     Cardiovascular   #ACS   Presenting with diffuse chest pain. Trops 0.67 (repeat 0.52). EKG with septal q waves and ST elevation c/f late presenting STEMI. No known CAD. S/p Aspirin 325mg, Plavix 600mg, Heparin bolus. TTE  this admission w/ Severely reduced left ventricular systolic function and regional wall motion abnormalities consistent with ischemic heart disease. LV EF 24%.  -s/p hep bolus. ptt subtherapeutic hep increased from 18.5 to 20 (goal 53-73)  PLAN  -c/w Aspirin 81mg  -dc Plavix 75mg, plan for Brilinta 180mg tonight at 9pm and 90m g bid starting 2/22 AM  -c/w Atorvastatin 80mg  -c/w heparin gtt  -f/u q6 PTT (goal 53-73)  -Plan for cath once medically optimized    #New onset CHF   Presenting with SOB. Pt found to be hypoxic and CXR showing fluid overload. BNP 9234. S/p Lasix 60mg in ED. Likely in setting of ACS. Pt not responding appropriately to 60 IV Lasix x 2 requiring 5mg metolazone, 2mg Bumex STAT then 1mg/hr Bumex infusion.   -bumex dc'd (2/22) pt w/ improved volume status. AM of 2/22 pt found to be very lethargic. ABG showed pH of 7.54 Bicarb of 35 from 26 on admission.  PLAN  -cont to hold further diuresis  -c/w acetazolamide 250mg IVPB x2 w/ repeat ABG  -Currently on HFNC, BIPAP at bedside  -f/u CMP, replete lytes and monitor LFTs to assess for organ perfusion    Pulmonary   #Acute hypoxic respiratory failure   On admission hypoxic to 87%. Improvement to 93% on 3L. Likely 2/2 PNA (COVID vs bacterial) and fluid overloaded in setting of ACS w/ c/f for new onset HF. S/p Solumedrol 125mg, Duoneb x 1.   -Supplemental oxygen prn. Pt w. inc O2 NC increased from 3 to 6L sating in low 90's, w/ increased WOB requiring Bumex drip.  -S/p Solumedrol 125mg   -bumex dc'd (2/22)  PLAN  -Duonebs q6 prn  -Management for PNA below  -Currently on HFNC, BIPAP at bedside  -f/u CMP, replete lytes and monitor LFTs to assess for organ perfusion    #PNA, CAP vs COVID  Management as below     ID  #Sepsis 2/2 CAP   Pt meeting 3/4 SIRS criteria (tachycardic, tachypneic, leukocytosis). Was started on CAP tx at urgent care. CXR showing pulmonary edema with suspected b/l opacities. Procalcitonin 3.36. s/p Azithromycin 500mg and Ceftriaxone 1g in ED. Pt initially dc'd abx due to low suspicion for infection w/ SIRS + leukocytosis presumed to be 2/2 to CHF exacerbation.  -Pt spiked new fever of 103.6 rectal, tachycardic to 131 and tachypneic to 26. Pt restarted on vanc + zosyn. w/ source still likely to be PNA  -pt w/ increasing white count   -UA negative, Urine strep and legi negative    -Patient also COVID PCR neg x3 but positive for COVID antibodies. Patient w/ high fevers, increased WBC however hemodynamically stable s/p bumex gtt which increases suspicion for COVID PNA vs less likely sepsis 2/2 bacterial infection given patient maintaining pressures i/s/o diuresis (would expect patient to go into septic shock if bacterial PNA)  PLAN  -UA (2/29) neg  -BCx (2/20 AM and PM) NGTD  -MRSA swab neg -> vanc dc'd  -Lactate normalized  -C/w zosyn 4.5g q6h x7 days (2/20-2/26)  -F/u sputum cx: NGTD  -Will consider deescalating or discontinuing ABX if no culture data returns  -f/u ID + epidemiology for pt to be transferred back to non-COVID CCU    GI  NABILA    Renal   #Metabolic alkalosis   pt w/ increased lethargy noted on AM of 2/22. ABG at the time showed pH of 7.53 and Bicarb of 35 from 26 on admission, likely 2/2 contraction alkalosis from aggressive diuresis  PLAN  -hold bumex -  dc'd (2/22)  -acetazolamide 250mg IVPB x2 w/ repeat ABG   -monitor mental status    Endo  #Hyperglycemia   Glucose 124 on admission. No known h/o DM or Pre-DM   -HgbA1c wnl  PLAN  -c/w ISS     Heme   #Anemia   Found to have an Hgb of 11.6. Per HIE labs, baseline Hgb is 15.   -iron studies c/w anemia of chronic disease  PLAN  -in setting of sepsis will hold iron tx for now   -Maintain active T&S    #Thrombocytosis  -Plts 593, likely reactive in setting of ACS and PNA although per HIE labs Plts 412 in 1/2020.   PLAN  -Monitor CBC     Derm   #Psoriasis   Not on any medications     MSK   #Arthritis   -Tylenol 650mg prn     Allergy  #Seasonal allergies   -C/w home Claritin     F: none  E: replete K <4, Mg <2  N: DASH/TLC, fluid restriction    GI Ppx: None   DVT Ppx: Hep gtt  Code status: FULL   Dispo: CCU

## 2021-02-22 NOTE — CONSULT NOTE ADULT - ASSESSMENT
NOTE IN PROGRESS    60 M with PMHx chronic low back pain with sciatica, arthritis, psoriasis presenting with 1.5 weeks of shortness of breath and chest pain. Found to be hypoxic and have significant pulmonary edema with new onset HF (bedside echo EF40%), b/l lung opacities with concern for PNA (pt meeting 3/4 SIRS criteria) and late presenting STEMI (trops 0.67, EKG with septal q waves and ST elevations). In the ED, pt was Aspirin/Plavix loaded and started on a Heparin gtt. ID Consulted as formal COVID Consult given negative COVID PCRs.    #Sepsis 2/2 CAP, r/o COVID 19   Was started on CAP tx at urgent care (levaquin unknown how many days patient completed), on presentation 3/4 SIRS criteria (tachycardic, tachypneic, leukocytosis)  CXR showing pulmonary edema with suspected b/l opacities. On admission, WBC19, neutrophilic predominance, lymphopenia. CRP 27. Procalcitonin 3.36.  -s/p Azithromycin 500mg and Ceftriaxone 1g in ED. Pt initially dc'd abx due to low suspicion for infection w/ SIRS + leukocytosis presumed to be 2/2 to CHF exacerbation. Transitioned to Vanc (s/p 1 dose)/Zosyn  -Pt spiked new fever of 103.6 rectal (2/20), tachycardic to 131 and tachypneic to 26.  -UA negative, Urine strep and legionella negative    -COVID PCR neg x3, positive for COVID antibodies with COVID IgG antibody index 2.00  -UA (2/29) neg  -BCx (2/20 AM and PM) NGTD  -MRSA swab neg  -COVID Labs: Ferritin 780, D-Dimer 540, CRP Trend 27.96-> 31.99    Recommendations:  - currently on Zosyn (2/20 - ), pending sensitivities and speciation  - follow up sputum culture  - Disposition pending discussion with attending    ID Team 1 Consulted for COVID Consult    Pending discussion with Infectious Disease Attending Dr. Briones. Recommendations are considered final after attending attestation.      60 M with PMHx chronic low back pain with sciatica, arthritis, psoriasis presenting with 1.5 weeks of shortness of breath and chest pain. Found to be hypoxic and have significant pulmonary edema with new onset HF (bedside echo EF40%), b/l lung opacities with concern for PNA (pt meeting 3/4 SIRS criteria) and late presenting STEMI (trops 0.67, EKG with septal q waves and ST elevations). In the ED, pt was Aspirin/Plavix loaded and started on a Heparin gtt. ID Consulted as formal COVID Consult given negative COVID PCRs.    #Sepsis 2/2 CAP vs c/f COVID 19   Was started on CAP tx at urgent care (levaquin unknown how many days patient completed), on presentation 3/4 SIRS criteria (tachycardic, tachypneic, leukocytosis)  CXR showing pulmonary edema with suspected b/l opacities. On admission, WBC19, neutrophilic predominance, lymphopenia. CRP 27. Procalcitonin 3.36.  -s/p Azithromycin 500mg and Ceftriaxone 1g in ED. Pt initially dc'd abx due to low suspicion for infection w/ SIRS + leukocytosis presumed to be 2/2 to CHF exacerbation. Transitioned to Vanc (s/p 1 dose)/Zosyn  -Pt spiked new fever of 103.6 rectal (2/20), tachycardic to 131 and tachypneic to 26.  -UA negative, Urine strep and legionella negative    -COVID PCR neg x3, positive for COVID antibodies with COVID IgG antibody index 2.00  -UA (2/29) neg  -BCx (2/20 AM and PM) NGTD  -MRSA swab neg  -COVID Labs: Ferritin 780, D-Dimer 540, CRP Trend 27.96-> 31.99    Recommendations:  Given the high inflammatory markers, high oxygen requirements and mildly elevated COVID IgG antibodies (COVID IgG antibodies can develop within 7 days of infection), would remain concerned for possible COVID despite multiple negative COVID PCRs. Given high suspicion, would recommend continued care and monitoring on COVID unit    ID Team 1 Consulted for COVID Consult    Discussed with Infectious Disease Attending Dr. Briones. Recommendations are considered final after attending attestation.      60 M with PMHx chronic low back pain with sciatica, arthritis, psoriasis presenting with 1.5 weeks of shortness of breath and chest pain. Found to be hypoxic and have significant pulmonary edema with new onset HF (bedside echo EF40%), b/l lung opacities with concern for PNA (pt meeting 3/4 SIRS criteria) and late presenting STEMI (trops 0.67, EKG with septal q waves and ST elevations). In the ED, pt was Aspirin/Plavix loaded and started on a Heparin gtt. ID Consulted as formal COVID Consult given negative COVID PCRs.    #Sepsis 2/2 CAP vs c/f COVID 19   Was started on CAP tx at urgent care (levaquin unknown how many days patient completed), on presentation 3/4 SIRS criteria (tachycardic, tachypneic, leukocytosis)  CXR showing pulmonary edema with suspected b/l opacities. On admission, WBC19, neutrophilic predominance, lymphopenia. CRP 27. Procalcitonin 3.36.  -s/p Azithromycin 500mg and Ceftriaxone 1g in ED. Pt initially dc'd abx due to low suspicion for infection w/ SIRS + leukocytosis presumed to be 2/2 to CHF exacerbation. Transitioned to Vanc (s/p 1 dose)/Zosyn  -Pt spiked new fever of 103.6 rectal (2/20), tachycardic to 131 and tachypneic to 26.  -UA negative, Urine strep and legionella negative    -COVID PCR neg x3, positive for COVID antibodies with COVID IgG antibody index 2.00  -UA (2/29) neg  -BCx (2/20 AM and PM) NGTD  -MRSA swab neg  -COVID Labs: Ferritin 780, D-Dimer 540, CRP Trend 27.96-> 31.99    Recommendations:  Given the high inflammatory markers, high oxygen requirements and mildly elevated COVID IgG antibodies (COVID IgG antibodies can develop by ~7 days of infection), would remain concerned for possible COVID despite multiple negative COVID PCRs. Given high suspicion, would recommend continued care and monitoring on COVID unit    ID Team 1 Consulted for COVID Consult    Discussed with Infectious Disease Attending Dr. Briones. Recommendations are considered final after attending attestation.

## 2021-02-22 NOTE — PROGRESS NOTE ADULT - SUBJECTIVE AND OBJECTIVE BOX
OVERNIGHT EVENTS:    SUBJECTIVE / INTERVAL HPI: Patient seen and examined at bedside.       PHYSICAL EXAM:    General: WDWN  HEENT: NC/AT; PERRL, anicteric sclera; MMM  Neck: supple  Cardiovascular: +S1/S2, RRR  Respiratory: CTA B/L; no W/R/R  Gastrointestinal: soft, NT/ND; +BSx4  Extremities: WWP; no edema, clubbing or cyanosis  Vascular: 2+ radial, DP/PT pulses B/L  Neurological: AAOx3; no focal deficits  Psychiatric: pleasant mood and affect  Dermatologic: no appreciable wounds or damage to the skin    VITAL SIGNS:  Vital Signs Last 24 Hrs  T(C): 36.7 (2021 06:00), Max: 38.6 (2021 09:00)  T(F): 98.1 (2021 06:00), Max: 101.5 (2021 09:00)  HR: 113 (2021 08:00) (100 - 121)  BP: 101/65 (2021 08:00) (76/60 - 182/71)  BP(mean): 75 (2021 08:00) (65 - 102)  RR: 39 (2021 08:00) (15 - 50)  SpO2: 86% (2021 08:00) (86% - 100%)      MEDICATIONS:  MEDICATIONS  (STANDING):  aspirin enteric coated 81 milliGRAM(s) Oral every 24 hours  atorvastatin 80 milliGRAM(s) Oral at bedtime  buMETAnide Injectable 2 milliGRAM(s) IV Push every 12 hours  chlorhexidine 2% Cloths 1 Application(s) Topical <User Schedule>  heparin  Infusion 1850 Unit(s)/Hr (20 mL/Hr) IV Continuous <Continuous>  magnesium sulfate  IVPB 1 Gram(s) IV Intermittent once  melatonin 5 milliGRAM(s) Oral at bedtime  piperacillin/tazobactam IVPB.. 4.5 Gram(s) IV Intermittent every 6 hours  potassium chloride   Powder 40 milliEquivalent(s) Oral once  ticagrelor 90 milliGRAM(s) Oral every 12 hours    MEDICATIONS  (PRN):  acetaminophen   Tablet .. 650 milliGRAM(s) Oral every 6 hours PRN Temp greater or equal to 38C (100.4F), Mild Pain (1 - 3), Moderate Pain (4 - 6)  albuterol/ipratropium for Nebulization 3 milliLiter(s) Nebulizer every 6 hours PRN Shortness of Breath and/or Wheezing  guaiFENesin   Syrup  (Sugar-Free) 200 milliGRAM(s) Oral every 6 hours PRN Cough      ALLERGIES:  Allergies    No Known Allergies    Intolerances        LABS:                        12.3   16.38 )-----------( 823      ( 2021 07:06 )             37.5     02-    134<L>  |  85<L>  |  32<H>  ----------------------------<  143<H>  3.6   |  36<H>  |  0.92    Ca    9.4      2021 07:06  Phos  3.5       Mg     1.9         TPro  7.6  /  Alb  3.1<L>  /  TBili  0.5  /  DBili  x   /  AST  40  /  ALT  37  /  AlkPhos  155<H>  02-22    PT/INR - ( 2021 08:05 )   PT: 15.4 sec;   INR: 1.30          PTT - ( 2021 07:06 )  PTT:50.0 sec  Urinalysis Basic - ( 2021 16:39 )    Color: Yellow / Appearance: Clear / S.010 / pH: x  Gluc: x / Ketone: NEGATIVE  / Bili: Negative / Urobili: 0.2 E.U./dL   Blood: x / Protein: NEGATIVE mg/dL / Nitrite: NEGATIVE   Leuk Esterase: NEGATIVE / RBC: x / WBC x   Sq Epi: x / Non Sq Epi: x / Bacteria: x      CAPILLARY BLOOD GLUCOSE      POCT Blood Glucose.: 127 mg/dL (2021 17:10)      RADIOLOGY & ADDITIONAL TESTS: Reviewed. OVERNIGHT EVENTS: PTT 32 Hep drip increased from 18.5 to 20. Pt febrile to 101 o/n tx w/ tylenol. Output -1/1L o/n    SUBJECTIVE / INTERVAL HPI: Patient seen and examined at bedside. pt complaining of cough w/ sputum. Pt states cough associated w/ intermittent CP and SOB. denies CP or SOB at rest. pt endorsing chills. denies fever denies GI or  symptoms. denies HA, dizziness, n/v/d. denies LE edema/pain.       PHYSICAL EXAM:    General: Fatigued appearing, NAD, resting comfortably in bed, speaking in full sentences on HFNC  HEENT: NC/AT  Cardiovascular: tachycardic; +S1/S2, no r/m/g  Respiratory: b/l crackles; no W/R/R; no retractions or accessory muscle use; intermittently coughing on exam  Gastrointestinal: soft, NT/ND; +BSx4  Extremities: WWP; no edema, clubbing or cyanosis  Vascular: 2+ radial, DP/PT pulses B/L  Neurological: AAOx3; no focal deficits    VITAL SIGNS:  Vital Signs Last 24 Hrs  T(C): 36.7 (2021 06:00), Max: 38.6 (2021 09:00)  T(F): 98.1 (2021 06:00), Max: 101.5 (2021 09:00)  HR: 113 (2021 08:00) (100 - 121)  BP: 101/65 (2021 08:00) (76/60 - 182/71)  BP(mean): 75 (2021 08:00) (65 - 102)  RR: 39 (2021 08:00) (15 - 50)  SpO2: 86% (2021 08:00) (86% - 100%)      MEDICATIONS:  MEDICATIONS  (STANDING):  aspirin enteric coated 81 milliGRAM(s) Oral every 24 hours  atorvastatin 80 milliGRAM(s) Oral at bedtime  buMETAnide Injectable 2 milliGRAM(s) IV Push every 12 hours  chlorhexidine 2% Cloths 1 Application(s) Topical <User Schedule>  heparin  Infusion 1850 Unit(s)/Hr (20 mL/Hr) IV Continuous <Continuous>  magnesium sulfate  IVPB 1 Gram(s) IV Intermittent once  melatonin 5 milliGRAM(s) Oral at bedtime  piperacillin/tazobactam IVPB.. 4.5 Gram(s) IV Intermittent every 6 hours  potassium chloride   Powder 40 milliEquivalent(s) Oral once  ticagrelor 90 milliGRAM(s) Oral every 12 hours    MEDICATIONS  (PRN):  acetaminophen   Tablet .. 650 milliGRAM(s) Oral every 6 hours PRN Temp greater or equal to 38C (100.4F), Mild Pain (1 - 3), Moderate Pain (4 - 6)  albuterol/ipratropium for Nebulization 3 milliLiter(s) Nebulizer every 6 hours PRN Shortness of Breath and/or Wheezing  guaiFENesin   Syrup  (Sugar-Free) 200 milliGRAM(s) Oral every 6 hours PRN Cough      ALLERGIES:  Allergies    No Known Allergies    Intolerances        LABS:                        12.3   16.38 )-----------( 823      ( 2021 07:06 )             37.5         134<L>  |  85<L>  |  32<H>  ----------------------------<  143<H>  3.6   |  36<H>  |  0.92    Ca    9.4      2021 07:06  Phos  3.5       Mg     1.9         TPro  7.6  /  Alb  3.1<L>  /  TBili  0.5  /  DBili  x   /  AST  40  /  ALT  37  /  AlkPhos  155<H>      PT/INR - ( 2021 08:05 )   PT: 15.4 sec;   INR: 1.30          PTT - ( 2021 07:06 )  PTT:50.0 sec  Urinalysis Basic - ( 2021 16:39 )    Color: Yellow / Appearance: Clear / S.010 / pH: x  Gluc: x / Ketone: NEGATIVE  / Bili: Negative / Urobili: 0.2 E.U./dL   Blood: x / Protein: NEGATIVE mg/dL / Nitrite: NEGATIVE   Leuk Esterase: NEGATIVE / RBC: x / WBC x   Sq Epi: x / Non Sq Epi: x / Bacteria: x      CAPILLARY BLOOD GLUCOSE      POCT Blood Glucose.: 127 mg/dL (2021 17:10)      RADIOLOGY & ADDITIONAL TESTS: Reviewed. OVERNIGHT EVENTS: PTT 32 Hep drip increased from 18.5 to 20. Pt febrile to 101 o/n tx w/ tylenol. Output -1/1L o/n    SUBJECTIVE / INTERVAL HPI: Patient seen and examined at bedside. pt complaining of cough w/ sputum. Pt states cough associated w/ intermittent CP and SOB. denies CP or SOB at rest. pt endorsing chills. denies fever denies GI or  symptoms. denies HA, dizziness, n/v/d. denies LE edema/pain, hematochezia/melena, dysuria, hematuria, new onset weakness/numbness.      PHYSICAL EXAM:    General: Fatigued appearing, NAD, resting comfortably in bed, speaking in full sentences on HFNC  HEENT: NC/AT  Cardiovascular: tachycardic; +S1/S2, no r/m/g  Respiratory: b/l crackles; no W/R/R; no retractions or accessory muscle use; intermittently coughing on exam  Gastrointestinal: soft, NT/ND; +BSx4  Extremities: WWP; no edema, clubbing or cyanosis  Vascular: 2+ radial, DP/PT pulses B/L  Neurological: AAOx3; no focal deficits    VITAL SIGNS:  Vital Signs Last 24 Hrs  T(C): 36.7 (2021 06:00), Max: 38.6 (2021 09:00)  T(F): 98.1 (2021 06:00), Max: 101.5 (2021 09:00)  HR: 113 (2021 08:00) (100 - 121)  BP: 101/65 (2021 08:00) (76/60 - 182/71)  BP(mean): 75 (2021 08:00) (65 - 102)  RR: 39 (2021 08:00) (15 - 50)  SpO2: 86% (2021 08:00) (86% - 100%)      MEDICATIONS:  MEDICATIONS  (STANDING):  aspirin enteric coated 81 milliGRAM(s) Oral every 24 hours  atorvastatin 80 milliGRAM(s) Oral at bedtime  buMETAnide Injectable 2 milliGRAM(s) IV Push every 12 hours  chlorhexidine 2% Cloths 1 Application(s) Topical <User Schedule>  heparin  Infusion 1850 Unit(s)/Hr (20 mL/Hr) IV Continuous <Continuous>  magnesium sulfate  IVPB 1 Gram(s) IV Intermittent once  melatonin 5 milliGRAM(s) Oral at bedtime  piperacillin/tazobactam IVPB.. 4.5 Gram(s) IV Intermittent every 6 hours  potassium chloride   Powder 40 milliEquivalent(s) Oral once  ticagrelor 90 milliGRAM(s) Oral every 12 hours    MEDICATIONS  (PRN):  acetaminophen   Tablet .. 650 milliGRAM(s) Oral every 6 hours PRN Temp greater or equal to 38C (100.4F), Mild Pain (1 - 3), Moderate Pain (4 - 6)  albuterol/ipratropium for Nebulization 3 milliLiter(s) Nebulizer every 6 hours PRN Shortness of Breath and/or Wheezing  guaiFENesin   Syrup  (Sugar-Free) 200 milliGRAM(s) Oral every 6 hours PRN Cough      ALLERGIES:  Allergies    No Known Allergies    Intolerances        LABS:                        12.3   16.38 )-----------( 823      ( 2021 07:06 )             37.5     02-    134<L>  |  85<L>  |  32<H>  ----------------------------<  143<H>  3.6   |  36<H>  |  0.92    Ca    9.4      2021 07:06  Phos  3.5       Mg     1.9         TPro  7.6  /  Alb  3.1<L>  /  TBili  0.5  /  DBili  x   /  AST  40  /  ALT  37  /  AlkPhos  155<H>  -    PT/INR - ( 2021 08:05 )   PT: 15.4 sec;   INR: 1.30          PTT - ( 2021 07:06 )  PTT:50.0 sec  Urinalysis Basic - ( 2021 16:39 )    Color: Yellow / Appearance: Clear / S.010 / pH: x  Gluc: x / Ketone: NEGATIVE  / Bili: Negative / Urobili: 0.2 E.U./dL   Blood: x / Protein: NEGATIVE mg/dL / Nitrite: NEGATIVE   Leuk Esterase: NEGATIVE / RBC: x / WBC x   Sq Epi: x / Non Sq Epi: x / Bacteria: x      CAPILLARY BLOOD GLUCOSE      POCT Blood Glucose.: 127 mg/dL (2021 17:10)      RADIOLOGY & ADDITIONAL TESTS: Reviewed.     OVERNIGHT EVENTS: PTT 32 Hep drip increased from 18.5 to 20. Pt febrile to 101 o/n tx w/ tylenol. Output -1/1L o/n    SUBJECTIVE / INTERVAL HPI: Patient seen and examined at bedside. pt complaining of cough w/ sputum. Pt states cough associated w/ intermittent CP and SOB. denies CP or SOB at rest. pt endorsing chills. denies fever denies GI or  symptoms. denies HA, dizziness, n/v/d. denies LE edema/pain, hematochezia/melena, dysuria, hematuria, new onset weakness/numbness.      PHYSICAL EXAM:    General: Fatigued appearing, NAD, resting comfortably in bed, speaking in full sentences on HFNC  HEENT: NC/AT  Cardiovascular: tachycardic; +S1/S2, no r/m/g  Respiratory: b/l crackles; no W/R/R; no retractions or accessory muscle use; intermittently coughing on exam  Gastrointestinal: soft, NT/ND; +BSx4  Extremities: WWP; no edema, clubbing or cyanosis  Vascular: 2+ radial, DP/PT pulses B/L  Neurological: AAOx3; no focal deficits    VITAL SIGNS:  Vital Signs Last 24 Hrs  T(C): 36.7 (2021 06:00), Max: 38.6 (2021 09:00)  T(F): 98.1 (2021 06:00), Max: 101.5 (2021 09:00)  HR: 113 (2021 08:00) (100 - 121)  BP: 101/65 (2021 08:00) (76/60 - 182/71)  BP(mean): 75 (2021 08:00) (65 - 102)  RR: 39 (2021 08:00) (15 - 50)  SpO2: 86% (2021 08:00) (86% - 100%)      MEDICATIONS:  MEDICATIONS  (STANDING):  aspirin enteric coated 81 milliGRAM(s) Oral every 24 hours  atorvastatin 80 milliGRAM(s) Oral at bedtime  buMETAnide Injectable 2 milliGRAM(s) IV Push every 12 hours  chlorhexidine 2% Cloths 1 Application(s) Topical <User Schedule>  heparin  Infusion 1850 Unit(s)/Hr (20 mL/Hr) IV Continuous <Continuous>  magnesium sulfate  IVPB 1 Gram(s) IV Intermittent once  melatonin 5 milliGRAM(s) Oral at bedtime  piperacillin/tazobactam IVPB.. 4.5 Gram(s) IV Intermittent every 6 hours  potassium chloride   Powder 40 milliEquivalent(s) Oral once  ticagrelor 90 milliGRAM(s) Oral every 12 hours    MEDICATIONS  (PRN):  acetaminophen   Tablet .. 650 milliGRAM(s) Oral every 6 hours PRN Temp greater or equal to 38C (100.4F), Mild Pain (1 - 3), Moderate Pain (4 - 6)  albuterol/ipratropium for Nebulization 3 milliLiter(s) Nebulizer every 6 hours PRN Shortness of Breath and/or Wheezing  guaiFENesin   Syrup  (Sugar-Free) 200 milliGRAM(s) Oral every 6 hours PRN Cough      ALLERGIES:  Allergies    No Known Allergies    Intolerances        LABS:                        12.3   16.38 )-----------( 823      ( 2021 07:06 )             37.5     02-    134<L>  |  85<L>  |  32<H>  ----------------------------<  143<H>  3.6   |  36<H>  |  0.92    Ca    9.4      2021 07:06  Phos  3.5       Mg     1.9         TPro  7.6  /  Alb  3.1<L>  /  TBili  0.5  /  DBili  x   /  AST  40  /  ALT  37  /  AlkPhos  155<H>  -    PT/INR - ( 2021 08:05 )   PT: 15.4 sec;   INR: 1.30          PTT - ( 2021 07:06 )  PTT:50.0 sec  Urinalysis Basic - ( 2021 16:39 )    Color: Yellow / Appearance: Clear / S.010 / pH: x  Gluc: x / Ketone: NEGATIVE  / Bili: Negative / Urobili: 0.2 E.U./dL   Blood: x / Protein: NEGATIVE mg/dL / Nitrite: NEGATIVE   Leuk Esterase: NEGATIVE / RBC: x / WBC x   Sq Epi: x / Non Sq Epi: x / Bacteria: x      CAPILLARY BLOOD GLUCOSE      POCT Blood Glucose.: 127 mg/dL (2021 17:10)      RADIOLOGY & ADDITIONAL TESTS: Reviewed. OVERNIGHT EVENTS: PTT 32 Hep drip increased from 18.5 to 20. Pt febrile to 101 o/n tx w/ tylenol. Output -1/1L o/n    SUBJECTIVE / INTERVAL HPI: Patient seen and examined at bedside. pt complaining of cough w/ sputum improved from days prior0. Pt states cough associated w/ intermittent CP and SOB. denies CP or SOB at rest. pt endorsing chills. denies fever denies GI or  symptoms. denies HA, dizziness, n/v/d. denies LE edema/pain, hematochezia/melena, dysuria, hematuria, new onset weakness/numbness.      PHYSICAL EXAM:    General: Fatigued appearing, NAD, resting comfortably in bed, speaking in full sentences on HFNC  HEENT: NC/AT  Cardiovascular: tachycardic; +S1/S2, no r/m/g  Respiratory: b/l crackles; no W/R/R; no retractions or accessory muscle use; intermittently coughing on exam  Gastrointestinal: soft, NT/ND; +BSx4  Extremities: WWP; no edema, clubbing or cyanosis  Vascular: 2+ radial, DP/PT pulses B/L  Neurological: AAOx3; no focal deficits    VITAL SIGNS:  Vital Signs Last 24 Hrs  T(C): 36.7 (2021 06:00), Max: 38.6 (2021 09:00)  T(F): 98.1 (2021 06:00), Max: 101.5 (2021 09:00)  HR: 113 (2021 08:00) (100 - 121)  BP: 101/65 (2021 08:00) (76/60 - 182/71)  BP(mean): 75 (2021 08:00) (65 - 102)  RR: 39 (2021 08:00) (15 - 50)  SpO2: 86% (2021 08:00) (86% - 100%)      MEDICATIONS:  MEDICATIONS  (STANDING):  aspirin enteric coated 81 milliGRAM(s) Oral every 24 hours  atorvastatin 80 milliGRAM(s) Oral at bedtime  buMETAnide Injectable 2 milliGRAM(s) IV Push every 12 hours  chlorhexidine 2% Cloths 1 Application(s) Topical <User Schedule>  heparin  Infusion 1850 Unit(s)/Hr (20 mL/Hr) IV Continuous <Continuous>  magnesium sulfate  IVPB 1 Gram(s) IV Intermittent once  melatonin 5 milliGRAM(s) Oral at bedtime  piperacillin/tazobactam IVPB.. 4.5 Gram(s) IV Intermittent every 6 hours  potassium chloride   Powder 40 milliEquivalent(s) Oral once  ticagrelor 90 milliGRAM(s) Oral every 12 hours    MEDICATIONS  (PRN):  acetaminophen   Tablet .. 650 milliGRAM(s) Oral every 6 hours PRN Temp greater or equal to 38C (100.4F), Mild Pain (1 - 3), Moderate Pain (4 - 6)  albuterol/ipratropium for Nebulization 3 milliLiter(s) Nebulizer every 6 hours PRN Shortness of Breath and/or Wheezing  guaiFENesin   Syrup  (Sugar-Free) 200 milliGRAM(s) Oral every 6 hours PRN Cough      ALLERGIES:  Allergies    No Known Allergies    Intolerances        LABS:                        12.3   16.38 )-----------( 823      ( 2021 07:06 )             37.5     02-    134<L>  |  85<L>  |  32<H>  ----------------------------<  143<H>  3.6   |  36<H>  |  0.92    Ca    9.4      2021 07:06  Phos  3.5       Mg     1.9         TPro  7.6  /  Alb  3.1<L>  /  TBili  0.5  /  DBili  x   /  AST  40  /  ALT  37  /  AlkPhos  155<H>      PT/INR - ( 2021 08:05 )   PT: 15.4 sec;   INR: 1.30          PTT - ( 2021 07:06 )  PTT:50.0 sec  Urinalysis Basic - ( 2021 16:39 )    Color: Yellow / Appearance: Clear / S.010 / pH: x  Gluc: x / Ketone: NEGATIVE  / Bili: Negative / Urobili: 0.2 E.U./dL   Blood: x / Protein: NEGATIVE mg/dL / Nitrite: NEGATIVE   Leuk Esterase: NEGATIVE / RBC: x / WBC x   Sq Epi: x / Non Sq Epi: x / Bacteria: x      CAPILLARY BLOOD GLUCOSE      POCT Blood Glucose.: 127 mg/dL (2021 17:10)      RADIOLOGY & ADDITIONAL TESTS: Reviewed.

## 2021-02-22 NOTE — CONSULT NOTE ADULT - SUBJECTIVE AND OBJECTIVE BOX
Infectious Disease Consult Note    HPI:  Patient is a 59yo M PMH chronic low back pain with sciatica, arthritis, psoriasis presenting with 1.5 weeks of shortness of breath and chest pain. Pt reports the chest pain began first. It was diffuse, located at the right and left sides of his chest. Described the pain as a muscle spasm. Denies h/o chest pain or cardiac disease. Pain was all the time not worse with exercise or relieved by rest. Pt spoke with a dentist friend of his regarding his symptoms and he prescribed him a Z-Tuan. His symptoms continued to worsen so he went to Urgent Care this past Wednesday where a CXR was performed. He was tested for COVID which was negative and told he had PNA. He was given a prescription for Levaquin. Pt called EMS today as he continued to feel SOB. Patient reports he no longer has chest pain, only SOB. SOB is worse with laying flat and at night. Reports subjective fevers and cough productive of blood streaked sputum. Denies chills, HA, nausea, vomiting, abdominal pain, diarrhea, constipation, dysuria.    ED Course   VS: 97.1, 121, 128/73, 24, 87% RA->93% 3L   Labs: wbc 19, hgb 11.6, hct 36.2, plts 593, D-dimer 544, K+ 3.2, Mg 1.4, Glu 124, CRP 28, Ferritin 701, Procal 3.36, Trop 0.67,BNP 9234  Imaging: CXR- pulm edema, possible b/l opacities. EKG- septal Q waves and ST elevations in V2-V5  Received: Aspirin 325mg, Plavix 600mg, Lasix 60mg, Solumedrol 125mg, KCL 40meq, Duoneb x1, Heparin bolus and started on Heparin gtt    (2021 05:22)    ID Consulted as formal COVID consult    Interval Events:    Subjective:  Patient seen and evaluated at bedside.    ROS:  negative except as above    Allergies    No Known Allergies    Intolerances        ANTIBIOTICS/RELEVANT:  antimicrobials  piperacillin/tazobactam IVPB.. 4.5 Gram(s) IV Intermittent every 6 hours    immunologic:    OTHER:  acetaminophen   Tablet .. 650 milliGRAM(s) Oral every 6 hours PRN  acetaZOLAMIDE  IVPB 250 milliGRAM(s) IV Intermittent every 12 hours  albuterol/ipratropium for Nebulization 3 milliLiter(s) Nebulizer every 6 hours PRN  aspirin enteric coated 81 milliGRAM(s) Oral every 24 hours  atorvastatin 80 milliGRAM(s) Oral at bedtime  chlorhexidine 2% Cloths 1 Application(s) Topical <User Schedule>  guaiFENesin   Syrup  (Sugar-Free) 200 milliGRAM(s) Oral every 6 hours PRN  heparin  Infusion 1850 Unit(s)/Hr IV Continuous <Continuous>  melatonin 5 milliGRAM(s) Oral at bedtime  ticagrelor 90 milliGRAM(s) Oral every 12 hours      Objective:  Vital Signs Last 24 Hrs  T(C): 36.5 (2021 13:00), Max: 38.3 (2021 15:00)  T(F): 97.7 (2021 13:00), Max: 101 (2021 23:20)  HR: 100 (2021 13:00) (90 - 120)  BP: 89/61 (2021 13:00) (76/60 - 182/71)  BP(mean): 71 (2021 13:00) (65 - 102)  RR: 21 (2021 13:00) (12 - 50)  SpO2: 100% (2021 13:00) (86% - 100%)    PHYSICAL EXAM:  General: Fatigued appearing, NAD, resting comfortably in bed, speaking in full sentences on HFNC  HEENT: NC/AT  Cardiovascular: tachycardic; +S1/S2  Respiratory: b/l crackles; no W/R/R; no retractions or accessory muscle use; intermittently coughing on exam  Gastrointestinal: soft, NT/ND; +BSx4  Extremities: WWP; no edema, clubbing or cyanosis  Vascular: 2+ radial, DP/PT pulses B/L  Neurological: AAOx3; no focal deficits      LABS:                        12.3   16.38 )-----------( 823      ( 2021 07:06 )             37.5     02-22    134<L>  |  85<L>  |  32<H>  ----------------------------<  143<H>  3.6   |  36<H>  |  0.92    Ca    9.4      2021 07:06  Phos  3.5       Mg     1.9         TPro  7.6  /  Alb  3.1<L>  /  TBili  0.5  /  DBili  x   /  AST  40  /  ALT  37  /  AlkPhos  155<H>      PT/INR - ( 2021 08:05 )   PT: 15.4 sec;   INR: 1.30          PTT - ( 2021 07:06 )  PTT:50.0 sec  Urinalysis Basic - ( 2021 16:39 )    Color: Yellow / Appearance: Clear / S.010 / pH: x  Gluc: x / Ketone: NEGATIVE  / Bili: Negative / Urobili: 0.2 E.U./dL   Blood: x / Protein: NEGATIVE mg/dL / Nitrite: NEGATIVE   Leuk Esterase: NEGATIVE / RBC: x / WBC x   Sq Epi: x / Non Sq Epi: x / Bacteria: x        MICROBIOLOGY:            RECENT CULTURES:   @ 21:51  .Sputum Sputum  --  --  --  --  --   @ 17:16  .Blood Blood-Peripheral  --  --  --    No growth at 1 day.  --   @ 08:58  .Blood Blood-Peripheral  --  --  --    No growth at 2 days.  --      RADIOLOGY & ADDITIONAL STUDIES:

## 2021-02-23 ENCOUNTER — TRANSCRIPTION ENCOUNTER (OUTPATIENT)
Age: 61
End: 2021-02-23

## 2021-02-23 LAB
ALBUMIN SERPL ELPH-MCNC: 3.4 G/DL — SIGNIFICANT CHANGE UP (ref 3.3–5)
ALP SERPL-CCNC: 159 U/L — HIGH (ref 40–120)
ALT FLD-CCNC: 81 U/L — HIGH (ref 10–45)
ANION GAP SERPL CALC-SCNC: 17 MMOL/L — SIGNIFICANT CHANGE UP (ref 5–17)
APTT BLD: 69.7 SEC — HIGH (ref 27.5–35.5)
AST SERPL-CCNC: 69 U/L — HIGH (ref 10–40)
BASE EXCESS BLDA CALC-SCNC: 7 MMOL/L — HIGH (ref -2–3)
BASE EXCESS BLDA CALC-SCNC: 7.3 MMOL/L — HIGH (ref -2–3)
BASOPHILS # BLD AUTO: 0.07 K/UL — SIGNIFICANT CHANGE UP (ref 0–0.2)
BASOPHILS NFR BLD AUTO: 0.6 % — SIGNIFICANT CHANGE UP (ref 0–2)
BILIRUB SERPL-MCNC: 0.4 MG/DL — SIGNIFICANT CHANGE UP (ref 0.2–1.2)
BUN SERPL-MCNC: 37 MG/DL — HIGH (ref 7–23)
CALCIUM SERPL-MCNC: 10.2 MG/DL — SIGNIFICANT CHANGE UP (ref 8.4–10.5)
CHLORIDE SERPL-SCNC: 89 MMOL/L — LOW (ref 96–108)
CO2 SERPL-SCNC: 29 MMOL/L — SIGNIFICANT CHANGE UP (ref 22–31)
CREAT SERPL-MCNC: 1.01 MG/DL — SIGNIFICANT CHANGE UP (ref 0.5–1.3)
CRP SERPL-MCNC: 15.5 MG/DL — HIGH (ref 0–0.4)
CULTURE RESULTS: SIGNIFICANT CHANGE UP
D DIMER BLD IA.RAPID-MCNC: 464 NG/ML DDU — HIGH
EOSINOPHIL # BLD AUTO: 0.23 K/UL — SIGNIFICANT CHANGE UP (ref 0–0.5)
EOSINOPHIL NFR BLD AUTO: 1.9 % — SIGNIFICANT CHANGE UP (ref 0–6)
FERRITIN SERPL-MCNC: 1905 NG/ML — HIGH (ref 30–400)
GAS PNL BLDA: SIGNIFICANT CHANGE UP
GAS PNL BLDA: SIGNIFICANT CHANGE UP
GLUCOSE BLDC GLUCOMTR-MCNC: 143 MG/DL — HIGH (ref 70–99)
GLUCOSE SERPL-MCNC: 139 MG/DL — HIGH (ref 70–99)
HCO3 BLDA-SCNC: 31 MMOL/L — HIGH (ref 21–28)
HCO3 BLDA-SCNC: 32 MMOL/L — HIGH (ref 21–28)
HCT VFR BLD CALC: 37.4 % — LOW (ref 39–50)
HGB BLD-MCNC: 12.1 G/DL — LOW (ref 13–17)
HIV 1+2 AB+HIV1 P24 AG SERPL QL IA: SIGNIFICANT CHANGE UP
IMM GRANULOCYTES NFR BLD AUTO: 1.7 % — HIGH (ref 0–1.5)
INR BLD: 1.19 — HIGH (ref 0.88–1.16)
LYMPHOCYTES # BLD AUTO: 1.75 K/UL — SIGNIFICANT CHANGE UP (ref 1–3.3)
LYMPHOCYTES # BLD AUTO: 14.3 % — SIGNIFICANT CHANGE UP (ref 13–44)
MAGNESIUM SERPL-MCNC: 2.3 MG/DL — SIGNIFICANT CHANGE UP (ref 1.6–2.6)
MCHC RBC-ENTMCNC: 29.3 PG — SIGNIFICANT CHANGE UP (ref 27–34)
MCHC RBC-ENTMCNC: 32.4 GM/DL — SIGNIFICANT CHANGE UP (ref 32–36)
MCV RBC AUTO: 90.6 FL — SIGNIFICANT CHANGE UP (ref 80–100)
MONOCYTES # BLD AUTO: 0.87 K/UL — SIGNIFICANT CHANGE UP (ref 0–0.9)
MONOCYTES NFR BLD AUTO: 7.1 % — SIGNIFICANT CHANGE UP (ref 2–14)
NEUTROPHILS # BLD AUTO: 9.12 K/UL — HIGH (ref 1.8–7.4)
NEUTROPHILS NFR BLD AUTO: 74.4 % — SIGNIFICANT CHANGE UP (ref 43–77)
NRBC # BLD: 0 /100 WBCS — SIGNIFICANT CHANGE UP (ref 0–0)
PCO2 BLDA: 43 MMHG — SIGNIFICANT CHANGE UP (ref 35–48)
PCO2 BLDA: 47 MMHG — SIGNIFICANT CHANGE UP (ref 35–48)
PH BLDA: 7.46 — HIGH (ref 7.35–7.45)
PH BLDA: 7.48 — HIGH (ref 7.35–7.45)
PHOSPHATE SERPL-MCNC: 4.3 MG/DL — SIGNIFICANT CHANGE UP (ref 2.5–4.5)
PLATELET # BLD AUTO: 912 K/UL — HIGH (ref 150–400)
PO2 BLDA: 125 MMHG — HIGH (ref 83–108)
PO2 BLDA: 157 MMHG — HIGH (ref 83–108)
POTASSIUM SERPL-MCNC: 3.7 MMOL/L — SIGNIFICANT CHANGE UP (ref 3.5–5.3)
POTASSIUM SERPL-SCNC: 3.7 MMOL/L — SIGNIFICANT CHANGE UP (ref 3.5–5.3)
PROT SERPL-MCNC: 7.7 G/DL — SIGNIFICANT CHANGE UP (ref 6–8.3)
PROTHROM AB SERPL-ACNC: 14.2 SEC — HIGH (ref 10.6–13.6)
RBC # BLD: 4.13 M/UL — LOW (ref 4.2–5.8)
RBC # FLD: 13.6 % — SIGNIFICANT CHANGE UP (ref 10.3–14.5)
SAO2 % BLDA: 99 % — SIGNIFICANT CHANGE UP (ref 95–100)
SAO2 % BLDA: 99 % — SIGNIFICANT CHANGE UP (ref 95–100)
SODIUM SERPL-SCNC: 135 MMOL/L — SIGNIFICANT CHANGE UP (ref 135–145)
SPECIMEN SOURCE: SIGNIFICANT CHANGE UP
WBC # BLD: 12.25 K/UL — HIGH (ref 3.8–10.5)
WBC # FLD AUTO: 12.25 K/UL — HIGH (ref 3.8–10.5)

## 2021-02-23 PROCEDURE — 93460 R&L HRT ART/VENTRICLE ANGIO: CPT | Mod: 26

## 2021-02-23 PROCEDURE — 99292 CRITICAL CARE ADDL 30 MIN: CPT

## 2021-02-23 PROCEDURE — 71045 X-RAY EXAM CHEST 1 VIEW: CPT | Mod: 26

## 2021-02-23 PROCEDURE — 99291 CRITICAL CARE FIRST HOUR: CPT

## 2021-02-23 RX ORDER — SODIUM CHLORIDE 9 MG/ML
500 INJECTION INTRAMUSCULAR; INTRAVENOUS; SUBCUTANEOUS
Refills: 0 | Status: DISCONTINUED | OUTPATIENT
Start: 2021-02-23 | End: 2021-02-23

## 2021-02-23 RX ORDER — ACETAZOLAMIDE 250 MG/1
250 TABLET ORAL EVERY 12 HOURS
Refills: 0 | Status: DISCONTINUED | OUTPATIENT
Start: 2021-02-23 | End: 2021-02-23

## 2021-02-23 RX ORDER — POTASSIUM CHLORIDE 20 MEQ
40 PACKET (EA) ORAL ONCE
Refills: 0 | Status: COMPLETED | OUTPATIENT
Start: 2021-02-23 | End: 2021-02-23

## 2021-02-23 RX ORDER — PIPERACILLIN AND TAZOBACTAM 4; .5 G/20ML; G/20ML
4.5 INJECTION, POWDER, LYOPHILIZED, FOR SOLUTION INTRAVENOUS EVERY 6 HOURS
Refills: 0 | Status: COMPLETED | OUTPATIENT
Start: 2021-02-23 | End: 2021-02-27

## 2021-02-23 RX ORDER — SODIUM CHLORIDE 9 MG/ML
500 INJECTION INTRAMUSCULAR; INTRAVENOUS; SUBCUTANEOUS ONCE
Refills: 0 | Status: DISCONTINUED | OUTPATIENT
Start: 2021-02-23 | End: 2021-02-23

## 2021-02-23 RX ORDER — SODIUM CHLORIDE 9 MG/ML
1000 INJECTION INTRAMUSCULAR; INTRAVENOUS; SUBCUTANEOUS
Refills: 0 | Status: DISCONTINUED | OUTPATIENT
Start: 2021-02-23 | End: 2021-02-24

## 2021-02-23 RX ADMIN — SODIUM CHLORIDE 100 MILLILITER(S): 9 INJECTION INTRAMUSCULAR; INTRAVENOUS; SUBCUTANEOUS at 19:46

## 2021-02-23 RX ADMIN — Medication 81 MILLIGRAM(S): at 05:00

## 2021-02-23 RX ADMIN — PIPERACILLIN AND TAZOBACTAM 200 GRAM(S): 4; .5 INJECTION, POWDER, LYOPHILIZED, FOR SOLUTION INTRAVENOUS at 10:17

## 2021-02-23 RX ADMIN — ATORVASTATIN CALCIUM 80 MILLIGRAM(S): 80 TABLET, FILM COATED ORAL at 22:01

## 2021-02-23 RX ADMIN — Medication 40 MILLIEQUIVALENT(S): at 10:17

## 2021-02-23 RX ADMIN — CHLORHEXIDINE GLUCONATE 1 APPLICATION(S): 213 SOLUTION TOPICAL at 05:01

## 2021-02-23 RX ADMIN — ACETAZOLAMIDE 105 MILLIGRAM(S): 250 TABLET ORAL at 05:00

## 2021-02-23 RX ADMIN — Medication 200 MILLIGRAM(S): at 15:26

## 2021-02-23 RX ADMIN — Medication 200 MILLIGRAM(S): at 22:52

## 2021-02-23 RX ADMIN — PIPERACILLIN AND TAZOBACTAM 200 GRAM(S): 4; .5 INJECTION, POWDER, LYOPHILIZED, FOR SOLUTION INTRAVENOUS at 22:01

## 2021-02-23 RX ADMIN — TICAGRELOR 90 MILLIGRAM(S): 90 TABLET ORAL at 21:59

## 2021-02-23 RX ADMIN — PIPERACILLIN AND TAZOBACTAM 200 GRAM(S): 4; .5 INJECTION, POWDER, LYOPHILIZED, FOR SOLUTION INTRAVENOUS at 05:01

## 2021-02-23 RX ADMIN — PIPERACILLIN AND TAZOBACTAM 200 GRAM(S): 4; .5 INJECTION, POWDER, LYOPHILIZED, FOR SOLUTION INTRAVENOUS at 15:25

## 2021-02-23 RX ADMIN — Medication 200 MILLIGRAM(S): at 05:02

## 2021-02-23 RX ADMIN — SODIUM CHLORIDE 100 MILLILITER(S): 9 INJECTION INTRAMUSCULAR; INTRAVENOUS; SUBCUTANEOUS at 10:17

## 2021-02-23 RX ADMIN — Medication 5 MILLIGRAM(S): at 22:01

## 2021-02-23 RX ADMIN — TICAGRELOR 90 MILLIGRAM(S): 90 TABLET ORAL at 10:17

## 2021-02-23 RX ADMIN — Medication 650 MILLIGRAM(S): at 05:01

## 2021-02-23 RX ADMIN — Medication 650 MILLIGRAM(S): at 22:52

## 2021-02-23 NOTE — DISCHARGE NOTE PROVIDER - PROVIDER TOKENS
PROVIDER:[TOKEN:[[id number]],FOLLOWUP:[2 weeks],ESTABLISHEDPATIENT:[T]] PROVIDER:[TOKEN:[[id number]],FOLLOWUP:[2 weeks],ESTABLISHEDPATIENT:[T]],PROVIDER:[TOKEN:[[id number]],SCHEDULEDAPPT:[03/04/2021],SCHEDULEDAPPTTIME:[01:00 PM]] PROVIDER:[TOKEN:[[id number]],FOLLOWUP:[2 weeks],ESTABLISHEDPATIENT:[T]],PROVIDER:[TOKEN:[[id number]],SCHEDULEDAPPT:[03/04/2021],SCHEDULEDAPPTTIME:[01:00 PM]],PROVIDER:[TOKEN:[[id number]]]

## 2021-02-23 NOTE — DISCHARGE NOTE PROVIDER - NSDCCPCAREPLAN_GEN_ALL_CORE_FT
PRINCIPAL DISCHARGE DIAGNOSIS  Diagnosis: ST elevation (STEMI) myocardial infarction  Assessment and Plan of Treatment:       SECONDARY DISCHARGE DIAGNOSES  Diagnosis: Acute respiratory failure with hypoxia  Assessment and Plan of Treatment:     Diagnosis: Congestive heart failure (CHF)  Assessment and Plan of Treatment: Weigh yourself daily.  If you gain 3lbs in 3 days, or 5lbs in a week call your Health Care Provider.  Eat a low sodium diet (less than 2 grams of sodium a day).  Call your Health Care Provider if you have any swelling or increased swelling in your feet, ankles, and/or stomach.  Take all of your medication as directed.  If you become dizzy call your Health Care Provider.  If you experience chest pain or shortness of breath, call an ambulance and come to the emergency department. Please continue to take ___    Diagnosis: Sepsis, unspecified organism  Assessment and Plan of Treatment:      PRINCIPAL DISCHARGE DIAGNOSIS  Diagnosis: Congestive heart failure (CHF)  Assessment and Plan of Treatment: Heart failure, sometimes known as congestive heart failure, occurs when your heart muscle doesn't pump blood as well as it should. Certain conditions, such as narrowed arteries in your heart (coronary artery disease) or high blood pressure, gradually leave your heart too weak or stiff to fill and pump efficiently.  Not all conditions that lead to heart failure can be reversed, but treatments can improve the signs and symptoms of heart failure and help you live longer. Lifestyle changes — such as exercising, reducing sodium in your diet, managing stress and losing weight — can improve your quality of life.  One way to prevent heart failure is to prevent and control conditions that cause heart failure, such as coronary artery disease, high blood pressure, diabetes or obesity.  Although these signs and symptoms may be due to heart failure, there are many other possible causes, including other life-threatening heart and lung conditions. Don't try to diagnose yourself. Call 911 or your local emergency number for immediate help. Emergency room doctors will try to stabilize your condition and determine if your symptoms are due to heart failure or something else.  Weigh yourself daily.  If you gain 3lbs in 3 days, or 5lbs in a week call your Health Care Provider.  Eat a low sodium diet (less than 2 grams of sodium a day).  Call your Health Care Provider if you have any swelling or increased swelling in your feet, ankles, and/or stomach.  Take all of your medication as directed.  If you become dizzy call your Health Care Provider.  If you experience chest pain or shortness of breath, call an ambulance and come to the emergency department. Please continue to take  -Valsartan 40mg daily  -Metoprolol *************** daily   You appointment with your cardiologist Dr Mary Plaza is scheduled for   3/4/21 @ 1pm  [address]  [phone]  also please call to schedule an appointment with your PCP to be seen within the next 1-2 weeks.  you can either schedule an appointme      SECONDARY DISCHARGE DIAGNOSES  Diagnosis: ST elevation MI (STEMI)  Assessment and Plan of Treatment:     Diagnosis: Sepsis, unspecified organism  Assessment and Plan of Treatment:     Diagnosis: Acute respiratory failure with hypoxia  Assessment and Plan of Treatment:      PRINCIPAL DISCHARGE DIAGNOSIS  Diagnosis: Congestive heart failure (CHF)  Assessment and Plan of Treatment: Heart failure, sometimes known as congestive heart failure, occurs when your heart muscle doesn't pump blood as well as it should. Certain conditions, such as narrowed arteries in your heart (coronary artery disease) or high blood pressure, gradually leave your heart too weak or stiff to fill and pump efficiently.  Not all conditions that lead to heart failure can be reversed, but treatments can improve the signs and symptoms of heart failure and help you live longer. Lifestyle changes — such as exercising, reducing sodium in your diet, managing stress and losing weight — can improve your quality of life.  One way to prevent heart failure is to prevent and control conditions that cause heart failure, such as coronary artery disease, high blood pressure, diabetes or obesity.  Although these signs and symptoms may be due to heart failure, there are many other possible causes, including other life-threatening heart and lung conditions. Don't try to diagnose yourself. Call 911 or your local emergency number for immediate help. Emergency room doctors will try to stabilize your condition and determine if your symptoms are due to heart failure or something else.  Weigh yourself daily.  If you gain 3lbs in 3 days, or 5lbs in a week call your Health Care Provider.  Eat a low sodium diet (less than 2 grams of sodium a day).  Call your Health Care Provider if you have any swelling or increased swelling in your feet, ankles, and/or stomach.  Take all of your medication as directed.  If you become dizzy call your Health Care Provider.  If you experience chest pain or shortness of breath, call an ambulance and come to the emergency department. Please continue to take  -Valsartan 40mg daily  -Metoprolol 25mg daily   You appointment with your cardiologist Dr Mary Plaza is scheduled for   3/4/21 @ 1pm  [address]  [phone]  also please call to schedule an appointment with your PCP to be seen within the next 1-2 weeks.        SECONDARY DISCHARGE DIAGNOSES  Diagnosis: Cough with exposure to COVID-19 virus  Assessment and Plan of Treatment: During your admission you were thought to be high suspician for the novel coronavirus (COVID-19). Upon discharge, you must self-quarantine UNTIL 3/1, or until the Department of Health contacts you. Please wear a face mask if you are around other individuals. Try to avoid contact with house members, family, and friends for the duration of this quarantine. Please follow up with your primary care physician within 2-3 weeks of your discharge from [facility].  If you experience any worsening or recurrence of your symptoms, particularly worsening or high fever, shortness of breathe, extreme fatigue, or bloody cough please call 9-1-1 immediately or report to the nearest Emergency Department. If you have any questions or concerns, please do not hesitate to call the hospital at [phone].    Diagnosis: ST elevation MI (STEMI)  Assessment and Plan of Treatment:     Diagnosis: Sepsis, unspecified organism  Assessment and Plan of Treatment:     Diagnosis: Acute respiratory failure with hypoxia  Assessment and Plan of Treatment:      PRINCIPAL DISCHARGE DIAGNOSIS  Diagnosis: Congestive heart failure (CHF)  Assessment and Plan of Treatment: Heart failure, sometimes known as congestive heart failure, occurs when your heart muscle doesn't pump blood as well as it should. Certain conditions, such as narrowed arteries in your heart (coronary artery disease) or high blood pressure, gradually leave your heart too weak or stiff to fill and pump efficiently. Not all conditions that lead to heart failure can be reversed, but treatments can improve the signs and symptoms of heart failure and help you live longer. Lifestyle changes — such as exercising, reducing sodium in your diet, managing stress and losing weight — can improve your quality of life.  One way to prevent heart failure is to prevent and control conditions that cause heart failure, such as coronary artery disease, high blood pressure, diabetes or obesity. Although these signs and symptoms may be due to heart failure, there are many other possible causes, including other life-threatening heart and lung conditions. Don't try to diagnose yourself. Call 911 or your local emergency number for immediate help. Emergency room doctors will try to stabilize your condition and determine if your symptoms are due to heart failure or something else. Weigh yourself daily.  If you gain 3lbs in 3 days, or 5lbs in a week call your Health Care Provider.  Eat a low sodium diet (less than 2 grams of sodium a day).  Call your Health Care Provider if you have any swelling or increased swelling in your feet, ankles, and/or stomach.  Take all of your medication as directed.  If you become dizzy call your Health Care Provider.  If you experience chest pain or shortness of breath, call an ambulance and come to the ED. Please continue to take  -Valsartan 40mg daily  -Metoprolol 25mg daily   You appointment with your cardiologist Dr Mary Plaza is scheduled for   3/4/21 @ 1pm  [address]  [phone]  Please call to schedule an appointment with your PCP to be seen within the next 1-2 weeks. Talk to your PCP about starting a heart medication, entresto.      SECONDARY DISCHARGE DIAGNOSES  Diagnosis: Cough with exposure to COVID-19 virus  Assessment and Plan of Treatment: During your admission you were thought to be high suspician for the novel coronavirus (COVID-19). Upon discharge, you must self-quarantine UNTIL 3/1, or until the Department of Health contacts you. Please wear a face mask if you are around other individuals. Try to avoid contact with house members, family, and friends for the duration of this quarantine. Please follow up with your primary care physician within 2-3 weeks of your discharge from [facility].  If you experience any worsening or recurrence of your symptoms, particularly worsening or high fever, shortness of breathe, extreme fatigue, or bloody cough please call 9--1 immediately or report to the nearest Emergency Department. If you have any questions or concerns, please do not hesitate to call the hospital at [phone].    Diagnosis: ST elevation MI (STEMI)  Assessment and Plan of Treatment: You were found to have a heart attack, or myocardial infarction. The heart, like all other organs and tissues in the body, requires a supply of blood. The blood supply to the heart is provided by blood vessels called the coronary arteries. Myocardial infarction (MI) is damage or death of part of the heart muscle due to lack of blood flow through the coronary arteries. Heart attacks can be caused by fatty deposits in the coronary arteries (or plaques), a disease called coronary heart diease. They can also be caused by the formation of clots in the coronary arteries, blocking blood flow to the heart muscles. Typical symptoms include chest pain or discomfort, pain through the chest and other parts of the body, such as the arm, upper abdomen, shoulders, arms, neck, throat, jaw, or teeth. Other symptom include shortnes of  breath, nausea, vomiting, sweating, heart palpitations, and lightheadness. If you think you may be having symptoms of another heart attack, call 911 immediately. You did not have a stent placed as we found that the muscles of your heart that were affected were too scarred and would not benefit from a stent to open the artery. You were treated with medicines, including oral blood-thinnerrs, aspirin and brilinta. You were also started on a choleterol medication, atorvastatin, which also decreases inflammation after a heart attack. You were started on blood pressure medications, valsartan, which also helps with healing of the heart, and metoprolol, which also helps to control your heart rate in  of abnormal rhythms after heart attack. Please continue these medications and follow up with your cardiologist and primary care doctor within 1-2 weeks.    Diagnosis: Sepsis, unspecified organism  Assessment and Plan of Treatment: You were found to have signs of infection. It was unclear exactly where the infection was, however we treated you with a strong antibiotic, zosyn, and your symptoms improved. Please follow up with your primary care provider.    Diagnosis: Acute respiratory failure with hypoxia  Assessment and Plan of Treatment: You were found to have difficulty breathing, requiring supplemental oxygen. Your shortness of breath could have been from your heart failure, which causes fluid to back up into the lungs, making it difficult to breath. You were also suspected to have COVID given your symptoms. We also treated you with antibiotics which would have treated any bacterial pneumonia. Your breathing improved and you were able to be weaned down off the supplemental oxygen. Please follow up with your primary care provider.

## 2021-02-23 NOTE — PROGRESS NOTE ADULT - SUBJECTIVE AND OBJECTIVE BOX
PROCEDURE: CORONARY ANGIOGRAM, LHC, LVGRAM, RHC  INDICATION: NSTEMI, HFrEF  REFERRING: DR. FRANCISCO MD  ATTENDING: DR. MARYANA CHRISTIANSON MD   ACCESS: RRA, RIJ (swan sutured in place)      FINDINGS   [facility]  RA=1  RV=21/0  PA=16/9/13  PCW=4  PA sat=62.6  C.I=2.5    CORONARY ANGIOGRAM  pRCA= 40%   LM= normal   pLCx= 30%   mLAD= 99% at bifurcation with D1  D1= 99%   LVEF=15%   LVEDP: 4 mmHg      A/P  -in the setting of severe LV dysfunction, no chest pain, q waves on echo recc viability study prior to impella assisted PCI of LAD   -will also wait for inflammatory markers to improve  -cont with work up and treatment of inflammation/infection  -cont with care as per CCU with GDMT for HFrEF

## 2021-02-23 NOTE — PROGRESS NOTE ADULT - SUBJECTIVE AND OBJECTIVE BOX
OVERNIGHT EVENTS: NAEO    SUBJECTIVE / INTERVAL HPI: Patient seen and examined at bedside. Patient denying chest pain, SOB, palpitations, cough. Patient denies fever, chills, HA, Dizziness, change in vision/hearing, N/V, abdominal pain, diarrhea, constipation, hematochezia/melena, dysuria, hematuria, new onset weakness/numbness, LE pain and/or swelling    otherwise ROS negative       PHYSICAL EXAM:    General: WDWN  HEENT: NC/AT; PERRL, anicteric sclera; MMM  Neck: supple  Cardiovascular: +S1/S2, RRR  Respiratory: CTA B/L; no W/R/R  Gastrointestinal: soft, NT/ND; +BSx4  Extremities: WWP; no edema, clubbing or cyanosis  Vascular: 2+ radial, DP/PT pulses B/L  Neurological: AAOx3; no focal deficits  Psychiatric: pleasant mood and affect  Dermatologic: no appreciable wounds or damage to the skin    VITAL SIGNS:  Vital Signs Last 24 Hrs  T(C): 36.4 (23 Feb 2021 06:05), Max: 38.4 (22 Feb 2021 21:00)  T(F): 97.6 (23 Feb 2021 06:05), Max: 101.2 (22 Feb 2021 21:00)  HR: 85 (23 Feb 2021 08:00) (83 - 106)  BP: 91/71 (23 Feb 2021 07:00) (82/56 - 111/72)  BP(mean): 77 (23 Feb 2021 07:00) (64 - 87)  RR: 17 (23 Feb 2021 08:00) (11 - 28)  SpO2: 100% (23 Feb 2021 08:00) (95% - 100%)      MEDICATIONS:  MEDICATIONS  (STANDING):  acetaZOLAMIDE  IVPB 250 milliGRAM(s) IV Intermittent every 12 hours  aspirin enteric coated 81 milliGRAM(s) Oral every 24 hours  atorvastatin 80 milliGRAM(s) Oral at bedtime  chlorhexidine 2% Cloths 1 Application(s) Topical <User Schedule>  heparin  Infusion 1850 Unit(s)/Hr (20 mL/Hr) IV Continuous <Continuous>  melatonin 5 milliGRAM(s) Oral at bedtime  piperacillin/tazobactam IVPB.. 4.5 Gram(s) IV Intermittent every 6 hours  potassium chloride    Tablet ER 40 milliEquivalent(s) Oral once  sodium chloride 0.9%. 500 milliLiter(s) (100 mL/Hr) IV Continuous <Continuous>  ticagrelor 90 milliGRAM(s) Oral every 12 hours    MEDICATIONS  (PRN):  acetaminophen   Tablet .. 650 milliGRAM(s) Oral every 6 hours PRN Temp greater or equal to 38C (100.4F), Mild Pain (1 - 3), Moderate Pain (4 - 6)  albuterol/ipratropium for Nebulization 3 milliLiter(s) Nebulizer every 6 hours PRN Shortness of Breath and/or Wheezing  guaiFENesin   Syrup  (Sugar-Free) 200 milliGRAM(s) Oral every 6 hours PRN Cough      ALLERGIES:  Allergies    No Known Allergies    Intolerances        LABS:                        12.1   12.25 )-----------( 912      ( 23 Feb 2021 04:55 )             37.4     02-23    135  |  89<L>  |  37<H>  ----------------------------<  139<H>  3.7   |  29  |  1.01    Ca    10.2      23 Feb 2021 04:55  Phos  4.3     02-23  Mg     2.3     02-23    TPro  7.7  /  Alb  3.4  /  TBili  0.4  /  DBili  x   /  AST  69<H>  /  ALT  81<H>  /  AlkPhos  159<H>  02-23    PT/INR - ( 23 Feb 2021 04:55 )   PT: 14.2 sec;   INR: 1.19          PTT - ( 23 Feb 2021 04:55 )  PTT:69.7 sec    CAPILLARY BLOOD GLUCOSE      POCT Blood Glucose.: 143 mg/dL (23 Feb 2021 05:28)      RADIOLOGY & ADDITIONAL TESTS: Reviewed. INCOMPLETE NOTE    OVERNIGHT EVENTS: NAEO    SUBJECTIVE / INTERVAL HPI: Patient seen and examined at bedside. Patient denying chest pain, SOB, palpitations, cough. Patient denies fever, chills, HA, Dizziness, change in vision/hearing, N/V, abdominal pain, diarrhea, constipation, hematochezia/melena, dysuria, hematuria, new onset weakness/numbness, LE pain and/or swelling    otherwise ROS negative       PHYSICAL EXAM:    General: WDWN  HEENT: NC/AT; PERRL, anicteric sclera; MMM  Neck: supple  Cardiovascular: +S1/S2, RRR  Respiratory: CTA B/L; no W/R/R  Gastrointestinal: soft, NT/ND; +BSx4  Extremities: WWP; no edema, clubbing or cyanosis  Vascular: 2+ radial, DP/PT pulses B/L  Neurological: AAOx3; no focal deficits  Psychiatric: pleasant mood and affect  Dermatologic: no appreciable wounds or damage to the skin    VITAL SIGNS:  Vital Signs Last 24 Hrs  T(C): 36.4 (23 Feb 2021 06:05), Max: 38.4 (22 Feb 2021 21:00)  T(F): 97.6 (23 Feb 2021 06:05), Max: 101.2 (22 Feb 2021 21:00)  HR: 85 (23 Feb 2021 08:00) (83 - 106)  BP: 91/71 (23 Feb 2021 07:00) (82/56 - 111/72)  BP(mean): 77 (23 Feb 2021 07:00) (64 - 87)  RR: 17 (23 Feb 2021 08:00) (11 - 28)  SpO2: 100% (23 Feb 2021 08:00) (95% - 100%)      MEDICATIONS:  MEDICATIONS  (STANDING):  acetaZOLAMIDE  IVPB 250 milliGRAM(s) IV Intermittent every 12 hours  aspirin enteric coated 81 milliGRAM(s) Oral every 24 hours  atorvastatin 80 milliGRAM(s) Oral at bedtime  chlorhexidine 2% Cloths 1 Application(s) Topical <User Schedule>  heparin  Infusion 1850 Unit(s)/Hr (20 mL/Hr) IV Continuous <Continuous>  melatonin 5 milliGRAM(s) Oral at bedtime  piperacillin/tazobactam IVPB.. 4.5 Gram(s) IV Intermittent every 6 hours  potassium chloride    Tablet ER 40 milliEquivalent(s) Oral once  sodium chloride 0.9%. 500 milliLiter(s) (100 mL/Hr) IV Continuous <Continuous>  ticagrelor 90 milliGRAM(s) Oral every 12 hours    MEDICATIONS  (PRN):  acetaminophen   Tablet .. 650 milliGRAM(s) Oral every 6 hours PRN Temp greater or equal to 38C (100.4F), Mild Pain (1 - 3), Moderate Pain (4 - 6)  albuterol/ipratropium for Nebulization 3 milliLiter(s) Nebulizer every 6 hours PRN Shortness of Breath and/or Wheezing  guaiFENesin   Syrup  (Sugar-Free) 200 milliGRAM(s) Oral every 6 hours PRN Cough      ALLERGIES:  Allergies    No Known Allergies    Intolerances        LABS:                        12.1   12.25 )-----------( 912      ( 23 Feb 2021 04:55 )             37.4     02-23    135  |  89<L>  |  37<H>  ----------------------------<  139<H>  3.7   |  29  |  1.01    Ca    10.2      23 Feb 2021 04:55  Phos  4.3     02-23  Mg     2.3     02-23    TPro  7.7  /  Alb  3.4  /  TBili  0.4  /  DBili  x   /  AST  69<H>  /  ALT  81<H>  /  AlkPhos  159<H>  02-23    PT/INR - ( 23 Feb 2021 04:55 )   PT: 14.2 sec;   INR: 1.19          PTT - ( 23 Feb 2021 04:55 )  PTT:69.7 sec    CAPILLARY BLOOD GLUCOSE      POCT Blood Glucose.: 143 mg/dL (23 Feb 2021 05:28)      RADIOLOGY & ADDITIONAL TESTS: Reviewed. OVERNIGHT EVENTS: PTT therapeutic x3    SUBJECTIVE / INTERVAL HPI: Patient seen and examined at bedside. Patient endorsing cough associated w. intermittent chest pain and SOB. no CP or SOB at rest. no paroxymal orthopnea. Pt denies palpitations, fever, chills, HA, Dizziness, change in vision/hearing, N/V, abdominal pain, diarrhea, constipation, hematochezia/melena, dysuria, hematuria, new onset weakness/numbness, LE pain and/or swelling    otherwise ROS negative       PHYSICAL EXAM:  General: NAD, resting comfortably in bed, speaking in full sentences on HFNC  HEENT: NC/AT  Cardiovascular: RRR; +S1/S2, no r/m/g  Respiratory: CTA b/l; no W/R/R; no retractions or accessory muscle use  Gastrointestinal: soft, NT/ND; +BSx4, (-) Durant's sign  Extremities: WWP; no edema, clubbing or cyanosis. L A-line in place  Vascular: 2+ radial, DP/PT pulses B/L  Neurological: AAOx3; no focal deficits    VITAL SIGNS:  Vital Signs Last 24 Hrs  T(C): 36.4 (23 Feb 2021 06:05), Max: 38.4 (22 Feb 2021 21:00)  T(F): 97.6 (23 Feb 2021 06:05), Max: 101.2 (22 Feb 2021 21:00)  HR: 85 (23 Feb 2021 08:00) (83 - 106)  BP: 91/71 (23 Feb 2021 07:00) (82/56 - 111/72)  BP(mean): 77 (23 Feb 2021 07:00) (64 - 87)  RR: 17 (23 Feb 2021 08:00) (11 - 28)  SpO2: 100% (23 Feb 2021 08:00) (95% - 100%)      MEDICATIONS:  MEDICATIONS  (STANDING):  acetaZOLAMIDE  IVPB 250 milliGRAM(s) IV Intermittent every 12 hours  aspirin enteric coated 81 milliGRAM(s) Oral every 24 hours  atorvastatin 80 milliGRAM(s) Oral at bedtime  chlorhexidine 2% Cloths 1 Application(s) Topical <User Schedule>  heparin  Infusion 1850 Unit(s)/Hr (20 mL/Hr) IV Continuous <Continuous>  melatonin 5 milliGRAM(s) Oral at bedtime  piperacillin/tazobactam IVPB.. 4.5 Gram(s) IV Intermittent every 6 hours  potassium chloride    Tablet ER 40 milliEquivalent(s) Oral once  sodium chloride 0.9%. 500 milliLiter(s) (100 mL/Hr) IV Continuous <Continuous>  ticagrelor 90 milliGRAM(s) Oral every 12 hours    MEDICATIONS  (PRN):  acetaminophen   Tablet .. 650 milliGRAM(s) Oral every 6 hours PRN Temp greater or equal to 38C (100.4F), Mild Pain (1 - 3), Moderate Pain (4 - 6)  albuterol/ipratropium for Nebulization 3 milliLiter(s) Nebulizer every 6 hours PRN Shortness of Breath and/or Wheezing  guaiFENesin   Syrup  (Sugar-Free) 200 milliGRAM(s) Oral every 6 hours PRN Cough      ALLERGIES:  Allergies    No Known Allergies    Intolerances        LABS:                        12.1   12.25 )-----------( 912      ( 23 Feb 2021 04:55 )             37.4     02-23    135  |  89<L>  |  37<H>  ----------------------------<  139<H>  3.7   |  29  |  1.01    Ca    10.2      23 Feb 2021 04:55  Phos  4.3     02-23  Mg     2.3     02-23    TPro  7.7  /  Alb  3.4  /  TBili  0.4  /  DBili  x   /  AST  69<H>  /  ALT  81<H>  /  AlkPhos  159<H>  02-23    PT/INR - ( 23 Feb 2021 04:55 )   PT: 14.2 sec;   INR: 1.19          PTT - ( 23 Feb 2021 04:55 )  PTT:69.7 sec    CAPILLARY BLOOD GLUCOSE      POCT Blood Glucose.: 143 mg/dL (23 Feb 2021 05:28)      RADIOLOGY & ADDITIONAL TESTS: Reviewed.

## 2021-02-23 NOTE — DISCHARGE NOTE PROVIDER - CARE PROVIDERS DIRECT ADDRESSES
,[email or web address],DirectAddress_Unknown ,[email or web address]_Unknown,[email or web address]

## 2021-02-23 NOTE — DISCHARGE NOTE PROVIDER - HOSPITAL COURSE
#Discharge: do not delete    Patient is __ yo M/F with past medical history of _____  Presented with _____, found to have _____  Problem List/Main Diagnoses (system-based):   Inpatient treatment course:   New medications:   Labs to be followed outpatient:   Exam to be followed outpatient:    #Discharge: do not delete    Patient is a 61yo M PMH chronic low back pain with sciatica, arthritis, psoriasis presenting with 1.5 weeks of shortness of breath and chest pain. Found to have a late presenting STEMI and acute hypoxic respiratory failure 2/2 to new onset CHF and PNA.        Problem List/Main Diagnoses (system-based):   Inpatient treatment course:   New medications:   Labs to be followed outpatient:   Exam to be followed outpatient:    #Discharge: do not delete    Patient is a 61yo M PMH chronic low back pain with sciatica, arthritis, psoriasis presenting with 1.5 weeks of shortness of breath and chest pain. Found to have a late presenting STEMI and acute hypoxic respiratory failure 2/2 to new onset CHF and PNA.        Problem List/Main Diagnoses (system-based):   #ACS   Presenting with diffuse chest pain. Trops 0.67 (repeat 0.52). EKG with septal q waves and ST elevation c/f late presenting STEMI. No known CAD. S/p Aspirin 325mg, Plavix 600mg, Heparin bolus. TTE  this admission w/ Severely reduced left ventricular systolic function and regional wall motion abnormalities consistent with ischemic heart disease. LV EF 24%.  -s/p hep bolus. and heparin infusion prior to diagnostic catheterization. Cardiac catheretization initially held due to pt instability in the setting of late presenting STEMI. Pt w. paryxmal orthopnea   PLAN  -c/w Aspirin 81mg  -dc Plavix 75mg, plan for Brilinta 180mg tonight at 9pm and 90m g bid starting 2/22 AM  -c/w Atorvastatin 80mg  -c/w heparin gtt  -PTT therapeutic x3 @20  -plan for CATH today    #New onset CHF   Presenting with SOB. Pt found to be hypoxic and CXR showing fluid overload. BNP 9234. S/p Lasix 60mg in ED. Likely in setting of ACS. Pt not responding appropriately to 60 IV Lasix x 2 requiring 5mg metolazone, 2mg Bumex STAT then 1mg/hr Bumex infusion.   -bumex dc'd (2/22) pt w/ improved volume status. AM of 2/22 pt found to be very lethargic. ABG showed pH of 7.54 Bicarb of 35 from 26 on admission.  PLAN  -cont to hold further diuresis  -s/p acetazolamide 250mg IVPB x2. ABG improved. mentating well today. d/c'd acetazolamide   -transition to NC  -f/u AM CMP    Pulmonary   #Acute hypoxic respiratory failure   On admission hypoxic to 87%. Improvement to 93% on 3L. Likely 2/2 PNA (COVID vs bacterial) and fluid overloaded in setting of ACS w/ c/f for new onset HF. S/p Solumedrol 125mg, Duoneb x 1.   -Supplemental oxygen prn. Pt w. inc O2 NC increased from 3 to 6L sating in low 90's, w/ increased WOB requiring Bumex drip.  -S/p Solumedrol 125mg   -bumex dc'd (2/22)  PLAN  -Duonebs q6 prn  -Management for PNA below  -Currently on HFNC, BIPAP at bedside  -f/u CMP, replete lytes and monitor LFTs to assess for organ perfusion  -transition to NC    #Sepsis 2/2 CAP   Pt meeting 3/4 SIRS criteria (tachycardic, tachypneic, leukocytosis). Was started on CAP tx at urgent care. CXR showing pulmonary edema with suspected b/l opacities. Procalcitonin 3.36. s/p Azithromycin 500mg and Ceftriaxone 1g in ED. Pt initially dc'd abx due to low suspicion for infection w/ SIRS + leukocytosis presumed to be 2/2 to CHF exacerbation.  -Pt spiked new fever of 103.6 rectal, tachycardic to 131 and tachypneic to 26. Pt restarted on vanc + zosyn. w/ source still likely to be PNA  -pt w/ increasing white count   -UA negative, Urine strep and legi negative    -Patient also COVID PCR neg x3 but positive for COVID antibodies. Patient w/ high fevers, increased WBC however hemodynamically stable s/p bumex gtt which increases suspicion for COVID PNA vs less likely sepsis 2/2 bacterial infection given patient maintaining pressures i/s/o diuresis (would expect patient to go into septic shock if bacterial PNA)  PLAN  -UA (2/29) neg  -BCx (2/20 AM and PM) NGTD  -MRSA swab neg -> vanc dc'd  -Lactate normalized  -C/w zosyn 4.5g q6h x7 days (2/20-2/26)  -F/u sputum cx: NGTD  -Will consider deescalating or discontinuing ABX if no culture data returns  -f/u ID + epidemiology for pt to be transferred back to non-COVID CCU    #Anemia   Found to have an Hgb of 11.6. Per HIE labs, baseline Hgb is 15.   -iron studies c/w anemia of chronic disease  PLAN  -in setting of sepsis will hold iron tx for now   -Maintain active T&S    #Thrombocytosis  -Plts 593, likely reactive in setting of ACS and PNA although per HIE labs Plts 412 in 1/2020.   PLAN  -Monitor CBC     New medications:   Labs to be followed outpatient:   Exam to be followed outpatient:    #Discharge: do not delete    Patient is a 61yo M PMH chronic low back pain with sciatica, arthritis, psoriasis presenting with 1.5 weeks of shortness of breath and chest pain. Found to have a late presenting STEMI and acute hypoxic respiratory failure 2/2 to new onset CHF and PNA.        Problem List/Main Diagnoses (system-based):       #ACS   Presenting with diffuse chest pain. Trops 0.67 (repeat 0.52). EKG with septal q waves and ST elevation c/f late presenting STEMI. No known CAD. S/p Aspirin 325mg, Plavix 600mg, Heparin bolus. TTE  this admission w/ Severely reduced left ventricular systolic function and regional wall motion abnormalities consistent with ischemic heart disease. LV EF 24%.  Once medically optimized pt underwent diagnostic cardiac catheterization which showed******  Pt discharged on: Aspirin 81mg, Brilinta 90mg, Atorvastatin 80mg    #New onset CHF  Presenting with SOB. Pt found to be hypoxic and CXR showing fluid overload. BNP 9234. S/p Lasix 60mg in ED. Likely in setting of ACS. Pt not responding appropriately requiring Bumex drip. TTE on admission w/   CONCLUSIONS:   1. Normal left ventricular cavity size.   2. Severely reduced left ventricular systolic function, LV EF 24%.   3. Regional wall motion abnormalities consistent with ischemic heart disease.   4. Grade II left ventricular diastolic dysfunction with elevated filling pressure.   5. Normal right ventricular size and systolic function.   6. Aortic sclerosis without significant stenosis.   7. Pulmonary hypertension present, pulmonary artery systolic pressure is 40 mmHg.   8. No pericardial effusion.  Pt discharged on:**** w/ cardiology f/u outpatient     #Sepsis 2/2 CAP   Pt meeting 3/4 SIRS criteria (tachycardic, tachypneic, leukocytosis). Was started on CAP tx at urgent care. CXR showing pulmonary edema with suspected b/l opacities. Procalcitonin 3.36. s/p Azithromycin 500mg and Ceftriaxone 1g in ED. Pt initially dc'd abx due to low suspicion for infection w/ SIRS + leukocytosis presumed to be 2/2 to CHF exacerbation.  -Pt spiked new fever of 103.6 rectal, tachycardic to 131 and tachypneic to 26. Pt restarted on vanc + zosyn. w/ source still likely to be PNA  -UA negative, Urine strep and legi negative    -Patient also COVID PCR neg x3 but positive for COVID antibodies. Patient w/ high fevers, increased WBC however hemodynamically stable s/p bumex gtt which increases suspicion for COVID PNA vs less likely sepsis 2/2 bacterial infection given patient maintaining pressures i/s/o diuresis (would expect patient to go into septic shock if bacterial PNA)  -pts BC grew?  -pt was treated on zosyn?   Pt was discharged on*** for duration of??    #Anemia   Found to have an Hgb of 11.6. Per HIE labs, baseline Hgb is 15.   -iron studies c/w anemia of chronic disease. iron tx held in setting of sepsis   to be follow up with PCP after DC    #Thrombocytosis  -Plts 593, likely reactive in setting of ACS and PNA although per HIE labs Plts 412 in 1/2020.   to be follow up with PCP after DC      New medications:   Labs to be followed outpatient:   Exam to be followed outpatient:    #Discharge: do not delete    Patient is a 59yo M PMH chronic low back pain with sciatica, arthritis, psoriasis presenting with 1.5 weeks of shortness of breath and chest pain. Found to have a late presenting STEMI and acute hypoxic respiratory failure 2/2 to new onset CHF and PNA.        Problem List/Main Diagnoses (system-based):       #ACS   Presenting with diffuse chest pain. Trops 0.67 (repeat 0.52). EKG with septal q waves and ST elevation c/f late presenting STEMI. No known CAD. S/p Aspirin 325mg, Plavix 600mg, Heparin bolus. TTE  this admission w/ Severely reduced left ventricular systolic function and regional wall motion abnormalities consistent with ischemic heart disease. LV EF 24%.  Once medically optimized pt underwent diagnostic cardiac catheterization which showed mid LAD 99% subtotal occlusion, LAD D2: ostial 90% stenosis. LV gram: EF 15%, anterior apical wall akinesis.   -initally planned for staged Impella assisted PCI of mid LAD/D2 bifurcation after viability study/inflammatory markers improve. Cardiac MRI revealing nonviable myocardium in the LAD territory.  Pt discharged on: Aspirin 81mg, Brilinta 90mg, Atorvastatin 80mg    #New onset CHF  Presenting with SOB. Pt found to be hypoxic and CXR showing fluid overload. BNP 9234. S/p Lasix 60mg in ED. Likely in setting of ACS. Pt not responding appropriately requiring Bumex drip. TTE on admission w/   CONCLUSIONS:   1. Normal left ventricular cavity size.   2. Severely reduced left ventricular systolic function, LV EF 24%.   3. Regional wall motion abnormalities consistent with ischemic heart disease.   4. Grade II left ventricular diastolic dysfunction with elevated filling pressure.   5. Normal right ventricular size and systolic function.   6. Aortic sclerosis without significant stenosis.   7. Pulmonary hypertension present, pulmonary artery systolic pressure is 40 mmHg.   8. No pericardial effusion.  Pt discharged on: Valsartan 40mg QD, Metoprolol *** QD w/ cardiology f/u outpatient     #Sepsis 2/2 CAP   Pt meeting 3/4 SIRS criteria (tachycardic, tachypneic, leukocytosis). Was started on CAP tx at urgent care. CXR showing pulmonary edema with suspected b/l opacities. Procalcitonin 3.36. s/p Azithromycin 500mg and Ceftriaxone 1g in ED. Pt initially dc'd abx due to low suspicion for infection w/ SIRS + leukocytosis presumed to be 2/2 to CHF exacerbation.  -Pt spiked new fever of 103.6 rectal, tachycardic to 131 and tachypneic to 26. Pt restarted on vanc + zosyn. w/ source still likely to be PNA  -UA negative, Urine strep and legi negative    -Patient also COVID PCR neg x3 but positive for COVID antibodies.   -Pt BC remained without growth  -sputum cultures growing Melissa Dubliniensis (rare opportunistic yeast).  HIV and Hep C NEGATIVE this admission. fevers resolved without antifungal tx.   -Pt remained on COVID unit due to high suspicion for COVID due to initial SOB, O2 req, and high inflammatory markers.  -rhem workup was initiated, SONIA negative   -Pt completed 1 week course of zosyn 4.5 q6 x 7days. No COVID tx given.  to be follow up with PCP after DC    #Anemia   Found to have an Hgb of 11.6. Per HIE labs, baseline Hgb is 15.   -iron studies c/w anemia of chronic disease. iron tx held in setting of sepsis   to be follow up with PCP after DC    #Thrombocytosis  -Plts 593, likely reactive in setting of ACS and PNA although per HIE labs Plts 412 in 1/2020.   to be follow up with PCP after DC      New medications: Valsartan 40mg QD, Metoprolol *** QD, Aspirin 81mg, Brilinta 90mg, Atorvastatin 80mg  Labs to be followed outpatient: CBC, CMP  Exam to be followed outpatient: cardiology f/u outpatient    #Discharge: do not delete    Patient is a 61yo M PMH chronic low back pain with sciatica, arthritis, psoriasis presenting with 1.5 weeks of shortness of breath and chest pain. Found to have a late presenting STEMI and acute hypoxic respiratory failure 2/2 to new onset CHF and PNA.        Problem List/Main Diagnoses (system-based):       #ACS   Presenting with diffuse chest pain. Trops 0.67 (repeat 0.52). EKG with septal q waves and ST elevation c/f late presenting STEMI. No known CAD. S/p Aspirin 325mg, Plavix 600mg, Heparin bolus. TTE  this admission w/ Severely reduced left ventricular systolic function and regional wall motion abnormalities consistent with ischemic heart disease. LV EF 24%.  Once medically optimized pt underwent diagnostic cardiac catheterization which showed mid LAD 99% subtotal occlusion, LAD D2: ostial 90% stenosis. LV gram: EF 15%, anterior apical wall akinesis.   -initally planned for staged Impella assisted PCI of mid LAD/D2 bifurcation after viability study/inflammatory markers improve. Cardiac MRI revealing nonviable myocardium in the LAD territory.  Pt discharged on: Aspirin 81mg, Brilinta 90mg, Atorvastatin 80mg    #New onset CHF  Presenting with SOB. Pt found to be hypoxic and CXR showing fluid overload. BNP 9234. S/p Lasix 60mg in ED. Likely in setting of ACS. Pt not responding appropriately requiring Bumex drip. TTE on admission w/   CONCLUSIONS:   1. Normal left ventricular cavity size.   2. Severely reduced left ventricular systolic function, LV EF 24%.   3. Regional wall motion abnormalities consistent with ischemic heart disease.   4. Grade II left ventricular diastolic dysfunction with elevated filling pressure.   5. Normal right ventricular size and systolic function.   6. Aortic sclerosis without significant stenosis.   7. Pulmonary hypertension present, pulmonary artery systolic pressure is 40 mmHg.   8. No pericardial effusion.  Pt discharged on: Valsartan 40mg QD, Metoprolol 25 QD w/ cardiology f/u outpatient     #Sepsis 2/2 CAP   Pt meeting 3/4 SIRS criteria (tachycardic, tachypneic, leukocytosis). Was started on CAP tx at urgent care. CXR showing pulmonary edema with suspected b/l opacities. Procalcitonin 3.36. s/p Azithromycin 500mg and Ceftriaxone 1g in ED. Pt initially dc'd abx due to low suspicion for infection w/ SIRS + leukocytosis presumed to be 2/2 to CHF exacerbation.  -Pt spiked new fever of 103.6 rectal, tachycardic to 131 and tachypneic to 26. Pt restarted on vanc + zosyn. w/ source still likely to be PNA  -UA negative, Urine strep and legi negative    -Patient also COVID PCR neg x3 but positive for COVID antibodies.   -Pt BC remained without growth  -sputum cultures growing Melissa Dubliniensis (rare opportunistic yeast).  HIV and Hep C NEGATIVE this admission. fevers resolved without antifungal tx.   -Pt remained on COVID unit due to high suspicion for COVID due to initial SOB, O2 req, and high inflammatory markers. No COVID tx given.  -rhem workup was initiated, SONIA negative   -Pt completed 1 week course of zosyn 4.5 q6 x 7days.   to be follow up with PCP after DC  -PT IS TO QUARANTINE UNTIL 3/2      #Anemia   Found to have an Hgb of 11.6. Per HIE labs, baseline Hgb is 15.   -iron studies c/w anemia of chronic disease. iron tx held in setting of sepsis   to be follow up with PCP after DC    #Thrombocytosis  -Plts 593, likely reactive in setting of ACS and PNA although per HIE labs Plts 412 in 1/2020.   to be follow up with PCP after DC      New medications: Valsartan 40mg QD, Metoprolol 25mg QD, Aspirin 81mg, Brilinta 90mg, Atorvastatin 80mg  Labs to be followed outpatient: CBC, CMP  Exam to be followed outpatient: cardiology f/u outpatient

## 2021-02-23 NOTE — DISCHARGE NOTE PROVIDER - NSDCCPTREATMENT_GEN_ALL_CORE_FT
PRINCIPAL PROCEDURE  Procedure: Transthoracic echocardiography (TTE)  Findings and Treatment: TTE 2/20   CONCLUSIONS:   1. Normal left ventricular cavity size.   2. Severely reduced left ventricular systolic function, LV EF 24%.   3. Regional wall motion abnormalities consistent with ischemic heart disease.   4. Grade II left ventricular diastolic dysfunction with elevated filling pressure.   5. Normal right ventricular size and systolic function.   6. Aortic sclerosis without significant stenosis.   7. Pulmonary hypertension present, pulmonary artery systolic pressure is 40 mmHg.   8. No pericardial effusion.         PRINCIPAL PROCEDURE  Procedure: Transthoracic echocardiography (TTE)  Findings and Treatment: TTE 2/20   CONCLUSIONS:   1. Normal left ventricular cavity size.   2. Severely reduced left ventricular systolic function, LV EF 24%.   3. Regional wall motion abnormalities consistent with ischemic heart disease.   4. Grade II left ventricular diastolic dysfunction with elevated filling pressure.   5. Normal right ventricular size and systolic function.   6. Aortic sclerosis without significant stenosis.   7. Pulmonary hypertension present, pulmonary artery systolic pressure is 40 mmHg.   8. No pericardial effusion.        SECONDARY PROCEDURE  Procedure: MRI cardiac function  Findings and Treatment: V.INTERPRETATION:  1.  The left ventricle (LV) is dilated. There is akinesis and thinning of the mid to apical anterior, mid anteroseptal, apical septal, apical lateral, and apical inferior extending to the apex. Mild hypokinesis of the basal anterior and normal motion in the remaining regions. Left ventricular global systolic function is severely reduced. The LV ejection fraction is 25 %.  2.  The right ventricle (RV) is normal in size. RV global systolic function is normal. The RV ejection fraction is 54 %.  3.  No significant valvular abnormalities.  4.  No significant abnormalities of the visualized portions of the great vessels.  5.  On delayed enhancement imaging,  there is transmural enhancement and no reflow in the areas of akinesis and thinning (mid to apical anterior, mid anteroseptal, apical septal, apical lateral, and apical inferior extending to the apex) consistent with nonviable myocardium in the LAD territory.    Procedure: Complete cardiac catheterization  Findings and Treatment:      PRINCIPAL PROCEDURE  Procedure: Transthoracic echocardiography (TTE)  Findings and Treatment: TTE 2/20   CONCLUSIONS:   1. Normal left ventricular cavity size.   2. Severely reduced left ventricular systolic function, LV EF 24%.   3. Regional wall motion abnormalities consistent with ischemic heart disease.   4. Grade II left ventricular diastolic dysfunction with elevated filling pressure.   5. Normal right ventricular size and systolic function.   6. Aortic sclerosis without significant stenosis.   7. Pulmonary hypertension present, pulmonary artery systolic pressure is 40 mmHg.   8. No pericardial effusion.        SECONDARY PROCEDURE  Procedure: MRI cardiac function  Findings and Treatment: V.INTERPRETATION:  1.  The left ventricle (LV) is dilated. There is akinesis and thinning of the mid to apical anterior, mid anteroseptal, apical septal, apical lateral, and apical inferior extending to the apex. Mild hypokinesis of the basal anterior and normal motion in the remaining regions. Left ventricular global systolic function is severely reduced. The LV ejection fraction is 25 %.  2.  The right ventricle (RV) is normal in size. RV global systolic function is normal. The RV ejection fraction is 54 %.  3.  No significant valvular abnormalities.  4.  No significant abnormalities of the visualized portions of the great vessels.  5.  On delayed enhancement imaging,  there is transmural enhancement and no reflow in the areas of akinesis and thinning (mid to apical anterior, mid anteroseptal, apical septal, apical lateral, and apical inferior extending to the apex) consistent with nonviable myocardium in the LAD territory.    Procedure: Complete cardiac catheterization  Findings and Treatment: 2/23  Procedures performed: coronary angiogram, LHC, RHC, [place] insertion.  Indication for procedure: CHF low EF, NSTEMI.  Access site: R radial 6 Fr, closed with TR band. RIJ 8.5 Fr used for [place] insertion was sutured on skin.  Coronary angiogram  LM: mild luminal irregularities.  LAD: prox 30% stenosis, mid 99% subtotal occlusion.  D1: ostial 50%  D2: ostial 90% stenosis.  LCX/OM: mild luminal irregularities.  RCA: prox 50% stenosis.  LV gram: EF 15%, anterior apical wall akinesis.   LVEDP 2mmHg  No LV-Ao gradient noted on pullback.  RHC  RA 4  RV 21/0 (3)  PA 16/9 (13)  PCWP 4  PA sat 63%  Ao sat 95%  FiCO 4.9  FiCI 2.53  No intervention was performed.

## 2021-02-23 NOTE — PROGRESS NOTE ADULT - ATTENDING COMMENTS
Pt is a 61 y/o man c sciatica, arthritis, psoriasis who p/w chest pain and dyspnea found to be hypoxic with severe pulmonary edema.  Presentation c/w ACS (late presenting STEMI?) with also SIRS.    Pt less lethargic this morning and more conversant.    TTE: EF 25%, apical severe hypokinesis    afeb, 111/57, HR 80, 17, 100%  HFNC 50L/40%    I/O -770    Mathieu 0.67  BNP 9232  CRP 31--> 15    D-dimer 464    WBC 22 --> 12  Hgb 12.1  Plt 727 --> 912    Na 134  Cl 89  Cr 0.92 --> 1.01    CXR: much improved, less pulm edema    - pt with ACS and acute systolic CHF, ant MI  - agree to cont asa, brillanta & heparin in pt  - pt for cath but when infectious etiology sorted out  - cont lipitor  - hold ace/b-blocker for now  - pt appeared quite lethargic today, ordered stat ABG and pt alkalemic  - started diamox in for alkalemia  - pt with lekocytosis, thrombocytosis with increased inflammatory markers  - pt COVID -ve * >5  - cont abx for CAP  - cont HF NC for acute hypoxemic resp failure . Pt is a 59 y/o man c sciatica, arthritis, psoriasis who p/w chest pain and dyspnea found to be hypoxic with severe pulmonary edema.  Presentation c/w ACS (late presenting STEMI?) with also SIRS.    Pt less lethargic this morning and more conversant.    TTE: EF 25%, apical severe hypokinesis    afeb, 111/57, HR 80, 17, 100%  HFNC 50L/40%    I/O -770    Mathieu 0.67  BNP 9232  CRP 31--> 15    D-dimer 464    WBC 22 --> 12  Hgb 12.1  Plt 727 --> 912    Na 134  Cl 89  Cr 0.92 --> 1.01    CXR: much improved, less pulm edema    - pt with ACS and acute systolic CHF, ant MI  - agree to cont asa, brillanta & heparin in pt  - pt for cath but when infectious etiology sorted out  - consider cath today  - cont lipitor  - hold ace/b-blocker for now  - cont diamox for alkalemia  - pt with lekocytosis, thrombocytosis with increased inflammatory markers, and markers have decreased  - pt COVID -ve * >5  - cont abx for CAP * 7 days  - cont HF NC for acute hypoxemic resp failure, wean O2

## 2021-02-23 NOTE — PROGRESS NOTE ADULT - ASSESSMENT
Patient is a 61yo M PMH chronic low back pain with sciatica, arthritis, psoriasis presenting with 1.5 weeks of shortness of breath and chest pain. Found to be hypoxic and have significant pulmonary edema with new onset HF (bedside echo EF40%), b/l lung opacities with concern for PNA (pt meeting 3/4 SIRS criteria) and late presenting STEMI (trops 0.67, EKG with septal q waves and ST elevations). In the ED, pt was Aspirin/Plavix loaded and started on a Heparin gtt. Patient will be admitted to the CCU for further management.     Neuro  AAOx3     Cardiovascular   #ACS   Presenting with diffuse chest pain. Trops 0.67 (repeat 0.52). EKG with septal q waves and ST elevation c/f late presenting STEMI. No known CAD. S/p Aspirin 325mg, Plavix 600mg, Heparin bolus. TTE  this admission w/ Severely reduced left ventricular systolic function and regional wall motion abnormalities consistent with ischemic heart disease. LV EF 24%.  -s/p hep bolus. ptt subtherapeutic hep increased from 18.5 to 20 (goal 53-73)  PLAN  -c/w Aspirin 81mg  -dc Plavix 75mg, plan for Brilinta 180mg tonight at 9pm and 90m g bid starting 2/22 AM  -c/w Atorvastatin 80mg  -c/w heparin gtt  -f/u q6 PTT (goal 53-73)  -Plan for cath once medically optimized    #New onset CHF   Presenting with SOB. Pt found to be hypoxic and CXR showing fluid overload. BNP 9234. S/p Lasix 60mg in ED. Likely in setting of ACS. Pt not responding appropriately to 60 IV Lasix x 2 requiring 5mg metolazone, 2mg Bumex STAT then 1mg/hr Bumex infusion.   -bumex dc'd (2/22) pt w/ improved volume status. AM of 2/22 pt found to be very lethargic. ABG showed pH of 7.54 Bicarb of 35 from 26 on admission.  PLAN  -cont to hold further diuresis  -c/w acetazolamide 250mg IVPB x2 w/ repeat ABG  -Currently on HFNC, BIPAP at bedside  -f/u CMP, replete lytes and monitor LFTs to assess for organ perfusion    Pulmonary   #Acute hypoxic respiratory failure   On admission hypoxic to 87%. Improvement to 93% on 3L. Likely 2/2 PNA (COVID vs bacterial) and fluid overloaded in setting of ACS w/ c/f for new onset HF. S/p Solumedrol 125mg, Duoneb x 1.   -Supplemental oxygen prn. Pt w. inc O2 NC increased from 3 to 6L sating in low 90's, w/ increased WOB requiring Bumex drip.  -S/p Solumedrol 125mg   -bumex dc'd (2/22)  PLAN  -Duonebs q6 prn  -Management for PNA below  -Currently on HFNC, BIPAP at bedside  -f/u CMP, replete lytes and monitor LFTs to assess for organ perfusion    #PNA, CAP vs COVID  Management as below     ID  #Sepsis 2/2 CAP   Pt meeting 3/4 SIRS criteria (tachycardic, tachypneic, leukocytosis). Was started on CAP tx at urgent care. CXR showing pulmonary edema with suspected b/l opacities. Procalcitonin 3.36. s/p Azithromycin 500mg and Ceftriaxone 1g in ED. Pt initially dc'd abx due to low suspicion for infection w/ SIRS + leukocytosis presumed to be 2/2 to CHF exacerbation.  -Pt spiked new fever of 103.6 rectal, tachycardic to 131 and tachypneic to 26. Pt restarted on vanc + zosyn. w/ source still likely to be PNA  -pt w/ increasing white count   -UA negative, Urine strep and legi negative    -Patient also COVID PCR neg x3 but positive for COVID antibodies. Patient w/ high fevers, increased WBC however hemodynamically stable s/p bumex gtt which increases suspicion for COVID PNA vs less likely sepsis 2/2 bacterial infection given patient maintaining pressures i/s/o diuresis (would expect patient to go into septic shock if bacterial PNA)  PLAN  -UA (2/29) neg  -BCx (2/20 AM and PM) NGTD  -MRSA swab neg -> vanc dc'd  -Lactate normalized  -C/w zosyn 4.5g q6h x7 days (2/20-2/26)  -F/u sputum cx: NGTD  -Will consider deescalating or discontinuing ABX if no culture data returns  -f/u ID + epidemiology for pt to be transferred back to non-COVID CCU    GI  NABILA    Renal   #Metabolic alkalosis   pt w/ increased lethargy noted on AM of 2/22. ABG at the time showed pH of 7.53 and Bicarb of 35 from 26 on admission, likely 2/2 contraction alkalosis from aggressive diuresis  PLAN  -hold bumex -  dc'd (2/22)  -acetazolamide 250mg IVPB x2 w/ repeat ABG   -monitor mental status    Endo  #Hyperglycemia   Glucose 124 on admission. No known h/o DM or Pre-DM   -HgbA1c wnl  PLAN  -c/w ISS     Heme   #Anemia   Found to have an Hgb of 11.6. Per HIE labs, baseline Hgb is 15.   -iron studies c/w anemia of chronic disease  PLAN  -in setting of sepsis will hold iron tx for now   -Maintain active T&S    #Thrombocytosis  -Plts 593, likely reactive in setting of ACS and PNA although per HIE labs Plts 412 in 1/2020.   PLAN  -Monitor CBC     Derm   #Psoriasis   Not on any medications     MSK   #Arthritis   -Tylenol 650mg prn     Allergy  #Seasonal allergies   -C/w home Claritin     F: none  E: replete K <4, Mg <2  N: DASH/TLC, fluid restriction    GI Ppx: None   DVT Ppx: Hep gtt  Code status: FULL   Dispo: CCU    Patient is a 59yo M PMH chronic low back pain with sciatica, arthritis, psoriasis presenting with 1.5 weeks of shortness of breath and chest pain. Found to be hypoxic and have significant pulmonary edema with new onset HF (bedside echo EF40%), b/l lung opacities with concern for PNA (pt meeting 3/4 SIRS criteria) and late presenting STEMI (trops 0.67, EKG with septal q waves and ST elevations). In the ED, pt was Aspirin/Plavix loaded and started on a Heparin gtt. Patient will be admitted to the CCU for further management.     Neuro  AAOx3     Cardiovascular   #ACS   Presenting with diffuse chest pain. Trops 0.67 (repeat 0.52). EKG with septal q waves and ST elevation c/f late presenting STEMI. No known CAD. S/p Aspirin 325mg, Plavix 600mg, Heparin bolus. TTE  this admission w/ Severely reduced left ventricular systolic function and regional wall motion abnormalities consistent with ischemic heart disease. LV EF 24%.  -s/p hep bolus. ptt subtherapeutic hep increased from 18.5 to 20 (goal 53-73)  PLAN  -c/w Aspirin 81mg  -dc Plavix 75mg, plan for Brilinta 180mg tonight at 9pm and 90m g bid starting 2/22 AM  -c/w Atorvastatin 80mg  -c/w heparin gtt  -PTT therapeutic x3 @20  -Plan for cath ?     #New onset CHF   Presenting with SOB. Pt found to be hypoxic and CXR showing fluid overload. BNP 9234. S/p Lasix 60mg in ED. Likely in setting of ACS. Pt not responding appropriately to 60 IV Lasix x 2 requiring 5mg metolazone, 2mg Bumex STAT then 1mg/hr Bumex infusion.   -bumex dc'd (2/22) pt w/ improved volume status. AM of 2/22 pt found to be very lethargic. ABG showed pH of 7.54 Bicarb of 35 from 26 on admission.  PLAN  -cont to hold further diuresis  -c/w acetazolamide 250mg IVPB x2 ABG unchanged. mentating well today. f/u AM ABG  -Currently on HFNC, BIPAP at bedside  -f/u CMP, replete lytes and monitor LFTs to assess for organ perfusion    Pulmonary   #Acute hypoxic respiratory failure   On admission hypoxic to 87%. Improvement to 93% on 3L. Likely 2/2 PNA (COVID vs bacterial) and fluid overloaded in setting of ACS w/ c/f for new onset HF. S/p Solumedrol 125mg, Duoneb x 1.   -Supplemental oxygen prn. Pt w. inc O2 NC increased from 3 to 6L sating in low 90's, w/ increased WOB requiring Bumex drip.  -S/p Solumedrol 125mg   -bumex dc'd (2/22)  PLAN  -Duonebs q6 prn  -Management for PNA below  -Currently on HFNC, BIPAP at bedside  -f/u CMP, replete lytes and monitor LFTs to assess for organ perfusion  -transition to NC    #PNA, CAP vs COVID  Management as below     ID  #Sepsis 2/2 CAP   Pt meeting 3/4 SIRS criteria (tachycardic, tachypneic, leukocytosis). Was started on CAP tx at urgent care. CXR showing pulmonary edema with suspected b/l opacities. Procalcitonin 3.36. s/p Azithromycin 500mg and Ceftriaxone 1g in ED. Pt initially dc'd abx due to low suspicion for infection w/ SIRS + leukocytosis presumed to be 2/2 to CHF exacerbation.  -Pt spiked new fever of 103.6 rectal, tachycardic to 131 and tachypneic to 26. Pt restarted on vanc + zosyn. w/ source still likely to be PNA  -pt w/ increasing white count   -UA negative, Urine strep and legi negative    -Patient also COVID PCR neg x3 but positive for COVID antibodies. Patient w/ high fevers, increased WBC however hemodynamically stable s/p bumex gtt which increases suspicion for COVID PNA vs less likely sepsis 2/2 bacterial infection given patient maintaining pressures i/s/o diuresis (would expect patient to go into septic shock if bacterial PNA)  PLAN  -UA (2/29) neg  -BCx (2/20 AM and PM) NGTD  -MRSA swab neg -> vanc dc'd  -Lactate normalized  -C/w zosyn 4.5g q6h x7 days (2/20-2/26)  -F/u sputum cx: NGTD  -Will consider deescalating or discontinuing ABX if no culture data returns  -f/u ID + epidemiology for pt to be transferred back to non-COVID CCU    GI  #Mild transaminitis diff include COVID induced (so far covid neg) vs other hepatobiliary infection (_-murphys sign) vs medication induced (acetazolamide and bumex) vs hypoperfusion injury (pt aggressively diuresed)  -start  100cc/hr over 5 hours  -trend cmp    Renal   #Metabolic alkalosis   pt w/ increased lethargy noted on AM of 2/22. ABG at the time showed pH of 7.53 and Bicarb of 35 from 26 on admission, likely 2/2 contraction alkalosis from aggressive diuresis  PLAN  -holding duiresis,   -start  100cc/hr over 5 hours  -acetazolamide 250mg IVPB BID  -f/u repeat ABG  -monitor mental status    Endo  #Hyperglycemia   Glucose 124 on admission. No known h/o DM or Pre-DM   -HgbA1c wnl  PLAN  -c/w ISS     Heme   #Anemia   Found to have an Hgb of 11.6. Per HIE labs, baseline Hgb is 15.   -iron studies c/w anemia of chronic disease  PLAN  -in setting of sepsis will hold iron tx for now   -Maintain active T&S    #Thrombocytosis  -Plts 593, likely reactive in setting of ACS and PNA although per HIE labs Plts 412 in 1/2020.   PLAN  -Monitor CBC     Derm   #Psoriasis   Not on any medications     MSK   #Arthritis   -Tylenol 650mg prn     Allergy  #Seasonal allergies   -C/w home Claritin     F: none  E: replete K <4, Mg <2  N: DASH/TLC, fluid restriction    GI Ppx: None   DVT Ppx: Hep gtt  Code status: FULL   Dispo: CCU    Patient is a 59yo M PMH chronic low back pain with sciatica, arthritis, psoriasis presenting with 1.5 weeks of shortness of breath and chest pain. Found to be hypoxic and have significant pulmonary edema with new onset HF (bedside echo EF40%), b/l lung opacities with concern for PNA (pt meeting 3/4 SIRS criteria) and late presenting STEMI (trops 0.67, EKG with septal q waves and ST elevations). In the ED, pt was Aspirin/Plavix loaded and started on a Heparin gtt. Patient will be admitted to the CCU for further management.     Neuro  AAOx3     Cardiovascular   #ACS   Presenting with diffuse chest pain. Trops 0.67 (repeat 0.52). EKG with septal q waves and ST elevation c/f late presenting STEMI. No known CAD. S/p Aspirin 325mg, Plavix 600mg, Heparin bolus. TTE  this admission w/ Severely reduced left ventricular systolic function and regional wall motion abnormalities consistent with ischemic heart disease. LV EF 24%.  -s/p hep bolus. ptt subtherapeutic hep increased from 18.5 to 20 (goal 53-73)  PLAN  -c/w Aspirin 81mg  -dc Plavix 75mg, plan for Brilinta 180mg tonight at 9pm and 90m g bid starting 2/22 AM  -c/w Atorvastatin 80mg  -c/w heparin gtt  -PTT therapeutic x3 @20  -plan for CATH today    #New onset CHF   Presenting with SOB. Pt found to be hypoxic and CXR showing fluid overload. BNP 9234. S/p Lasix 60mg in ED. Likely in setting of ACS. Pt not responding appropriately to 60 IV Lasix x 2 requiring 5mg metolazone, 2mg Bumex STAT then 1mg/hr Bumex infusion.   -bumex dc'd (2/22) pt w/ improved volume status. AM of 2/22 pt found to be very lethargic. ABG showed pH of 7.54 Bicarb of 35 from 26 on admission.  PLAN  -cont to hold further diuresis  -s/p acetazolamide 250mg IVPB x2. ABG improved. mentating well today. d/c'd acetazolamide   -transition to NC  -f/u AM CMP    Pulmonary   #Acute hypoxic respiratory failure   On admission hypoxic to 87%. Improvement to 93% on 3L. Likely 2/2 PNA (COVID vs bacterial) and fluid overloaded in setting of ACS w/ c/f for new onset HF. S/p Solumedrol 125mg, Duoneb x 1.   -Supplemental oxygen prn. Pt w. inc O2 NC increased from 3 to 6L sating in low 90's, w/ increased WOB requiring Bumex drip.  -S/p Solumedrol 125mg   -bumex dc'd (2/22)  PLAN  -Duonebs q6 prn  -Management for PNA below  -Currently on HFNC, BIPAP at bedside  -f/u CMP, replete lytes and monitor LFTs to assess for organ perfusion  -transition to NC    #PNA, CAP vs COVID  Management as below     ID  #Sepsis 2/2 CAP   Pt meeting 3/4 SIRS criteria (tachycardic, tachypneic, leukocytosis). Was started on CAP tx at urgent care. CXR showing pulmonary edema with suspected b/l opacities. Procalcitonin 3.36. s/p Azithromycin 500mg and Ceftriaxone 1g in ED. Pt initially dc'd abx due to low suspicion for infection w/ SIRS + leukocytosis presumed to be 2/2 to CHF exacerbation.  -Pt spiked new fever of 103.6 rectal, tachycardic to 131 and tachypneic to 26. Pt restarted on vanc + zosyn. w/ source still likely to be PNA  -pt w/ increasing white count   -UA negative, Urine strep and legi negative    -Patient also COVID PCR neg x3 but positive for COVID antibodies. Patient w/ high fevers, increased WBC however hemodynamically stable s/p bumex gtt which increases suspicion for COVID PNA vs less likely sepsis 2/2 bacterial infection given patient maintaining pressures i/s/o diuresis (would expect patient to go into septic shock if bacterial PNA)  PLAN  -UA (2/29) neg  -BCx (2/20 AM and PM) NGTD  -MRSA swab neg -> vanc dc'd  -Lactate normalized  -C/w zosyn 4.5g q6h x7 days (2/20-2/26)  -F/u sputum cx: NGTD  -Will consider deescalating or discontinuing ABX if no culture data returns  -f/u ID + epidemiology for pt to be transferred back to non-COVID CCU    GI  #Mild transaminitis diff include COVID induced (so far covid neg) vs other hepatobiliary infection (_-murphys sign) vs medication induced (acetazolamide and bumex) vs hypoperfusion injury (pt aggressively diuresed)  -start  100cc/hr over 5 hours  -trend cmp    Renal   #Metabolic alkalosis   pt w/ increased lethargy noted on AM of 2/22. ABG at the time showed pH of 7.53 and Bicarb of 35 from 26 on admission, likely 2/2 contraction alkalosis from aggressive diuresis  PLAN  -holding diuresis   -s/p acetazolamide 250mg IVPB x2. ABG improved. mentating well today. d/c'd acetazolamide   -start  100cc/hr over 5 hours  -monitor mental status and I/O's    Endo  #Hyperglycemia   Glucose 124 on admission. No known h/o DM or Pre-DM   -HgbA1c wnl  PLAN  -c/w ISS     Heme   #Anemia   Found to have an Hgb of 11.6. Per HIE labs, baseline Hgb is 15.   -iron studies c/w anemia of chronic disease  PLAN  -in setting of sepsis will hold iron tx for now   -Maintain active T&S    #Thrombocytosis  -Plts 593, likely reactive in setting of ACS and PNA although per HIE labs Plts 412 in 1/2020.   PLAN  -Monitor CBC     Derm   #Psoriasis   Not on any medications     MSK   #Arthritis   -Tylenol 650mg prn     Allergy  #Seasonal allergies   -C/w home Claritin     F: none  E: replete K <4, Mg <2  N: DASH/TLC, fluid restriction    GI Ppx: None   DVT Ppx: Hep gtt  Code status: FULL   Dispo: CCU    Patient is a 61yo M PMH chronic low back pain with sciatica, arthritis, psoriasis presenting with 1.5 weeks of shortness of breath and chest pain. Found to be hypoxic and have significant pulmonary edema with new onset HF (bedside echo EF40%), b/l lung opacities with concern for PNA (pt meeting 3/4 SIRS criteria) and late presenting STEMI (trops 0.67, EKG with septal q waves and ST elevations). In the ED, pt was Aspirin/Plavix loaded and started on a Heparin gtt. Patient will be admitted to the CCU for further management.     Neuro  AAOx3     Cardiovascular   #ACS   Presenting with diffuse chest pain. Trops 0.67 (repeat 0.52). EKG with septal q waves and ST elevation c/f late presenting STEMI. No known CAD. S/p Aspirin 325mg, Plavix 600mg, Heparin bolus. TTE  this admission w/ Severely reduced left ventricular systolic function and regional wall motion abnormalities consistent with ischemic heart disease. LV EF 24%.  -s/p hep bolus. ptt subtherapeutic hep increased from 18.5 to 20 (goal 53-73)  PLAN  -c/w Aspirin 81mg  -dc Plavix 75mg, plan for Brilinta 180mg tonight at 9pm and 90m g bid starting 2/22 AM  -c/w Atorvastatin 80mg  -c/w heparin gtt  -PTT therapeutic x3 @20  -plan for CATH today    #New onset CHF   Presenting with SOB. Pt found to be hypoxic and CXR showing fluid overload. BNP 9234. S/p Lasix 60mg in ED. Likely in setting of ACS. Pt not responding appropriately to 60 IV Lasix x 2 requiring 5mg metolazone, 2mg Bumex STAT then 1mg/hr Bumex infusion.   -bumex dc'd (2/22) pt w/ improved volume status. AM of 2/22 pt found to be very lethargic. ABG showed pH of 7.54 Bicarb of 35 from 26 on admission.  PLAN  -cont to hold further diuresis  -s/p acetazolamide 250mg IVPB x2. ABG improved. mentating well today. d/c'd acetazolamide   -transition to NC  -f/u AM CMP    Pulmonary   #Acute hypoxic respiratory failure   On admission hypoxic to 87%. Improvement to 93% on 3L. Likely 2/2 PNA (COVID vs bacterial) and fluid overloaded in setting of ACS w/ c/f for new onset HF. S/p Solumedrol 125mg, Duoneb x 1.   -Supplemental oxygen prn. Pt w. inc O2 NC increased from 3 to 6L sating in low 90's, w/ increased WOB requiring Bumex drip.  -S/p Solumedrol 125mg   -bumex dc'd (2/22)  PLAN  -Duonebs q6 prn  -Management for PNA below  -Currently on HFNC, BIPAP at bedside  -f/u CMP, replete lytes and monitor LFTs to assess for organ perfusion  -transition to NC    #PNA, CAP vs COVID  Management as below     ID  #Sepsis 2/2 CAP   Pt meeting 3/4 SIRS criteria (tachycardic, tachypneic, leukocytosis). Was started on CAP tx at urgent care. CXR showing pulmonary edema with suspected b/l opacities. Procalcitonin 3.36. s/p Azithromycin 500mg and Ceftriaxone 1g in ED. Pt initially dc'd abx due to low suspicion for infection w/ SIRS + leukocytosis presumed to be 2/2 to CHF exacerbation.  -Pt spiked new fever of 103.6 rectal, tachycardic to 131 and tachypneic to 26. Pt restarted on vanc + zosyn. w/ source still likely to be PNA  -pt w/ increasing white count   -UA negative, Urine strep and legi negative    -Patient also COVID PCR neg x3 but positive for COVID antibodies. Patient w/ high fevers, increased WBC however hemodynamically stable s/p bumex gtt which increases suspicion for COVID PNA vs less likely sepsis 2/2 bacterial infection given patient maintaining pressures i/s/o diuresis (would expect patient to go into septic shock if bacterial PNA)  PLAN  -UA (2/29) neg  -BCx (2/20 AM and PM) NGTD  -MRSA swab neg -> vanc dc'd  -Lactate normalized  -C/w zosyn 4.5g q6h x7 days (2/20-2/26)  -F/u sputum cx: NGTD  -Will consider deescalating or discontinuing ABX if no culture data returns  -Per COVID consult team, pt w/ high suspicion for covid. will remain on COVID tele unit under CCU service     GI  #Mild transaminitis diff include COVID induced (so far covid neg) vs other hepatobiliary infection (NEG murphys sign, and not presenting w/ transaminitis in setting of fever) vs medication induced (acetazolamide and bumex) vs hypoperfusion injury (pt aggressively diuresed)  -start  100cc/hr over 5 hours  -trend cmp    Renal   #Metabolic alkalosis   pt w/ increased lethargy noted on AM of 2/22. ABG at the time showed pH of 7.53 and Bicarb of 35 from 26 on admission, likely 2/2 contraction alkalosis from aggressive diuresis  PLAN  -holding diuresis   -s/p acetazolamide 250mg IVPB x2. ABG improved. mentating well today. d/c'd acetazolamide   -start  100cc/hr over 5 hours  -monitor mental status and I/O's    Endo  #Hyperglycemia   Glucose 124 on admission. No known h/o DM or Pre-DM   -HgbA1c wnl  PLAN  -c/w ISS     Heme   #Anemia   Found to have an Hgb of 11.6. Per HIE labs, baseline Hgb is 15.   -iron studies c/w anemia of chronic disease  PLAN  -in setting of sepsis will hold iron tx for now   -Maintain active T&S    #Thrombocytosis  -Plts 593, likely reactive in setting of ACS and PNA although per HIE labs Plts 412 in 1/2020.   PLAN  -Monitor CBC     Derm   #Psoriasis   Not on any medications     MSK   #Arthritis   -Tylenol 650mg prn     Allergy  #Seasonal allergies   -C/w home Claritin     F: none  E: replete K <4, Mg <2  N: DASH/TLC, fluid restriction    GI Ppx: None   DVT Ppx: Hep gtt  Code status: FULL   Dispo: CCU    Patient is a 59yo M PMH chronic low back pain with sciatica, arthritis, psoriasis presenting with 1.5 weeks of shortness of breath and chest pain. Found to be hypoxic and have significant pulmonary edema with new onset HF (TTE EF 20-25%), b/l lung opacities with concern for PNA (pt meeting 3/4 SIRS criteria) and late presenting STEMI (trops 0.67, EKG with septal q waves and ST elevations). In the ED, pt was Aspirin/Plavix loaded and started on a Heparin gtt. Patient will be admitted to the CCU for further management.     Neuro  AAOx3     Cardiovascular   #ACS   Presenting with diffuse chest pain. Trops 0.67 (repeat 0.52). EKG with septal q waves and ST elevation c/f late presenting STEMI. No known CAD. S/p Aspirin 325mg, Plavix 600mg, Heparin bolus. TTE  this admission w/ Severely reduced left ventricular systolic function and regional wall motion abnormalities consistent with ischemic heart disease. LV EF 24%.  -s/p hep bolus. ptt subtherapeutic hep increased from 18.5 to 20 (goal 53-73)  PLAN  -c/w Aspirin 81mg  -dc Plavix 75mg, plan for Brilinta 180mg tonight at 9pm and 90m g bid starting 2/22 AM  -c/w Atorvastatin 80mg  -c/w heparin gtt  -PTT therapeutic x3 @20  -plan for CATH today    #New onset CHF   Presenting with SOB. Pt found to be hypoxic and CXR showing fluid overload. BNP 9234. S/p Lasix 60mg in ED. Likely in setting of ACS. Pt not responding appropriately to 60 IV Lasix x 2 requiring 5mg metolazone, 2mg Bumex STAT then 1mg/hr Bumex infusion.   -bumex dc'd (2/22) pt w/ improved volume status. AM of 2/22 pt found to be very lethargic. ABG showed pH of 7.54 Bicarb of 35 from 26 on admission.  PLAN  -cont to hold further diuresis  -s/p acetazolamide 250mg IVPB x2. ABG improved. mentating well today. d/c'd acetazolamide   -transition to NC  -f/u AM CMP    Pulmonary   #Acute hypoxic respiratory failure   On admission hypoxic to 87%. Improvement to 93% on 3L. Likely 2/2 PNA (COVID vs bacterial) and fluid overloaded in setting of ACS w/ c/f for new onset HF. S/p Solumedrol 125mg, Duoneb x 1.   -Supplemental oxygen prn. Pt w. inc O2 NC increased from 3 to 6L sating in low 90's, w/ increased WOB requiring Bumex drip.  -S/p Solumedrol 125mg   -bumex dc'd (2/22)  PLAN  -Duonebs q6 prn  -Management for PNA below  -Currently on HFNC, BIPAP at bedside  -f/u CMP, replete lytes and monitor LFTs to assess for organ perfusion  -transition to NC    #PNA, CAP vs COVID  Management as below     ID  #Sepsis 2/2 CAP   Pt meeting 3/4 SIRS criteria (tachycardic, tachypneic, leukocytosis). Was started on CAP tx at urgent care. CXR showing pulmonary edema with suspected b/l opacities. Procalcitonin 3.36. s/p Azithromycin 500mg and Ceftriaxone 1g in ED. Pt initially dc'd abx due to low suspicion for infection w/ SIRS + leukocytosis presumed to be 2/2 to CHF exacerbation.  -Pt spiked new fever of 103.6 rectal, tachycardic to 131 and tachypneic to 26. Pt restarted on vanc + zosyn. w/ source still likely to be PNA  -pt w/ increasing white count   -UA negative, Urine strep and legi negative    -Patient also COVID PCR neg x3 but positive for COVID antibodies. Patient w/ high fevers, increased WBC however hemodynamically stable s/p bumex gtt which increases suspicion for COVID PNA vs less likely sepsis 2/2 bacterial infection given patient maintaining pressures i/s/o diuresis (would expect patient to go into septic shock if bacterial PNA)  PLAN  -UA (2/29) neg  -BCx (2/20 AM and PM) NGTD  -MRSA swab neg -> vanc dc'd  -Lactate normalized  -C/w zosyn 4.5g q6h x7 days (2/20-2/26)  -F/u sputum cx: NGTD  -Will consider deescalating or discontinuing ABX if no culture data returns  -Per COVID consult team, pt w/ high suspicion for covid. will remain on COVID tele unit under CCU service     GI  #Mild transaminitis diff include COVID induced (so far covid neg) vs other hepatobiliary infection (NEG murphys sign, and not presenting w/ transaminitis in setting of fever) vs medication induced (acetazolamide and bumex) vs hypoperfusion injury (pt aggressively diuresed)  -start  100cc/hr over 5 hours  -trend cmp    Renal   #Metabolic alkalosis   pt w/ increased lethargy noted on AM of 2/22. ABG at the time showed pH of 7.53 and Bicarb of 35 from 26 on admission, likely 2/2 contraction alkalosis from aggressive diuresis  PLAN  -holding diuresis   -s/p acetazolamide 250mg IVPB x2. ABG improved. mentating well today. d/c'd acetazolamide   -start  100cc/hr over 5 hours  -monitor mental status and I/O's    Endo  #Hyperglycemia   Glucose 124 on admission. No known h/o DM or Pre-DM   -HgbA1c wnl  PLAN  -c/w ISS     Heme   #Anemia   Found to have an Hgb of 11.6. Per [facility]IE labs, baseline Hgb is 15.   -iron studies c/w anemia of chronic disease  PLAN  -in setting of sepsis will hold iron tx for now   -Maintain active T&S    #Thrombocytosis  -Plts 593, likely reactive in setting of ACS and PNA although per [facility] labs Plts 412 in 1/2020.   PLAN  -Monitor CBC     Derm   #Psoriasis   Not on any medications     MSK   #Arthritis   -Tylenol 650mg prn     Allergy  #Seasonal allergies   -C/w home Claritin     F: none  E: replete K <4, Mg <2  N: DASH/TLC, fluid restriction    GI Ppx: None   DVT Ppx: Hep gtt  Code status: FULL   Dispo: CCU

## 2021-02-23 NOTE — PROGRESS NOTE ADULT - SUBJECTIVE AND OBJECTIVE BOX
POST PROCEDURE NOTE    Procedures performed: coronary angiogram, LHC, RHC, [place] Todd insertion.  Indication for procedure: CHF low EF, NSTEMI.    Access site: R radial 6 Fr, closed with TR band. RIJ 8.5 Fr used for [place] Todd insertion was sutured on skin.    Coronary angiogram  LM: mild luminal irregularities.  LAD: prox 30% stenosis, mid 99% subtotal occlusion.  D1: ostial 50%  D2: ostial 90% stenosis.  LCX/OM: mild luminal irregularities.  RCA: prox 50% stenosis.    LV gram: EF 15%, anterior apical wall akinesis.   LVEDP 2mmHg  No LV-Ao gradient noted on pullback.    RHC  RA 4  RV 21/0 (3)  PA 16/9 (13)  PCWP 4  PA sat 63%  Ao sat 95%  FiCO 4.9  FiCI 2.53    No intervention was performed.    Recommendations  continue with sepsis management and hemodynamic monitoring.  Plan for staged Impella assisted PCI of mid LAD/D2 bifurcation on 02/26/21. POST PROCEDURE NOTE    Procedures performed: coronary angiogram, LHC, RHC, [place] Todd insertion.  Indication for procedure: CHF low EF, NSTEMI.    Access site: R radial 6 Fr, closed with TR band. RIJ 8.5 Fr used for [place] Todd insertion was sutured on skin.    Coronary angiogram  LM: mild luminal irregularities.  LAD: prox 30% stenosis, mid 99% subtotal occlusion.  D1: ostial 50%  D2: ostial 90% stenosis.  LCX/OM: mild luminal irregularities.  RCA: prox 50% stenosis.    LV gram: EF 15%, anterior apical wall akinesis.   LVEDP 2mmHg  No LV-Ao gradient noted on pullback.    RHC  RA 4  RV 21/0 (3)  PA 16/9 (13)  PCWP 4  PA sat 63%  Ao sat 95%  FiCO 4.9  FiCI 2.53    No intervention was performed.    Recommendations  continue with sepsis management and hemodynamic monitoring.  Plan for staged Impella assisted PCI of mid LAD/D2 bifurcation after viability study/inflammatory markers improve

## 2021-02-23 NOTE — DISCHARGE NOTE PROVIDER - CARE PROVIDER_API CALL
Octavio Osborn (MD)  Gastroenterology; Internal Medicine  [address] [place] [address]  Phone: [phone]  Fax: [phone]  Established Patient  Follow Up Time: 2 weeks   Octavio Osborn)  Gastroenterology; Internal Medicine  [address] [place] [address]  Phone: [phone]  Fax: [phone]  Established Patient  Follow Up Time: 2 weeks    DIMITRI GHOTRA  Cardiology  Phone: [phone]  Fax: [phone]  Scheduled Appointment: 03/04/2021 01:00 PM   Octavio Osborn)  Gastroenterology; Internal Medicine  [address] [place] [address]  Phone: [phone]  Fax: [phone]  Established Patient  Follow Up Time: 2 weeks    DIMITRI GHOTRA  Cardiology  Phone: [phone]  Fax: [phone]  Scheduled Appointment: 03/04/2021 01:00 PM    Trixie Farmer)  Cardiology; Cardiovascular Disease  [address] [place] [address]  Phone: [phone]  Fax: [phone]  Follow Up Time:

## 2021-02-23 NOTE — DISCHARGE NOTE PROVIDER - NSDCMRMEDTOKEN_GEN_ALL_CORE_FT
ibuprofen 600 mg oral tablet: 1 tab(s) orally every 6 hours, As Needed  loratadine 10 mg oral tablet: 1 tab(s) orally once a day  Tylenol 500 mg oral tablet: 2 tab(s) orally every 6 hours, As Needed   aspirin 81 mg oral delayed release tablet: 1 tab(s) orally every 24 hours  atorvastatin 80 mg oral tablet: 1 tab(s) orally once a day (at bedtime)  loratadine 10 mg oral tablet: 1 tab(s) orally once a day  metoprolol succinate 25 mg oral tablet, extended release: 1 tab(s) orally once a day  ticagrelor 90 mg oral tablet: 1 tab(s) orally every 12 hours  valsartan 40 mg oral tablet: 1 tab(s) orally every 24 hours   aspirin 81 mg oral delayed release tablet: 1 tab(s) orally every 24 hours  atorvastatin 80 mg oral tablet: 1 tab(s) orally once a day (at bedtime)  benzonatate 100 mg oral capsule: 1 cap(s) orally 3 times a day, As needed, Cough  loratadine 10 mg oral tablet: 1 tab(s) orally once a day  menthol-benzocaine 3.6 mg-15 mg mucous membrane lozenge: 1 tab(s) mucous membrane 3 to 4 times a day, As Needed  metoprolol succinate 25 mg oral tablet, extended release: 1 tab(s) orally once a day  ticagrelor 90 mg oral tablet: 1 tab(s) orally every 12 hours  valsartan 40 mg oral tablet: 1 tab(s) orally every 24 hours   aspirin 81 mg oral delayed release tablet: 1 tab(s) orally every 24 hours  atorvastatin 80 mg oral tablet: 1 tab(s) orally once a day (at bedtime)  benzonatate 100 mg oral capsule: 1 cap(s) orally 3 times a day, As needed, Cough  loratadine 10 mg oral tablet: 1 tab(s) orally once a day  menthol-benzocaine 3.6 mg-15 mg mucous membrane lozenge: 1 tab(s) mucous membrane 3 to 4 times a day, As Needed  metoprolol succinate 25 mg oral tablet, extended release: 1 tab(s) orally once a day  ticagrelor 90 mg oral tablet: 1 tab(s) orally every 12 hours starting night of 2/27  valsartan 40 mg oral tablet: 1 tab(s) orally every 24 hours

## 2021-02-23 NOTE — DISCHARGE NOTE PROVIDER - NSDCFUADDAPPT_GEN_ALL_CORE_FT
You appointment with your cardiologist Dr Mary Plaza is scheduled for   3/4/21 @ 1pm  [address]  [phone]    also please call to schedule an appointment with your PCP to be seen within the next 1-2 weeks.  you can either schedule an appointment with your own PCP Dr Osborn  or you can call our [facility] to schedule an appointment   Address: [address], [place] [address]  Phone: [phone] You appointment with your cardiologist Dr Mary Plaza is scheduled for   3/4/21 @ 1pm  [address]  [phone]    Also please call to schedule an appointment with your PCP to be seen within the next 1-2 weeks. You can either schedule an appointment with your own PCP Dr Osborn  or you can call our [facility] at [address] [place] [address] to schedule an appointment   Address: [address] [place], [place] [address], [place] [address]  Phone: [phone] You appointment with your cardiologist Mary Plaza is scheduled for   3/4/21 @ 1pm  [address]  [phone]    Also please call to schedule an appointment with your PCP to be seen within the next 1-2 weeks. You can either schedule an appointment with your own PCP Dr Osborn  or you can call our [facility] at [address] [place] [address] to schedule an appointment   Address: [address] [place], [place] [address], [place] [address]  Phone: [phone]

## 2021-02-24 LAB
ALBUMIN SERPL ELPH-MCNC: 2.8 G/DL — LOW (ref 3.3–5)
ALP SERPL-CCNC: 138 U/L — HIGH (ref 40–120)
ALT FLD-CCNC: 59 U/L — HIGH (ref 10–45)
ANION GAP SERPL CALC-SCNC: 12 MMOL/L — SIGNIFICANT CHANGE UP (ref 5–17)
APTT BLD: 76.8 SEC — HIGH (ref 27.5–35.5)
APTT BLD: 89.7 SEC — HIGH (ref 27.5–35.5)
AST SERPL-CCNC: 32 U/L — SIGNIFICANT CHANGE UP (ref 10–40)
BASE EXCESS BLDA CALC-SCNC: SIGNIFICANT CHANGE UP MMOL/L (ref -2–3)
BASOPHILS # BLD AUTO: 0.09 K/UL — SIGNIFICANT CHANGE UP (ref 0–0.2)
BASOPHILS NFR BLD AUTO: 0.6 % — SIGNIFICANT CHANGE UP (ref 0–2)
BILIRUB SERPL-MCNC: 0.5 MG/DL — SIGNIFICANT CHANGE UP (ref 0.2–1.2)
BUN SERPL-MCNC: 28 MG/DL — HIGH (ref 7–23)
CALCIUM SERPL-MCNC: 9.3 MG/DL — SIGNIFICANT CHANGE UP (ref 8.4–10.5)
CHLORIDE SERPL-SCNC: 100 MMOL/L — SIGNIFICANT CHANGE UP (ref 96–108)
CO2 SERPL-SCNC: 21 MMOL/L — LOW (ref 22–31)
CREAT SERPL-MCNC: 0.84 MG/DL — SIGNIFICANT CHANGE UP (ref 0.5–1.3)
CRP SERPL-MCNC: 5.62 MG/DL — HIGH (ref 0–0.4)
D DIMER BLD IA.RAPID-MCNC: 873 NG/ML DDU — HIGH
EOSINOPHIL # BLD AUTO: 0.37 K/UL — SIGNIFICANT CHANGE UP (ref 0–0.5)
EOSINOPHIL NFR BLD AUTO: 2.7 % — SIGNIFICANT CHANGE UP (ref 0–6)
FERRITIN SERPL-MCNC: 1220 NG/ML — HIGH (ref 30–400)
GAS PNL BLDA: SIGNIFICANT CHANGE UP
GAS PNL BLDA: SIGNIFICANT CHANGE UP
GLUCOSE BLDC GLUCOMTR-MCNC: 106 MG/DL — HIGH (ref 70–99)
GLUCOSE BLDC GLUCOMTR-MCNC: 125 MG/DL — HIGH (ref 70–99)
GLUCOSE SERPL-MCNC: 115 MG/DL — HIGH (ref 70–99)
HBV CORE IGM SER-ACNC: SIGNIFICANT CHANGE UP
HBV SURFACE AB SER-ACNC: SIGNIFICANT CHANGE UP
HBV SURFACE AB SER-ACNC: SIGNIFICANT CHANGE UP
HBV SURFACE AG SER-ACNC: SIGNIFICANT CHANGE UP
HCO3 BLDA-SCNC: SIGNIFICANT CHANGE UP MMOL/L (ref 21–28)
HCT VFR BLD CALC: 33.7 % — LOW (ref 39–50)
HGB BLD-MCNC: 11 G/DL — LOW (ref 13–17)
IMM GRANULOCYTES NFR BLD AUTO: 2 % — HIGH (ref 0–1.5)
LYMPHOCYTES # BLD AUTO: 1.63 K/UL — SIGNIFICANT CHANGE UP (ref 1–3.3)
LYMPHOCYTES # BLD AUTO: 11.7 % — LOW (ref 13–44)
MAGNESIUM SERPL-MCNC: 1.8 MG/DL — SIGNIFICANT CHANGE UP (ref 1.6–2.6)
MCHC RBC-ENTMCNC: 29.8 PG — SIGNIFICANT CHANGE UP (ref 27–34)
MCHC RBC-ENTMCNC: 32.6 GM/DL — SIGNIFICANT CHANGE UP (ref 32–36)
MCV RBC AUTO: 91.3 FL — SIGNIFICANT CHANGE UP (ref 80–100)
MONOCYTES # BLD AUTO: 1.09 K/UL — HIGH (ref 0–0.9)
MONOCYTES NFR BLD AUTO: 7.8 % — SIGNIFICANT CHANGE UP (ref 2–14)
NEUTROPHILS # BLD AUTO: 10.43 K/UL — HIGH (ref 1.8–7.4)
NEUTROPHILS NFR BLD AUTO: 75.2 % — SIGNIFICANT CHANGE UP (ref 43–77)
NRBC # BLD: 0 /100 WBCS — SIGNIFICANT CHANGE UP (ref 0–0)
PCO2 BLDA: SIGNIFICANT CHANGE UP MMHG (ref 35–48)
PH BLDA: 7.43 — SIGNIFICANT CHANGE UP (ref 7.35–7.45)
PHOSPHATE SERPL-MCNC: 3 MG/DL — SIGNIFICANT CHANGE UP (ref 2.5–4.5)
PLATELET # BLD AUTO: 906 K/UL — HIGH (ref 150–400)
PO2 BLDA: 125 MMHG — HIGH (ref 83–108)
POTASSIUM SERPL-MCNC: 3.9 MMOL/L — SIGNIFICANT CHANGE UP (ref 3.5–5.3)
POTASSIUM SERPL-SCNC: 3.9 MMOL/L — SIGNIFICANT CHANGE UP (ref 3.5–5.3)
PROT SERPL-MCNC: 6.8 G/DL — SIGNIFICANT CHANGE UP (ref 6–8.3)
RAPID RVP RESULT: SIGNIFICANT CHANGE UP
RBC # BLD: 3.69 M/UL — LOW (ref 4.2–5.8)
RBC # FLD: 13.2 % — SIGNIFICANT CHANGE UP (ref 10.3–14.5)
SAO2 % BLDA: 99 % — SIGNIFICANT CHANGE UP (ref 95–100)
SARS-COV-2 RNA SPEC QL NAA+PROBE: SIGNIFICANT CHANGE UP
SODIUM SERPL-SCNC: 133 MMOL/L — LOW (ref 135–145)
WBC # BLD: 13.89 K/UL — HIGH (ref 3.8–10.5)
WBC # FLD AUTO: 13.89 K/UL — HIGH (ref 3.8–10.5)

## 2021-02-24 PROCEDURE — 75561 CARDIAC MRI FOR MORPH W/DYE: CPT | Mod: 26

## 2021-02-24 PROCEDURE — 99291 CRITICAL CARE FIRST HOUR: CPT

## 2021-02-24 PROCEDURE — 99292 CRITICAL CARE ADDL 30 MIN: CPT

## 2021-02-24 PROCEDURE — 71045 X-RAY EXAM CHEST 1 VIEW: CPT | Mod: 26

## 2021-02-24 RX ORDER — HEPARIN SODIUM 5000 [USP'U]/ML
1900 INJECTION INTRAVENOUS; SUBCUTANEOUS
Qty: 25000 | Refills: 0 | Status: DISCONTINUED | OUTPATIENT
Start: 2021-02-24 | End: 2021-02-25

## 2021-02-24 RX ORDER — POTASSIUM CHLORIDE 20 MEQ
10 PACKET (EA) ORAL ONCE
Refills: 0 | Status: COMPLETED | OUTPATIENT
Start: 2021-02-24 | End: 2021-02-24

## 2021-02-24 RX ORDER — MAGNESIUM SULFATE 500 MG/ML
1 VIAL (ML) INJECTION ONCE
Refills: 0 | Status: COMPLETED | OUTPATIENT
Start: 2021-02-24 | End: 2021-02-24

## 2021-02-24 RX ADMIN — PIPERACILLIN AND TAZOBACTAM 200 GRAM(S): 4; .5 INJECTION, POWDER, LYOPHILIZED, FOR SOLUTION INTRAVENOUS at 10:45

## 2021-02-24 RX ADMIN — Medication 650 MILLIGRAM(S): at 14:26

## 2021-02-24 RX ADMIN — Medication 10 MILLIEQUIVALENT(S): at 07:42

## 2021-02-24 RX ADMIN — CHLORHEXIDINE GLUCONATE 1 APPLICATION(S): 213 SOLUTION TOPICAL at 05:33

## 2021-02-24 RX ADMIN — Medication 200 MILLIGRAM(S): at 07:52

## 2021-02-24 RX ADMIN — Medication 81 MILLIGRAM(S): at 05:33

## 2021-02-24 RX ADMIN — PIPERACILLIN AND TAZOBACTAM 200 GRAM(S): 4; .5 INJECTION, POWDER, LYOPHILIZED, FOR SOLUTION INTRAVENOUS at 16:05

## 2021-02-24 RX ADMIN — Medication 650 MILLIGRAM(S): at 07:42

## 2021-02-24 RX ADMIN — HEPARIN SODIUM 19 UNIT(S)/HR: 5000 INJECTION INTRAVENOUS; SUBCUTANEOUS at 06:30

## 2021-02-24 RX ADMIN — TICAGRELOR 90 MILLIGRAM(S): 90 TABLET ORAL at 10:45

## 2021-02-24 RX ADMIN — Medication 100 GRAM(S): at 07:19

## 2021-02-24 RX ADMIN — Medication 200 MILLIGRAM(S): at 14:26

## 2021-02-24 RX ADMIN — PIPERACILLIN AND TAZOBACTAM 200 GRAM(S): 4; .5 INJECTION, POWDER, LYOPHILIZED, FOR SOLUTION INTRAVENOUS at 03:20

## 2021-02-24 RX ADMIN — HEPARIN SODIUM 16 UNIT(S)/HR: 5000 INJECTION INTRAVENOUS; SUBCUTANEOUS at 14:27

## 2021-02-24 NOTE — PROGRESS NOTE ADULT - ASSESSMENT
Patient is a 59yo M PMH chronic low back pain with sciatica, arthritis, psoriasis presenting with 1.5 weeks of shortness of breath and chest pain. Found to be hypoxic and have significant pulmonary edema with new onset HF (TTE EF 20-25%), b/l lung opacities with concern for PNA (pt meeting 3/4 SIRS criteria) and late presenting STEMI (trops 0.67, EKG with septal q waves and ST elevations). In the ED, pt was Aspirin/Plavix loaded and started on a Heparin gtt. Patient will be admitted to the CCU for further management.     Neuro  AAOx3     Cardiovascular   #ACS   Presenting with diffuse chest pain. Trops 0.67 (repeat 0.52). EKG with septal q waves and ST elevation c/f late presenting STEMI. No known CAD. S/p Aspirin 325mg, Plavix 600mg, Heparin bolus. TTE  this admission w/ Severely reduced left ventricular systolic function and regional wall motion abnormalities consistent with ischemic heart disease. LV EF 24%.  -s/p hep bolus. ptt subtherapeutic hep increased from 18.5 to 20 (goal 53-73)  s/p coronary angiogram 2/23, C, [facility], [place] Todd insertion (RIJ), revealing mid LAD 99% subtotal occlusion, LAD D2: ostial 90% stenosis. LV gram: EF 15%, anterior apical wall akinesis.   PLAN  -c/w Aspirin 81mg  -dc Plavix 75mg, plan for Brilinta 180mg tonight at 9pm and 90m g bid starting 2/22 AM  -c/w Atorvastatin 80mg  -c/w heparin gtt   - Plan for staged Impella assisted PCI of mid LAD/D2 bifurcation after viability study/inflammatory markers improve    #New onset CHF   Presenting with SOB. Pt found to be hypoxic and CXR showing fluid overload. BNP 9234. S/p Lasix 60mg in ED. Likely in setting of ACS. Pt not responding appropriately to 60 IV Lasix x 2 requiring 5mg metolazone, 2mg Bumex STAT then 1mg/hr Bumex infusion.   -bumex dc'd (2/22) pt w/ improved volume status. AM of 2/22 pt found to be very lethargic. ABG showed pH of 7.54 Bicarb of 35 from 26 on admission.  -s/p coronary angiogram w/ placement of swan todd cath (RIJ) to monitor CVP - (goal pressure bet 4-5)- 2/23  PLAN  -cont to hold further diuresis, IVF as needed  -s/p acetazolamide 250mg IVPB x2. ABG improved. mentating well today. d/c'd acetazolamide   -sating well on RM air  -trend CMP    Pulmonary   #Acute hypoxic respiratory failure - RESOLVED  On admission hypoxic to 87%. Improvement to 93% on 3L. Likely 2/2 PNA (COVID vs bacterial) and fluid overloaded in setting of ACS w/ c/f for new onset HF. S/p Solumedrol 125mg, Duoneb x 1.   -Supplemental oxygen prn. Pt w. inc O2 NC increased from 3 to 6L sating in low 90's, w/ increased WOB requiring Bumex drip.  -S/p Solumedrol 125mg   -bumex dc'd (2/22)  PLAN  -Duonebs q6 prn  -Management for PNA below  -trend CMP, replete lytes and monitor LFTs to assess for organ perfusion  -sating well on RM air    #PNA, CAP vs COVID  Management as below     ID  #Sepsis 2/2 CAP   Pt meeting 3/4 SIRS criteria (tachycardic, tachypneic, leukocytosis). Was started on CAP tx at urgent care. CXR showing pulmonary edema with suspected b/l opacities. Procalcitonin 3.36. s/p Azithromycin 500mg and Ceftriaxone 1g in ED. Pt initially dc'd abx due to low suspicion for infection w/ SIRS + leukocytosis presumed to be 2/2 to CHF exacerbation.  -Pt spiked new fever of 103.6 rectal, tachycardic to 131 and tachypneic to 26. Pt restarted on vanc + zosyn. w/ source still likely to be PNA  -pt w/ increasing white count   -UA negative, Urine strep and legi negative    -Patient also COVID PCR neg x3 but positive for COVID antibodies. Patient w/ high fevers, increased WBC however hemodynamically stable s/p bumex gtt which increases suspicion for COVID PNA vs less likely sepsis 2/2 bacterial infection given patient maintaining pressures i/s/o diuresis (would expect patient to go into septic shock if bacterial PNA)  PLAN  -UA (2/29) neg  -BCx (2/20 AM and PM) NGTD  -sputum cultures growing Melissa Dubliniensis (rare opportunistic yeast). Pt has never been HIV tested. states he has not been sexually active for 10+ years, denies hx of intercourse w/ men. denies hx of IV drug use. HIV screen nonreactive. HIV neg.   -pt no longer spiking fevers, down trending WBC on zosyn, no antifungals given at this time.   -MRSA swab neg -> vanc dc'd  -Lactate normalized  PLAN  -C/w zosyn 4.5g q6h x7 days (2/20-2/26)  -Will consider deescalating or discontinuing ABX if no culture data returns  -Per COVID consult team, pt w/ high suspicion for covid. will remain on COVID tele unit under CCU service     GI  #Mild transaminitis diff include COVID induced (so far covid neg) vs other hepatobiliary infection (NEG murphys sign, and not presenting w/ transaminitis in setting of fever) vs medication induced (acetazolamide and bumex) vs hypoperfusion injury (pt aggressively diuresed)  -improving s/p IVF  -trend cmp, cont to monitor    Renal   #Metabolic alkalosis -RESOLVED  pt w/ increased lethargy noted on AM of 2/22. ABG at the time showed pH of 7.53 and Bicarb of 35 from 26 on admission, likely 2/2 contraction alkalosis from aggressive diuresis  PLAN  -holding diuresis   -s/p acetazolamide 250mg IVPB x2. ABG improved. mentating well today. d/c'd acetazolamide   -s/p IVF, ABG improved  -monitor mental status and I/O's    Endo  #Hyperglycemia   Glucose 124 on admission. No known h/o DM or Pre-DM   -HgbA1c wnl  PLAN  -c/w ISS     Heme   #Anemia   Found to have an Hgb of 11.6. Per [facility]IE labs, baseline Hgb is 15.   -iron studies c/w anemia of chronic disease  PLAN  -in setting of sepsis will hold iron tx for now   -Maintain active T&S    #Thrombocytosis  -Plts 593, likely reactive in setting of ACS and PNA although per [facility]IE labs Plts 412 in 1/2020.   PLAN  -Monitor CBC     Derm   #Psoriasis   Not on any medications     MSK   #Arthritis   -Tylenol 650mg prn     Allergy  #Seasonal allergies   -C/w home Claritin     F: none  E: replete K <4, Mg <2  N: DASH/TLC, fluid restriction    GI Ppx: None   DVT Ppx: Hep gtt  Code status: FULL   Dispo: CCU    Patient is a 59yo M PMH chronic low back pain with sciatica, arthritis, psoriasis presenting with 1.5 weeks of shortness of breath and chest pain. Found to be hypoxic and have significant pulmonary edema with new onset HF (TTE EF 20-25%), b/l lung opacities with concern for PNA (pt meeting 3/4 SIRS criteria) and late presenting STEMI (trops 0.67, EKG with septal q waves and ST elevations). In the ED, pt was Aspirin/Plavix loaded and started on a Heparin gtt. Patient will be admitted to the CCU for further management.     Neuro  AAOx3     Cardiovascular   #ACS   Presenting with diffuse chest pain. Trops 0.67 (repeat 0.52). EKG with septal q waves and ST elevation c/f late presenting STEMI. No known CAD. S/p Aspirin 325mg, Plavix 600mg, Heparin bolus. TTE  this admission w/ Severely reduced left ventricular systolic function and regional wall motion abnormalities consistent with ischemic heart disease. LV EF 24%.  -s/p hep bolus. ptt subtherapeutic hep increased from 18.5 to 20 (goal 53-73)  s/p coronary angiogram 2/23, [facility], C, [place] Todd insertion (RIJ). Cath revealing mid LAD 99% subtotal occlusion, LAD D2: ostial 90% stenosis. LV gram: EF 15%, anterior apical wall akinesis.   PLAN  -c/w Aspirin 81mg  -dc Plavix 75mg, plan for Brilinta 180mg tonight at 9pm and 90m g bid starting 2/22 AM  -c/w Atorvastatin 80mg  -c/w heparin gtt   - Plan for staged Impella assisted PCI of mid LAD/D2 bifurcation after viability study/inflammatory markers improve    #New onset CHF   Presenting with SOB. Pt found to be hypoxic and CXR showing fluid overload. BNP 9234. S/p Lasix 60mg in ED. Likely in setting of ACS. Pt not responding appropriately to 60 IV Lasix x 2 requiring 5mg metolazone, 2mg Bumex STAT then 1mg/hr Bumex infusion.   -bumex dc'd (2/22) pt w/ improved volume status. AM of 2/22 pt found to be very lethargic. ABG showed pH of 7.54 Bicarb of 35 from 26 on admission.  -s/p coronary angiogram w/ placement of swan todd cath (RIJ) to monitor CVP - (goal pressure bet 4-5)- 2/23  PLAN  -cont to hold further diuresis, IVF as needed  -s/p acetazolamide 250mg IVPB x2. ABG improved. mentating well today. d/c'd acetazolamide   -sating well on RM air  -trend CMP    Pulmonary   #Acute hypoxic respiratory failure - RESOLVED  On admission hypoxic to 87%. Improvement to 93% on 3L. Likely 2/2 PNA (COVID vs bacterial) and fluid overloaded in setting of ACS w/ c/f for new onset HF. S/p Solumedrol 125mg, Duoneb x 1.   -Supplemental oxygen prn. Pt w. inc O2 NC increased from 3 to 6L sating in low 90's, w/ increased WOB requiring Bumex drip.  -S/p Solumedrol 125mg   -bumex dc'd (2/22)  PLAN  -Duonebs q6 prn  -Management for PNA below  -trend CMP, replete lytes and monitor LFTs to assess for organ perfusion  -sating well on RM air    #PNA, CAP vs COVID  Management as below     ID  #Sepsis 2/2 CAP   Pt meeting 3/4 SIRS criteria (tachycardic, tachypneic, leukocytosis). Was started on CAP tx at urgent care. CXR showing pulmonary edema with suspected b/l opacities. Procalcitonin 3.36. s/p Azithromycin 500mg and Ceftriaxone 1g in ED. Pt initially dc'd abx due to low suspicion for infection w/ SIRS + leukocytosis presumed to be 2/2 to CHF exacerbation.  -Pt spiked new fever of 103.6 rectal, tachycardic to 131 and tachypneic to 26. Pt restarted on vanc + zosyn. w/ source still likely to be PNA  -pt w/ increasing white count   -UA negative, Urine strep and legi negative    -Patient also COVID PCR neg x3 but positive for COVID antibodies. Patient w/ high fevers, increased WBC however hemodynamically stable s/p bumex gtt which increases suspicion for COVID PNA vs less likely sepsis 2/2 bacterial infection given patient maintaining pressures i/s/o diuresis (would expect patient to go into septic shock if bacterial PNA)  PLAN  -UA (2/29) neg  -BCx (2/20 AM and PM) NGTD  -sputum cultures growing Melissa Dubliniensis (rare opportunistic yeast). Pt has never been HIV tested. states he has not been sexually active for 10+ years, denies hx of intercourse w/ men. denies hx of IV drug use. HIV screen nonreactive. HIV neg. Hep panel unremarkable  -pt no longer spiking fevers, down trending WBC on zosyn, no antifungals given at this time.   -MRSA swab neg -> vanc dc'd  -Lactate normalized  PLAN  -C/w zosyn 4.5g q6h x7 days (2/20-2/26)  -Will consider deescalating or discontinuing ABX if no culture data returns  -f/u repeat COVID swab, COVID IgA, and RVP  -f/u SONIA and ANCA (if negative will f/u w/ HLA B27, pt w/ hx of cervical arthritis and psoriasis)  -Per ID, pt w/ high suspicion for covid. will remain on COVID tele unit under CCU service     GI  #Mild transaminitis diff include COVID induced (so far covid neg) vs other hepatobiliary infection (NEG murphys sign, and not presenting w/ transaminitis in setting of fever) vs medication induced (acetazolamide and bumex) vs hypoperfusion injury (pt aggressively diuresed)  -improving s/p IVF  -elevated alk phos likely elevated inflammatory marker   -trend cmp, cont to monitor    Renal   #Metabolic alkalosis -RESOLVED  pt w/ increased lethargy noted on AM of 2/22. ABG at the time showed pH of 7.53 and Bicarb of 35 from 26 on admission, likely 2/2 contraction alkalosis from aggressive diuresis  PLAN  -holding diuresis   -s/p acetazolamide 250mg IVPB x2. ABG improved. mentating well today. d/c'd acetazolamide   -s/p IVF, ABG improved  -monitor mental status and I/O's    Endo  #Hyperglycemia   Glucose 124 on admission. No known h/o DM or Pre-DM   -HgbA1c wnl  PLAN  -c/w ISS     Heme   #Anemia   Found to have an Hgb of 11.6. Per [facility] labs, baseline Hgb is 15.   -iron studies c/w anemia of chronic disease  PLAN  -in setting of sepsis will hold iron tx for now   -Maintain active T&S    #Thrombocytosis  -Plts 593, likely reactive in setting of ACS and PNA although per [facility]IE labs Plts 412 in 1/2020.   PLAN  -Monitor CBC     Derm   #Psoriasis   Not on any medications     MSK   #Arthritis   -Tylenol 650mg prn     Allergy  #Seasonal allergies   -C/w home Claritin     F: none  E: replete K <4, Mg <2  N: DASH/TLC, fluid restriction    GI Ppx: None   DVT Ppx: Hep gtt  Code status: FULL   Dispo: CCU    Patient is a 59yo M PMH chronic low back pain with sciatica, arthritis, psoriasis presenting with 1.5 weeks of shortness of breath and chest pain. Found to be hypoxic and have significant pulmonary edema with new onset HF (TTE EF 20-25%), b/l lung opacities with concern for PNA (pt meeting 3/4 SIRS criteria) and late presenting STEMI (trops 0.67, EKG with septal q waves and ST elevations). In the ED, pt was Aspirin/Plavix loaded and started on a Heparin gtt. Patient will be admitted to the CCU for further management.     Neuro  AAOx3     Cardiovascular   #ACS   Presenting with diffuse chest pain. Trops 0.67 (repeat 0.52). EKG with septal q waves and ST elevation c/f late presenting STEMI. No known CAD. S/p Aspirin 325mg, Plavix 600mg, Heparin bolus. TTE  this admission w/ Severely reduced left ventricular systolic function and regional wall motion abnormalities consistent with ischemic heart disease. LV EF 24%.  -s/p hep bolus. ptt subtherapeutic hep increased from 18.5 to 20 (goal 53-73)  s/p coronary angiogram 2/23, [facility], C, [place] Todd insertion (RIJ). Cath revealing mid LAD 99% subtotal occlusion, LAD D2: ostial 90% stenosis. LV gram: EF 15%, anterior apical wall akinesis.   PLAN  -c/w Aspirin 81mg  -dc Plavix 75mg, plan for Brilinta 180mg tonight at 9pm and 90m g bid starting 2/22 AM  -c/w Atorvastatin 80mg  -c/w heparin gtt   - Plan for staged Impella assisted PCI of mid LAD/D2 bifurcation after viability study/inflammatory markers improve    #New onset CHF   Presenting with SOB. Pt found to be hypoxic and CXR showing fluid overload. BNP 9234. S/p Lasix 60mg in ED. Likely in setting of ACS. Pt not responding appropriately to 60 IV Lasix x 2 requiring 5mg metolazone, 2mg Bumex STAT then 1mg/hr Bumex infusion.   -bumex dc'd (2/22) pt w/ improved volume status. AM of 2/22 pt found to be very lethargic. ABG showed pH of 7.54 Bicarb of 35 from 26 on admission.  -s/p coronary angiogram w/ placement of swan todd cath (RIJ) to monitor CVP - (goal pressure bet 4-5)- 2/23  PLAN  -cont to hold further diuresis, IVF as needed  -s/p acetazolamide 250mg IVPB x2. ABG improved. mentating well today. d/c'd acetazolamide   -sating well on RM air  -trend CMP    Pulmonary   #Acute hypoxic respiratory failure - RESOLVED  On admission hypoxic to 87%. Improvement to 93% on 3L. Likely 2/2 PNA (COVID vs bacterial) and fluid overloaded in setting of ACS w/ c/f for new onset HF. S/p Solumedrol 125mg, Duoneb x 1.   -Supplemental oxygen prn. Pt w. inc O2 NC increased from 3 to 6L sating in low 90's, w/ increased WOB requiring Bumex drip.  -S/p Solumedrol 125mg   -bumex dc'd (2/22)  PLAN  -Duonebs q6 prn  -Management for PNA below  -trend CMP, replete lytes and monitor LFTs to assess for organ perfusion  -sating well on RM air    #PNA, CAP vs COVID  Management as below     ID  #Sepsis 2/2 CAP   Pt meeting 3/4 SIRS criteria (tachycardic, tachypneic, leukocytosis). Was started on CAP tx at urgent care. CXR showing pulmonary edema with suspected b/l opacities. Procalcitonin 3.36. s/p Azithromycin 500mg and Ceftriaxone 1g in ED. Pt initially dc'd abx due to low suspicion for infection w/ SIRS + leukocytosis presumed to be 2/2 to CHF exacerbation.  -Pt spiked new fever of 103.6 rectal, tachycardic to 131 and tachypneic to 26. Pt restarted on vanc + zosyn. w/ source still likely to be PNA  -pt w/ increasing white count   -UA negative, Urine strep and legi negative    -Patient also COVID PCR neg x3 but positive for COVID antibodies. Patient w/ high fevers, increased WBC however hemodynamically stable s/p bumex gtt which increases suspicion for COVID PNA vs less likely sepsis 2/2 bacterial infection given patient maintaining pressures i/s/o diuresis (would expect patient to go into septic shock if bacterial PNA)  PLAN  -UA (2/29) neg  -BCx (2/20 AM and PM) NGTD  -sputum cultures growing Melissa Dubliniensis (rare opportunistic yeast). Pt has never been HIV tested. states he has not been sexually active for 10+ years, denies hx of intercourse w/ men. denies hx of IV drug use. HIV screen nonreactive. HIV neg. Hep panel unremarkable  -pt no longer spiking fevers, down trending WBC on zosyn, no antifungals given at this time.   -MRSA swab neg -> vanc dc'd  -Lactate normalized  -rapid covid NEG x4 and repeat COVID swab and RVP from 2/24 NEG  PLAN  -C/w zosyn 4.5g q6h x7 days (2/20-2/26)  -Will consider deescalating or discontinuing ABX if no culture data returns  -f/u COVID IgA  -f/u SONIA and ANCA (if negative will f/u w/ HLA B27, pt w/ hx of cervical arthritis and psoriasis)  -Per ID, pt w/ high suspicion for covid. will remain on COVID tele unit under CCU service     GI  #Mild transaminitis diff include COVID induced (so far covid neg) vs other hepatobiliary infection (NEG murphys sign, and not presenting w/ transaminitis in setting of fever) vs medication induced (acetazolamide and bumex) vs hypoperfusion injury (pt aggressively diuresed)  -improving s/p IVF  -elevated alk phos likely elevated inflammatory marker   -trend cmp, cont to monitor    Renal   #Metabolic alkalosis -RESOLVED  pt w/ increased lethargy noted on AM of 2/22. ABG at the time showed pH of 7.53 and Bicarb of 35 from 26 on admission, likely 2/2 contraction alkalosis from aggressive diuresis  PLAN  -holding diuresis   -s/p acetazolamide 250mg IVPB x2. ABG improved. mentating well today. d/c'd acetazolamide   -s/p IVF, ABG improved  -monitor mental status and I/O's    Endo  #Hyperglycemia   Glucose 124 on admission. No known h/o DM or Pre-DM   -HgbA1c wnl  PLAN  -c/w ISS     Heme   #Anemia   Found to have an Hgb of 11.6. Per [facility]IE labs, baseline Hgb is 15.   -iron studies c/w anemia of chronic disease  PLAN  -in setting of sepsis will hold iron tx for now   -Maintain active T&S    #Thrombocytosis  -Plts 593, likely reactive in setting of ACS and PNA although per [facility] labs Plts 412 in 1/2020.   PLAN  -Monitor CBC     Derm   #Psoriasis   Not on any medications     MSK   #Arthritis   -Tylenol 650mg prn     Allergy  #Seasonal allergies   -C/w home Claritin     F: none  E: replete K <4, Mg <2  N: DASH/TLC, fluid restriction    GI Ppx: None   DVT Ppx: Hep gtt  Code status: FULL   Dispo: CCU

## 2021-02-24 NOTE — PROGRESS NOTE ADULT - SUBJECTIVE AND OBJECTIVE BOX
***Note in progress***    OVERNIGHT EVENTS: s/p coronary angiogram, LHC, RHC, [place] Todd insertion in RIJ.    -tenderness at site if swan cath. otherwise no complaints.    SUBJECTIVE / INTERVAL HPI: Patient seen and examined at bedside. Patient denying chest pain, SOB, palpitations, cough. Patient denies fever, chills, HA, Dizziness, change in vision/hearing, N/V, abdominal pain, diarrhea, constipation, hematochezia/melena, dysuria, hematuria, new onset weakness/numbness, LE pain and/or swelling.    Remaining ROS negative       PHYSICAL EXAM:    General: WDWN  HEENT: NC/AT; PERRL, anicteric sclera; MMM  Neck: supple  Cardiovascular: +S1/S2, RRR  Respiratory: CTA B/L; no W/R/R  Gastrointestinal: soft, NT/ND; +BSx4  Extremities: WWP; no edema, clubbing or cyanosis  Vascular: 2+ radial, DP/PT pulses B/L  Neurological: AAOx3; no focal deficits  Psychiatric: pleasant mood and affect  Dermatologic: no appreciable wounds or damage to the skin    VITAL SIGNS:  Vital Signs Last 24 Hrs  T(C): 36.6 (24 Feb 2021 02:17), Max: 37.3 (23 Feb 2021 22:00)  T(F): 97.8 (24 Feb 2021 02:17), Max: 99.2 (23 Feb 2021 22:00)  HR: 78 (24 Feb 2021 07:00) (78 - 108)  BP: 111/64 (24 Feb 2021 07:00) (86/62 - 113/65)  BP(mean): 82 (24 Feb 2021 07:00) (70 - 83)  RR: 19 (24 Feb 2021 07:00) (17 - 43)  SpO2: 99% (24 Feb 2021 07:00) (89% - 100%)      MEDICATIONS:  MEDICATIONS  (STANDING):  aspirin enteric coated 81 milliGRAM(s) Oral every 24 hours  atorvastatin 80 milliGRAM(s) Oral at bedtime  chlorhexidine 2% Cloths 1 Application(s) Topical <User Schedule>  heparin  Infusion 1900 Unit(s)/Hr (19 mL/Hr) IV Continuous <Continuous>  melatonin 5 milliGRAM(s) Oral at bedtime  piperacillin/tazobactam IVPB.. 4.5 Gram(s) IV Intermittent every 6 hours  ticagrelor 90 milliGRAM(s) Oral every 12 hours    MEDICATIONS  (PRN):  acetaminophen   Tablet .. 650 milliGRAM(s) Oral every 6 hours PRN Temp greater or equal to 38C (100.4F), Mild Pain (1 - 3), Moderate Pain (4 - 6)  albuterol/ipratropium for Nebulization 3 milliLiter(s) Nebulizer every 6 hours PRN Shortness of Breath and/or Wheezing  guaiFENesin   Syrup  (Sugar-Free) 200 milliGRAM(s) Oral every 6 hours PRN Cough      ALLERGIES:  Allergies    No Known Allergies    Intolerances        LABS:                        11.0   13.89 )-----------( 906      ( 24 Feb 2021 05:56 )             33.7     02-24    133<L>  |  100  |  28<H>  ----------------------------<  115<H>  3.9   |  21<L>  |  0.84    Ca    9.3      24 Feb 2021 05:56  Phos  3.0     02-24  Mg     1.8     02-24    TPro  6.8  /  Alb  2.8<L>  /  TBili  0.5  /  DBili  x   /  AST  32  /  ALT  59<H>  /  AlkPhos  138<H>  02-24    PT/INR - ( 23 Feb 2021 04:55 )   PT: 14.2 sec;   INR: 1.19          PTT - ( 24 Feb 2021 05:56 )  PTT:76.8 sec    CAPILLARY BLOOD GLUCOSE      POCT Blood Glucose.: 143 mg/dL (23 Feb 2021 05:28)      RADIOLOGY & ADDITIONAL TESTS: Reviewed. ***Note in progress***    OVERNIGHT EVENTS: s/p coronary angiogram, LHC, RHC, [place] Todd insertion in RIJ.    -tenderness at site if swan cath. otherwise no complaints.    SUBJECTIVE / INTERVAL HPI: Patient seen and examined at bedside. Patient complaining of tenderness at site of swan cath and non productive cough which has improved from day prior, otherwise no complaints. Pt denying chest pain, SOB, palpitations. Patient denies fever, chills, HA, Dizziness, change in vision/hearing, N/V, abdominal pain, diarrhea, constipation, hematochezia/melena, dysuria, hematuria, new onset weakness/numbness, LE pain and/or swelling.    Remaining ROS negative         PHYSICAL EXAM:  General: NAD, resting comfortably in bed, speaking in full sentences on rm air  HEENT: NC/AT, RIJ in place, mild bruising no hematoma  Cardiovascular: RRR; +S1/S2, no r/m/g  Respiratory: CTA b/l; no W/R/R; no retractions or accessory muscle use  Gastrointestinal: soft, NT/ND; +BSx4, (-) Durant's sign  Extremities: WWP; no edema, clubbing or cyanosis. L A-line in place  Vascular: 2+ radial, DP/PT pulses B/L  Neurological: AAOx3; no focal deficits    VITAL SIGNS:  Vital Signs Last 24 Hrs  T(C): 36.6 (24 Feb 2021 02:17), Max: 37.3 (23 Feb 2021 22:00)  T(F): 97.8 (24 Feb 2021 02:17), Max: 99.2 (23 Feb 2021 22:00)  HR: 78 (24 Feb 2021 07:00) (78 - 108)  BP: 111/64 (24 Feb 2021 07:00) (86/62 - 113/65)  BP(mean): 82 (24 Feb 2021 07:00) (70 - 83)  RR: 19 (24 Feb 2021 07:00) (17 - 43)  SpO2: 99% (24 Feb 2021 07:00) (89% - 100%)      MEDICATIONS:  MEDICATIONS  (STANDING):  aspirin enteric coated 81 milliGRAM(s) Oral every 24 hours  atorvastatin 80 milliGRAM(s) Oral at bedtime  chlorhexidine 2% Cloths 1 Application(s) Topical <User Schedule>  heparin  Infusion 1900 Unit(s)/Hr (19 mL/Hr) IV Continuous <Continuous>  melatonin 5 milliGRAM(s) Oral at bedtime  piperacillin/tazobactam IVPB.. 4.5 Gram(s) IV Intermittent every 6 hours  ticagrelor 90 milliGRAM(s) Oral every 12 hours    MEDICATIONS  (PRN):  acetaminophen   Tablet .. 650 milliGRAM(s) Oral every 6 hours PRN Temp greater or equal to 38C (100.4F), Mild Pain (1 - 3), Moderate Pain (4 - 6)  albuterol/ipratropium for Nebulization 3 milliLiter(s) Nebulizer every 6 hours PRN Shortness of Breath and/or Wheezing  guaiFENesin   Syrup  (Sugar-Free) 200 milliGRAM(s) Oral every 6 hours PRN Cough      ALLERGIES:  Allergies    No Known Allergies    Intolerances        LABS:                        11.0   13.89 )-----------( 906      ( 24 Feb 2021 05:56 )             33.7     02-24    133<L>  |  100  |  28<H>  ----------------------------<  115<H>  3.9   |  21<L>  |  0.84    Ca    9.3      24 Feb 2021 05:56  Phos  3.0     02-24  Mg     1.8     02-24    TPro  6.8  /  Alb  2.8<L>  /  TBili  0.5  /  DBili  x   /  AST  32  /  ALT  59<H>  /  AlkPhos  138<H>  02-24    PT/INR - ( 23 Feb 2021 04:55 )   PT: 14.2 sec;   INR: 1.19          PTT - ( 24 Feb 2021 05:56 )  PTT:76.8 sec    CAPILLARY BLOOD GLUCOSE      POCT Blood Glucose.: 143 mg/dL (23 Feb 2021 05:28)      RADIOLOGY & ADDITIONAL TESTS: Reviewed. OVERNIGHT EVENTS: s/p coronary angiogram, LHC, RHC, [place] Todd insertion in RIJ. PTT supratherputic decreased from 20 to 19      SUBJECTIVE / INTERVAL HPI: Patient seen and examined at bedside. Patient complaining of tenderness at site of swan cath and non productive cough which has improved from day prior, otherwise no complaints. Pt denying chest pain, SOB, palpitations. Patient denies fever, chills, HA, Dizziness, change in vision/hearing, N/V, abdominal pain, diarrhea, constipation, hematochezia/melena, dysuria, hematuria, new onset weakness/numbness, LE pain and/or swelling.    Remaining ROS negative         PHYSICAL EXAM:  General: NAD, resting comfortably in bed, speaking in full sentences on rm air  HEENT: NC/AT, (RIJ) swan cath in place, mild bruising no hematoma  Cardiovascular: RRR; +S1/S2, no r/m/g  Respiratory: CTA b/l; no W/R/R; no retractions or accessory muscle use  Gastrointestinal: soft, NT/ND; +BSx4, (-) Durant's sign  Extremities: WWP; no edema, clubbing or cyanosis. L A-line in place  Vascular: 2+ radial, DP/PT pulses B/L  Neurological: AAOx3; no focal deficits    VITAL SIGNS:  Vital Signs Last 24 Hrs  T(C): 36.6 (24 Feb 2021 02:17), Max: 37.3 (23 Feb 2021 22:00)  T(F): 97.8 (24 Feb 2021 02:17), Max: 99.2 (23 Feb 2021 22:00)  HR: 78 (24 Feb 2021 07:00) (78 - 108)  BP: 111/64 (24 Feb 2021 07:00) (86/62 - 113/65)  BP(mean): 82 (24 Feb 2021 07:00) (70 - 83)  RR: 19 (24 Feb 2021 07:00) (17 - 43)  SpO2: 99% (24 Feb 2021 07:00) (89% - 100%)      MEDICATIONS:  MEDICATIONS  (STANDING):  aspirin enteric coated 81 milliGRAM(s) Oral every 24 hours  atorvastatin 80 milliGRAM(s) Oral at bedtime  chlorhexidine 2% Cloths 1 Application(s) Topical <User Schedule>  heparin  Infusion 1900 Unit(s)/Hr (19 mL/Hr) IV Continuous <Continuous>  melatonin 5 milliGRAM(s) Oral at bedtime  piperacillin/tazobactam IVPB.. 4.5 Gram(s) IV Intermittent every 6 hours  ticagrelor 90 milliGRAM(s) Oral every 12 hours    MEDICATIONS  (PRN):  acetaminophen   Tablet .. 650 milliGRAM(s) Oral every 6 hours PRN Temp greater or equal to 38C (100.4F), Mild Pain (1 - 3), Moderate Pain (4 - 6)  albuterol/ipratropium for Nebulization 3 milliLiter(s) Nebulizer every 6 hours PRN Shortness of Breath and/or Wheezing  guaiFENesin   Syrup  (Sugar-Free) 200 milliGRAM(s) Oral every 6 hours PRN Cough      ALLERGIES:  Allergies    No Known Allergies    Intolerances        LABS:                        11.0   13.89 )-----------( 906      ( 24 Feb 2021 05:56 )             33.7     02-24    133<L>  |  100  |  28<H>  ----------------------------<  115<H>  3.9   |  21<L>  |  0.84    Ca    9.3      24 Feb 2021 05:56  Phos  3.0     02-24  Mg     1.8     02-24    TPro  6.8  /  Alb  2.8<L>  /  TBili  0.5  /  DBili  x   /  AST  32  /  ALT  59<H>  /  AlkPhos  138<H>  02-24    PT/INR - ( 23 Feb 2021 04:55 )   PT: 14.2 sec;   INR: 1.19          PTT - ( 24 Feb 2021 05:56 )  PTT:76.8 sec    CAPILLARY BLOOD GLUCOSE      POCT Blood Glucose.: 143 mg/dL (23 Feb 2021 05:28)      RADIOLOGY & ADDITIONAL TESTS: Reviewed.

## 2021-02-24 NOTE — DIETITIAN INITIAL EVALUATION ADULT. - OTHER INFO
61yo M PMH chronic low back pain with sciatica, arthritis, psoriasis presenting with 1.5 weeks of shortness of breath and chest pain. Found to be hypoxic and have significant pulmonary edema with new onset HF (bedside echo EF40%), b/l lung opacities with concern for PNA (pt meeting 3/4 SIRS criteria) and late presenting STEMI (trops 0.67, EKG with septal q waves and ST elevations). In the ED, pt was Aspirin/Plavix loaded and started on a Heparin gtt. Pt admitted to the CCU for further management. Pt s/p coronary angiogram 2/23, LHC, RHC, [place] Todd insertion (RIJ). Cath revealing mid LAD 99% subtotal occlusion, LAD D2 ostial 90% stenosis. LV gram EF 15%, anterior apical wall akinesis. Now with Plan for staged Impella assisted PCI of mid LAD/D2 bifurcation after viability study/inflammatory markers improve.   Pt seen outside room on 3WO. Pt sleeping, on room air, pt left to rest. No prior RD notes to pull weights or Hx from. Admission wt of 165 pounds and ht 5'11'' seem accurate, per visual pt appears to be with moderate wasting to clavicle region. Pt with edema at this time which is likely masking true wt, 1+BL leg edema. Pt currently ordered for DASH TLCdiet/NPO for test procedure - unsure of %PO intake prior to NPO order. Various NPO@12 orders noted for w/u during admission. No pressure ulcers. BM 2/23, soft NT. No pain at this time.   belwo

## 2021-02-24 NOTE — PROGRESS NOTE ADULT - ATTENDING COMMENTS
Pt is a 61 y/o man c sciatica, arthritis, psoriasis who p/w chest pain and dyspnea found to be hypoxic with severe pulmonary edema.  Presentation c/w ACS (late presenting STEMI?) with also SIRS.    TTE: EF 25%, apical severe hypokinesis  Cath: LVEDP 2  CI 2.5  PCWP 2  99% mid LAD at D2 bifurcation    afeb, 94/64, HR 71, 17, 98% NC    I/O -770, + 1440    Mathieu 0.67  BNP 9232  CRP 31--> 15    D-dimer 464 --> 873    WBC 22 --> 13  Hgb 12.1 --> 11  Plt 727 --> 906    Na 133  Cl 89 --> 100  Cr 0.92 --> 1.01 -->  0.84    Cx: ANI lopez  CXR: much improved, less pulm edema    - pt with ACS and acute systolic CHF, ant MI  - agree to cont asa, brillanta & heparin in pt  - cath showed severe LAD dx with very low EF  - since echo and cath both show akinesis of walls, can go for viability for ant for assess risk of improvement with PCI  - cont lipitor  - hold ace/b-blocker for now  - cont diamox for alkalemia  - pt with lekocytosis, thrombocytosis with increased inflammatory markers, and markers have decreased  - pt COVID -ve * >5  - cont abx for CAP * 7 days  - pt w/ acute hypoxemic resp failure, cont wean O2.   - will do w/u  for autoimmune disease, HIV and COVID -ve, test for RVP and SONIA

## 2021-02-24 NOTE — DIETITIAN INITIAL EVALUATION ADULT. - ORAL NUTRITION SUPPLEMENTS
Consider ONS if PO intake falls below 50% of meals, suggest ensure enlive x1/day (350kcal/20gm prot) - increase use PRN Pending Exact %PO.

## 2021-02-24 NOTE — DIETITIAN INITIAL EVALUATION ADULT. - OTHER CALCULATIONS
%IBW=95.9; current body wt used for energy calculations as pt falls within % IBW; adjusted for age, possible COVID19, CHF; fluids per team

## 2021-02-25 LAB
ALBUMIN SERPL ELPH-MCNC: 3.1 G/DL — LOW (ref 3.3–5)
ALP SERPL-CCNC: 131 U/L — HIGH (ref 40–120)
ALT FLD-CCNC: 44 U/L — SIGNIFICANT CHANGE UP (ref 10–45)
ANA TITR SER: NEGATIVE — SIGNIFICANT CHANGE UP
ANION GAP SERPL CALC-SCNC: 11 MMOL/L — SIGNIFICANT CHANGE UP (ref 5–17)
ANISOCYTOSIS BLD QL: SLIGHT — SIGNIFICANT CHANGE UP
APTT BLD: 57.9 SEC — HIGH (ref 27.5–35.5)
AST SERPL-CCNC: 20 U/L — SIGNIFICANT CHANGE UP (ref 10–40)
BASOPHILS # BLD AUTO: 0.15 K/UL — SIGNIFICANT CHANGE UP (ref 0–0.2)
BASOPHILS NFR BLD AUTO: 0.9 % — SIGNIFICANT CHANGE UP (ref 0–2)
BILIRUB SERPL-MCNC: 0.4 MG/DL — SIGNIFICANT CHANGE UP (ref 0.2–1.2)
BUN SERPL-MCNC: 21 MG/DL — SIGNIFICANT CHANGE UP (ref 7–23)
CALCIUM SERPL-MCNC: 9.4 MG/DL — SIGNIFICANT CHANGE UP (ref 8.4–10.5)
CHLORIDE SERPL-SCNC: 100 MMOL/L — SIGNIFICANT CHANGE UP (ref 96–108)
CO2 SERPL-SCNC: 22 MMOL/L — SIGNIFICANT CHANGE UP (ref 22–31)
CREAT SERPL-MCNC: 0.73 MG/DL — SIGNIFICANT CHANGE UP (ref 0.5–1.3)
CRP SERPL-MCNC: 2.8 MG/DL — HIGH (ref 0–0.4)
CULTURE RESULTS: SIGNIFICANT CHANGE UP
D DIMER BLD IA.RAPID-MCNC: 1873 NG/ML DDU — HIGH
EOSINOPHIL # BLD AUTO: 0.71 K/UL — HIGH (ref 0–0.5)
EOSINOPHIL NFR BLD AUTO: 4.4 % — SIGNIFICANT CHANGE UP (ref 0–6)
ERYTHROCYTE [SEDIMENTATION RATE] IN BLOOD: 106 MM/HR — HIGH
ERYTHROCYTE [SEDIMENTATION RATE] IN BLOOD: 130 MM/HR — HIGH
FERRITIN SERPL-MCNC: 630 NG/ML — HIGH (ref 30–400)
GLUCOSE BLDC GLUCOMTR-MCNC: 100 MG/DL — HIGH (ref 70–99)
GLUCOSE SERPL-MCNC: 118 MG/DL — HIGH (ref 70–99)
HCT VFR BLD CALC: 33.3 % — LOW (ref 39–50)
HGB BLD-MCNC: 10.7 G/DL — LOW (ref 13–17)
LYMPHOCYTES # BLD AUTO: 1.97 K/UL — SIGNIFICANT CHANGE UP (ref 1–3.3)
LYMPHOCYTES # BLD AUTO: 12.2 % — LOW (ref 13–44)
MAGNESIUM SERPL-MCNC: 1.9 MG/DL — SIGNIFICANT CHANGE UP (ref 1.6–2.6)
MANUAL SMEAR VERIFICATION: SIGNIFICANT CHANGE UP
MCHC RBC-ENTMCNC: 29.2 PG — SIGNIFICANT CHANGE UP (ref 27–34)
MCHC RBC-ENTMCNC: 32.1 GM/DL — SIGNIFICANT CHANGE UP (ref 32–36)
MCV RBC AUTO: 90.7 FL — SIGNIFICANT CHANGE UP (ref 80–100)
MICROCYTES BLD QL: SLIGHT — SIGNIFICANT CHANGE UP
MONOCYTES # BLD AUTO: 0.69 K/UL — SIGNIFICANT CHANGE UP (ref 0–0.9)
MONOCYTES NFR BLD AUTO: 4.3 % — SIGNIFICANT CHANGE UP (ref 2–14)
MYELOCYTES NFR BLD: 1.7 % — HIGH (ref 0–0)
NEUTROPHILS # BLD AUTO: 12.34 K/UL — HIGH (ref 1.8–7.4)
NEUTROPHILS NFR BLD AUTO: 76.5 % — SIGNIFICANT CHANGE UP (ref 43–77)
OVALOCYTES BLD QL SMEAR: SLIGHT — SIGNIFICANT CHANGE UP
PHOSPHATE SERPL-MCNC: 3 MG/DL — SIGNIFICANT CHANGE UP (ref 2.5–4.5)
PLAT MORPH BLD: ABNORMAL
PLATELET # BLD AUTO: 896 K/UL — HIGH (ref 150–400)
POLYCHROMASIA BLD QL SMEAR: SLIGHT — SIGNIFICANT CHANGE UP
POTASSIUM SERPL-MCNC: 3.8 MMOL/L — SIGNIFICANT CHANGE UP (ref 3.5–5.3)
POTASSIUM SERPL-SCNC: 3.8 MMOL/L — SIGNIFICANT CHANGE UP (ref 3.5–5.3)
PROT SERPL-MCNC: 6.7 G/DL — SIGNIFICANT CHANGE UP (ref 6–8.3)
RBC # BLD: 3.67 M/UL — LOW (ref 4.2–5.8)
RBC # FLD: 13.3 % — SIGNIFICANT CHANGE UP (ref 10.3–14.5)
RBC BLD AUTO: ABNORMAL
SODIUM SERPL-SCNC: 133 MMOL/L — LOW (ref 135–145)
SPECIMEN SOURCE: SIGNIFICANT CHANGE UP
WBC # BLD: 16.13 K/UL — HIGH (ref 3.8–10.5)
WBC # FLD AUTO: 16.13 K/UL — HIGH (ref 3.8–10.5)

## 2021-02-25 PROCEDURE — 71045 X-RAY EXAM CHEST 1 VIEW: CPT | Mod: 26

## 2021-02-25 PROCEDURE — 99291 CRITICAL CARE FIRST HOUR: CPT

## 2021-02-25 RX ORDER — POTASSIUM CHLORIDE 20 MEQ
20 PACKET (EA) ORAL ONCE
Refills: 0 | Status: DISCONTINUED | OUTPATIENT
Start: 2021-02-25 | End: 2021-02-25

## 2021-02-25 RX ORDER — VALSARTAN 80 MG/1
40 TABLET ORAL EVERY 24 HOURS
Refills: 0 | Status: DISCONTINUED | OUTPATIENT
Start: 2021-02-25 | End: 2021-02-27

## 2021-02-25 RX ORDER — POTASSIUM CHLORIDE 20 MEQ
20 PACKET (EA) ORAL ONCE
Refills: 0 | Status: COMPLETED | OUTPATIENT
Start: 2021-02-25 | End: 2021-02-25

## 2021-02-25 RX ORDER — ENOXAPARIN SODIUM 100 MG/ML
40 INJECTION SUBCUTANEOUS EVERY 24 HOURS
Refills: 0 | Status: DISCONTINUED | OUTPATIENT
Start: 2021-02-25 | End: 2021-02-27

## 2021-02-25 RX ADMIN — ATORVASTATIN CALCIUM 80 MILLIGRAM(S): 80 TABLET, FILM COATED ORAL at 01:04

## 2021-02-25 RX ADMIN — TICAGRELOR 90 MILLIGRAM(S): 90 TABLET ORAL at 01:04

## 2021-02-25 RX ADMIN — TICAGRELOR 90 MILLIGRAM(S): 90 TABLET ORAL at 10:14

## 2021-02-25 RX ADMIN — Medication 200 MILLIGRAM(S): at 01:03

## 2021-02-25 RX ADMIN — Medication 200 MILLIGRAM(S): at 21:37

## 2021-02-25 RX ADMIN — Medication 5 MILLIGRAM(S): at 01:04

## 2021-02-25 RX ADMIN — CHLORHEXIDINE GLUCONATE 1 APPLICATION(S): 213 SOLUTION TOPICAL at 08:07

## 2021-02-25 RX ADMIN — PIPERACILLIN AND TAZOBACTAM 200 GRAM(S): 4; .5 INJECTION, POWDER, LYOPHILIZED, FOR SOLUTION INTRAVENOUS at 08:07

## 2021-02-25 RX ADMIN — Medication 5 MILLIGRAM(S): at 21:37

## 2021-02-25 RX ADMIN — PIPERACILLIN AND TAZOBACTAM 200 GRAM(S): 4; .5 INJECTION, POWDER, LYOPHILIZED, FOR SOLUTION INTRAVENOUS at 13:00

## 2021-02-25 RX ADMIN — PIPERACILLIN AND TAZOBACTAM 200 GRAM(S): 4; .5 INJECTION, POWDER, LYOPHILIZED, FOR SOLUTION INTRAVENOUS at 01:03

## 2021-02-25 RX ADMIN — PIPERACILLIN AND TAZOBACTAM 200 GRAM(S): 4; .5 INJECTION, POWDER, LYOPHILIZED, FOR SOLUTION INTRAVENOUS at 17:19

## 2021-02-25 RX ADMIN — Medication 200 MILLIGRAM(S): at 10:14

## 2021-02-25 RX ADMIN — Medication 20 MILLIEQUIVALENT(S): at 19:14

## 2021-02-25 RX ADMIN — ATORVASTATIN CALCIUM 80 MILLIGRAM(S): 80 TABLET, FILM COATED ORAL at 21:37

## 2021-02-25 RX ADMIN — Medication 81 MILLIGRAM(S): at 08:07

## 2021-02-25 RX ADMIN — TICAGRELOR 90 MILLIGRAM(S): 90 TABLET ORAL at 21:37

## 2021-02-25 RX ADMIN — ENOXAPARIN SODIUM 40 MILLIGRAM(S): 100 INJECTION SUBCUTANEOUS at 10:47

## 2021-02-25 NOTE — CHART NOTE - NSCHARTNOTEFT_GEN_A_CORE
INFECTIOUS DISEASES BRIEF NOTE    Called to comment on COVID-19 isolation clearance.    Briefly, 60M h/o psoriasis, arthritis, sciatica p/w 1.5 weeks of SOB and CP, found to have high fever of 103, pulmonary edema from new onset CHF exacerbation 2/2 STEMI, and possible CAP/COVID19 PNA.  Patient was placed on COVID19 isolation upon admission given persistent high fever of 103.  During the course, he was followed by CCU team for late presentation of STEMI and CHF exacerbation.  He had acute hypoxic respiratory failure requiring HFNC, thought to be due to fluid overload from new CHF and MI as well as PNA (COVID vs bacterial).  He was tested negative of COVID 19 for 6 times between 2/20 - 2/24; however, COVID19 antibody was mildly positive on 2/20.  He also had elevated inflammatory markers, c/f COVID19 pneumonia despite multiple negative test and he remains on isolation per ID recommendation (2/22 note reviewed). Patient overall clinically improved and finishing zosyn 7 day course.  He is afebrile since 2/23, and he is now breathing on RA.  I was asked if he can come off isolation.      I suspect that patient does have COVID 19 pneumonia given his high fever, mildly elevated COVID19 serology, high inflammatory markers and high oxygen requirement.  He doesn't have other clear alternative diagnosis for high fever, and CXR more suggestive of pulmonary edema rather than bacterial pneumonia (no consolidation).   False negative tests on highly suspected cases usually come from low viral concentration in nasopharyngeal area (while high viral concentration found in the lung).  In such case, his discontinuation timing should be determined the same way as other COVID 19 patients.  He is now afebrile for >24h and respiratory status improved significantly.  I would consider the day of presentation as the "initial positive test" and recommend waiting for 10 days to discontinue isolation.      - remain COVID19 isolation until 2/30/21  - patient may walk around the 3WO hallway for PT and cardiac med optimization since 3WO hallway is under negative pressure.  Please notify 3WO staff members before patient comes out of the room so that everyone on the hallway wears N95 masks      If you have any questions, please feel free to contact me.    Keturah Reyes MD, MS  Infectious Disease attending  work cell [phone]

## 2021-02-25 NOTE — PROGRESS NOTE ADULT - ASSESSMENT
Patient is a 59yo M PMH chronic low back pain with sciatica, arthritis, psoriasis presenting with 1.5 weeks of shortness of breath and chest pain. Found to be hypoxic and have significant pulmonary edema with new onset HF (TTE EF 20-25%), b/l lung opacities with concern for PNA (pt meeting 3/4 SIRS criteria) and late presenting STEMI (trops 0.67, EKG with septal q waves and ST elevations). In the ED, pt was Aspirin/Plavix loaded and started on a Heparin gtt. Patient will be admitted to the CCU for further management.     Neuro  AAOx3     Cardiovascular   #ACS   Presenting with diffuse chest pain. Trops 0.67 (repeat 0.52). EKG with septal q waves and ST elevation c/f late presenting STEMI. No known CAD. S/p Aspirin 325mg, Plavix 600mg, Heparin bolus. TTE  this admission w/ Severely reduced left ventricular systolic function and regional wall motion abnormalities consistent with ischemic heart disease. LV EF 24%.  -s/p hep bolus. ptt subtherapeutic hep increased from 18.5 to 20 (goal 53-73)  s/p coronary angiogram 2/23, C, RHC, [place] Todd insertion (RIJ). Cath revealing mid LAD 99% subtotal occlusion, LAD D2: ostial 90% stenosis. LV gram: EF 15%, anterior apical wall akinesis.   -initally planned for staged Impella assisted PCI of mid LAD/D2 bifurcation after viability study/inflammatory markers improve.   -Cardiac MRI revealing nonviable myocardium in the LAD territory.  -dc'ed Heparin gtt 2/25  PLAN  -c/w Aspirin 81mg  -s/p Plavix 75mg and Brilinta 180mg, c/w Brilinta 90mg bid  (started 2/22)  -c/w Atorvastatin 80mg        #New onset CHF   Presenting with SOB. Pt found to be hypoxic and CXR showing fluid overload. BNP 9234. S/p Lasix 60mg in ED. Likely in setting of ACS. Pt not responding appropriately to 60 IV Lasix x 2 requiring 5mg metolazone, 2mg Bumex STAT then 1mg/hr Bumex infusion.   -bumex dc'd (2/22) pt w/ improved volume status. AM of 2/22 pt found to be very lethargic. ABG showed pH of 7.54 Bicarb of 35 from 26 on admission.  -s/p coronary angiogram w/ placement of swan todd cath (RIJ) to monitor CVP - (goal pressure bet 4-5)- 2/23  PLAN  -cont to hold further diuresis, IVF as needed  -s/p acetazolamide 250mg IVPB x2. ABG improved. mentating well today. d/c'd acetazolamide   -sating well on RM air  -will consider adding ACE/ARB w/ hold parameters       Pulmonary   #Acute hypoxic respiratory failure - RESOLVED  On admission hypoxic to 87%. Improvement to 93% on 3L. Likely 2/2 PNA (COVID vs bacterial) and fluid overloaded in setting of ACS w/ c/f for new onset HF. S/p Solumedrol 125mg, Duoneb x 1.   -Supplemental oxygen prn. Pt w. inc O2 NC increased from 3 to 6L sating in low 90's, w/ increased WOB requiring Bumex drip.  -S/p Solumedrol 125mg   -bumex dc'd (2/22)  PLAN  -Duonebs q6 prn  -Management for PNA below  -trend CMP, replete lytes and monitor LFTs to assess for organ perfusion  -sating well on RM air    #PNA, CAP vs COVID  Management as below     ID  #Sepsis 2/2 CAP   Pt meeting 3/4 SIRS criteria (tachycardic, tachypneic, leukocytosis). Was started on CAP tx at urgent care. CXR showing pulmonary edema with suspected b/l opacities. Procalcitonin 3.36. s/p Azithromycin 500mg and Ceftriaxone 1g in ED. Pt initially dc'd abx due to low suspicion for infection w/ SIRS + leukocytosis presumed to be 2/2 to CHF exacerbation.  -Pt spiked new fever of 103.6 rectal, tachycardic to 131 and tachypneic to 26. Pt restarted on vanc + zosyn. w/ source still likely to be PNA  -pt w/ increasing white count   -UA negative, Urine strep and legi negative    -Patient also COVID PCR neg x3 but positive for COVID antibodies. Patient w/ high fevers, increased WBC however hemodynamically stable s/p bumex gtt which increases suspicion for COVID PNA vs less likely sepsis 2/2 bacterial infection given patient maintaining pressures i/s/o diuresis (would expect patient to go into septic shock if bacterial PNA)  PLAN  -UA (2/29) neg  -BCx (2/20 AM and PM) NGTD  -sputum cultures growing Melissa Dubliniensis (rare opportunistic yeast). Pt has never been HIV tested. states he has not been sexually active for 10+ years, denies hx of intercourse w/ men. denies hx of IV drug use. HIV screen nonreactive. HIV neg. Hep panel unremarkable  -pt no longer spiking fevers, down trending WBC on zosyn, no antifungals given at this time.   -MRSA swab neg -> vanc dc'd  -Lactate normalized  -rapid covid NEG x4 and repeat COVID swab and RVP from 2/24 NEG  PLAN  -C/w zosyn 4.5g q6h x7 days (2/20-2/26)  -Will consider deescalating or discontinuing ABX if no culture data returns  -f/u COVID IgA  -f/u SONIA and ANCA (if negative will f/u w/ HLA B27, pt w/ hx of cervical arthritis and psoriasis)  -Per ID, pt w/ high suspicion for covid. will remain on COVID tele unit under CCU service     GI  #Mild transaminitis diff include COVID induced (so far covid neg) vs other hepatobiliary infection (NEG murphys sign, and not presenting w/ transaminitis in setting of fever) vs medication induced (acetazolamide and bumex) vs hypoperfusion injury (pt aggressively diuresed)  -improving s/p IVF  -elevated alk phos likely elevated inflammatory marker   -trend cmp, cont to monitor    Renal   #Metabolic alkalosis -RESOLVED  pt w/ increased lethargy noted on AM of 2/22. ABG at the time showed pH of 7.53 and Bicarb of 35 from 26 on admission, likely 2/2 contraction alkalosis from aggressive diuresis  PLAN  -holding diuresis   -s/p acetazolamide 250mg IVPB x2. ABG improved. mentating well today. d/c'd acetazolamide   -s/p IVF, ABG improved  -monitor mental status and I/O's    Endo  #Hyperglycemia   Glucose 124 on admission. No known h/o DM or Pre-DM   -HgbA1c wnl  PLAN  -c/w ISS     Heme   #Anemia   Found to have an Hgb of 11.6. Per HIE labs, baseline Hgb is 15.   -iron studies c/w anemia of chronic disease  PLAN  -in setting of sepsis will hold iron tx for now   -Maintain active T&S    #Thrombocytosis  -Plts 593 on admission, likely reactive in setting of ACS and PNA although per HIE labs Plts 412 in 1/2020.   PLAN  -Monitor CBC     Derm   #Psoriasis   Not on any medications     MSK   #Arthritis   -Tylenol 650mg prn     Allergy  #Seasonal allergies   -C/w home Claritin     F: none  E: replete K <4, Mg <2  N: DASH/TLC, fluid restriction    GI Ppx: None   DVT Ppx: Hep gtt  Code status: FULL   Dispo: CCU

## 2021-02-25 NOTE — PROGRESS NOTE ADULT - ATTENDING COMMENTS
Pt is a 59 y/o man c sciatica, arthritis, psoriasis who p/w chest pain and dyspnea found to be hypoxic with severe pulmonary edema.  Presentation c/w ACS (late presenting STEMI?) with also SIRS.    TTE: EF 25%, apical severe hypokinesis  Cath: LVEDP 2  CI 2.5  PCWP 2  99% mid LAD at D2 bifurcation    Bout of hypotension 73/54 this am    cMRI: no viability in ant /apical region    afeb, 112/47, HR 71, 17, 95% NC    I/O -770, + 1440, -374    Mathieu 0.67  BNP 9232  CRP 31--> 15 --> 2.8    D-dimer 464 --> 873 --> 1800  Ferritin 1200 --> 600    WBC 22 --> 16  Hgb 12.1 --> 10  Plt 727 --> 906 --> 896    Na 133  Cl 89 --> 100  Cr 0.92 --> 1.01 -->  0.84    Cx: ANI lopez  CXR: much improved, less pulm edema    - pt with ACS and acute systolic CHF, ant MI  - agree to cont asa, brillanta & heparin in pt  - cath showed severe LAD dx with very low EF  - since echo and cath both show akinesis of walls, can go for viability for ant for assess risk of improvement with PCI  - pt s viability, cont medical mgmt s PCI  - cont lipitor  - hold ace/b-blocker for now  - cont diamox for alkalemia  - pt with lekocytosis, thrombocytosis with increased inflammatory markers, and markers have decreased  - pt COVID -ve * >5  - cont abx for CAP * 7 days  - pt w/ acute hypoxemic resp failure, cont wean O2.   - will do w/u  for autoimmune disease, HIV and COVID -ve, test for RVP and SONIA .  - pt may need rehab eval for acute sytolic CHF, discuss with infection control for d/c from COVID unit

## 2021-02-25 NOTE — PROGRESS NOTE ADULT - SUBJECTIVE AND OBJECTIVE BOX
OVERNIGHT EVENTS: 1 episode of loose dark stool.    SUBJECTIVE / INTERVAL HPI: Patient seen and examined at bedside. Patient denying chest pain. ROS + for intermittent SOB on exertion and dizziness w/ sitting up and sitting on side of bed. +Diarrhea. otherwise pt denies palpitations, cough. Patient denies fever, chills, HA, change in vision/hearing, N/V, abdominal pain, constipation, hematochezia/melena, dysuria, hematuria, new onset weakness/numbness, LE pain and/or swelling.    Remaining ROS negative       PHYSICAL EXAM:  General: NAD, resting comfortably in bed, speaking in full sentences on rm air  HEENT: NC/AT, (RIJ) swan cath in place, no bruising  Cardiovascular: RRR; +S1/S2, no r/m/g  Respiratory: CTA b/l; no W/R/R; no retractions or accessory muscle use  Gastrointestinal: soft, NT/ND; +BSx4  Extremities: WWP; no edema, clubbing or cyanosis. L A-line in place  Vascular: 2+ radial, DP/PT pulses B/L  Neurological: AAOx3; no focal deficits    VITAL SIGNS:  Vital Signs Last 24 Hrs  T(C): 35.9 (25 Feb 2021 09:05), Max: 36.7 (25 Feb 2021 05:16)  T(F): 96.7 (25 Feb 2021 09:05), Max: 98.1 (25 Feb 2021 05:16)  HR: 87 (25 Feb 2021 10:00) (74 - 99)  BP: 104/62 (25 Feb 2021 10:00) (73/46 - 107/71)  BP(mean): 78 (25 Feb 2021 10:00) (54 - 83)  RR: 20 (25 Feb 2021 10:00) (14 - 31)  SpO2: 96% (25 Feb 2021 10:00) (94% - 100%)      MEDICATIONS:  MEDICATIONS  (STANDING):  aspirin enteric coated 81 milliGRAM(s) Oral every 24 hours  atorvastatin 80 milliGRAM(s) Oral at bedtime  chlorhexidine 2% Cloths 1 Application(s) Topical <User Schedule>  enoxaparin Injectable 40 milliGRAM(s) SubCutaneous every 24 hours  melatonin 5 milliGRAM(s) Oral at bedtime  piperacillin/tazobactam IVPB.. 4.5 Gram(s) IV Intermittent every 6 hours  potassium chloride   Powder 20 milliEquivalent(s) Oral once  ticagrelor 90 milliGRAM(s) Oral every 12 hours  valsartan 40 milliGRAM(s) Oral every 24 hours    MEDICATIONS  (PRN):  acetaminophen   Tablet .. 650 milliGRAM(s) Oral every 6 hours PRN Temp greater or equal to 38C (100.4F), Mild Pain (1 - 3), Moderate Pain (4 - 6)  albuterol/ipratropium for Nebulization 3 milliLiter(s) Nebulizer every 6 hours PRN Shortness of Breath and/or Wheezing  guaiFENesin   Syrup  (Sugar-Free) 200 milliGRAM(s) Oral every 6 hours PRN Cough      ALLERGIES:  Allergies    No Known Allergies    Intolerances        LABS:                        10.7   16.13 )-----------( 896      ( 25 Feb 2021 07:59 )             33.3     02-25    133<L>  |  100  |  21  ----------------------------<  118<H>  3.8   |  22  |  0.73    Ca    9.4      25 Feb 2021 07:59  Phos  3.0     02-25  Mg     1.9     02-25    TPro  6.7  /  Alb  3.1<L>  /  TBili  0.4  /  DBili  x   /  AST  20  /  ALT  44  /  AlkPhos  131<H>  02-25    PTT - ( 25 Feb 2021 00:14 )  PTT:57.9 sec    CAPILLARY BLOOD GLUCOSE      POCT Blood Glucose.: 106 mg/dL (24 Feb 2021 23:33)      RADIOLOGY & ADDITIONAL TESTS: Reviewed. OVERNIGHT EVENTS: 2 episodes of loose dark stool     SUBJECTIVE / INTERVAL HPI: Patient seen and examined at bedside. Patient denying chest pain. ROS + for intermittent SOB on exertion and dizziness w/ sitting up and sitting on side of bed. +Diarrhea. otherwise pt denies palpitations, cough. Patient denies fever, chills, HA, change in vision/hearing, N/V, abdominal pain, constipation, hematochezia/melena, dysuria, hematuria, new onset weakness/numbness, LE pain and/or swelling.    Remaining ROS negative       PHYSICAL EXAM:  General: NAD, resting comfortably in bed, speaking in full sentences on rm air  HEENT: NC/AT, (RIJ) swan cath in place, no bruising  Cardiovascular: RRR; +S1/S2, no r/m/g  Respiratory: CTA b/l; no W/R/R; no retractions or accessory muscle use  Gastrointestinal: soft, NT/ND; +BSx4  Extremities: WWP; no edema, clubbing or cyanosis. L A-line in place  Vascular: 2+ radial, DP/PT pulses B/L  Neurological: AAOx3; no focal deficits    VITAL SIGNS:  Vital Signs Last 24 Hrs  T(C): 35.9 (25 Feb 2021 09:05), Max: 36.7 (25 Feb 2021 05:16)  T(F): 96.7 (25 Feb 2021 09:05), Max: 98.1 (25 Feb 2021 05:16)  HR: 87 (25 Feb 2021 10:00) (74 - 99)  BP: 104/62 (25 Feb 2021 10:00) (73/46 - 107/71)  BP(mean): 78 (25 Feb 2021 10:00) (54 - 83)  RR: 20 (25 Feb 2021 10:00) (14 - 31)  SpO2: 96% (25 Feb 2021 10:00) (94% - 100%)      MEDICATIONS:  MEDICATIONS  (STANDING):  aspirin enteric coated 81 milliGRAM(s) Oral every 24 hours  atorvastatin 80 milliGRAM(s) Oral at bedtime  chlorhexidine 2% Cloths 1 Application(s) Topical <User Schedule>  enoxaparin Injectable 40 milliGRAM(s) SubCutaneous every 24 hours  melatonin 5 milliGRAM(s) Oral at bedtime  piperacillin/tazobactam IVPB.. 4.5 Gram(s) IV Intermittent every 6 hours  potassium chloride   Powder 20 milliEquivalent(s) Oral once  ticagrelor 90 milliGRAM(s) Oral every 12 hours  valsartan 40 milliGRAM(s) Oral every 24 hours    MEDICATIONS  (PRN):  acetaminophen   Tablet .. 650 milliGRAM(s) Oral every 6 hours PRN Temp greater or equal to 38C (100.4F), Mild Pain (1 - 3), Moderate Pain (4 - 6)  albuterol/ipratropium for Nebulization 3 milliLiter(s) Nebulizer every 6 hours PRN Shortness of Breath and/or Wheezing  guaiFENesin   Syrup  (Sugar-Free) 200 milliGRAM(s) Oral every 6 hours PRN Cough      ALLERGIES:  Allergies    No Known Allergies    Intolerances        LABS:                        10.7   16.13 )-----------( 896      ( 25 Feb 2021 07:59 )             33.3     02-25    133<L>  |  100  |  21  ----------------------------<  118<H>  3.8   |  22  |  0.73    Ca    9.4      25 Feb 2021 07:59  Phos  3.0     02-25  Mg     1.9     02-25    TPro  6.7  /  Alb  3.1<L>  /  TBili  0.4  /  DBili  x   /  AST  20  /  ALT  44  /  AlkPhos  131<H>  02-25    PTT - ( 25 Feb 2021 00:14 )  PTT:57.9 sec    CAPILLARY BLOOD GLUCOSE      POCT Blood Glucose.: 106 mg/dL (24 Feb 2021 23:33)      RADIOLOGY & ADDITIONAL TESTS: Reviewed. OVERNIGHT EVENTS: 2 episodes of loose dark stool     SUBJECTIVE / INTERVAL HPI: Patient seen and examined at bedside. Patient denying chest pain. ROS + for intermittent SOB on exertion and dizziness w/ sitting up and sitting on side of bed. +Diarrhea. otherwise pt denies palpitations, cough. Patient denies fever, chills, HA, change in vision/hearing, N/V, abdominal pain, constipation, hematochezia/melena, dysuria, hematuria, new onset weakness/numbness, LE pain and/or swelling.    Remaining ROS negative       PHYSICAL EXAM:  General: NAD, resting comfortably in bed, speaking in full sentences on rm air  HEENT: NC/AT, RIJ line in place, no bruising  Cardiovascular: RRR; +S1/S2, no r/m/g  Respiratory: CTA b/l; no W/R/R; no retractions or accessory muscle use  Gastrointestinal: soft, NT/ND; +BSx4  Extremities: WWP; no edema, clubbing or cyanosis. L A-line in place  Vascular: 2+ radial, DP/PT pulses B/L  Neurological: AAOx3; no focal deficits    VITAL SIGNS:  Vital Signs Last 24 Hrs  T(C): 35.9 (25 Feb 2021 09:05), Max: 36.7 (25 Feb 2021 05:16)  T(F): 96.7 (25 Feb 2021 09:05), Max: 98.1 (25 Feb 2021 05:16)  HR: 87 (25 Feb 2021 10:00) (74 - 99)  BP: 104/62 (25 Feb 2021 10:00) (73/46 - 107/71)  BP(mean): 78 (25 Feb 2021 10:00) (54 - 83)  RR: 20 (25 Feb 2021 10:00) (14 - 31)  SpO2: 96% (25 Feb 2021 10:00) (94% - 100%)      MEDICATIONS:  MEDICATIONS  (STANDING):  aspirin enteric coated 81 milliGRAM(s) Oral every 24 hours  atorvastatin 80 milliGRAM(s) Oral at bedtime  chlorhexidine 2% Cloths 1 Application(s) Topical <User Schedule>  enoxaparin Injectable 40 milliGRAM(s) SubCutaneous every 24 hours  melatonin 5 milliGRAM(s) Oral at bedtime  piperacillin/tazobactam IVPB.. 4.5 Gram(s) IV Intermittent every 6 hours  potassium chloride   Powder 20 milliEquivalent(s) Oral once  ticagrelor 90 milliGRAM(s) Oral every 12 hours  valsartan 40 milliGRAM(s) Oral every 24 hours    MEDICATIONS  (PRN):  acetaminophen   Tablet .. 650 milliGRAM(s) Oral every 6 hours PRN Temp greater or equal to 38C (100.4F), Mild Pain (1 - 3), Moderate Pain (4 - 6)  albuterol/ipratropium for Nebulization 3 milliLiter(s) Nebulizer every 6 hours PRN Shortness of Breath and/or Wheezing  guaiFENesin   Syrup  (Sugar-Free) 200 milliGRAM(s) Oral every 6 hours PRN Cough      ALLERGIES:  Allergies    No Known Allergies    Intolerances        LABS:                        10.7   16.13 )-----------( 896      ( 25 Feb 2021 07:59 )             33.3     02-25    133<L>  |  100  |  21  ----------------------------<  118<H>  3.8   |  22  |  0.73    Ca    9.4      25 Feb 2021 07:59  Phos  3.0     02-25  Mg     1.9     02-25    TPro  6.7  /  Alb  3.1<L>  /  TBili  0.4  /  DBili  x   /  AST  20  /  ALT  44  /  AlkPhos  131<H>  02-25    PTT - ( 25 Feb 2021 00:14 )  PTT:57.9 sec    CAPILLARY BLOOD GLUCOSE      POCT Blood Glucose.: 106 mg/dL (24 Feb 2021 23:33)      RADIOLOGY & ADDITIONAL TESTS: Reviewed.

## 2021-02-26 PROBLEM — M48.9 SPONDYLOPATHY, UNSPECIFIED: Chronic | Status: ACTIVE | Noted: 2021-02-20

## 2021-02-26 LAB
ALBUMIN SERPL ELPH-MCNC: 3.7 G/DL — SIGNIFICANT CHANGE UP (ref 3.3–5)
ALP SERPL-CCNC: 127 U/L — HIGH (ref 40–120)
ALT FLD-CCNC: 49 U/L — HIGH (ref 10–45)
ANION GAP SERPL CALC-SCNC: 11 MMOL/L — SIGNIFICANT CHANGE UP (ref 5–17)
AST SERPL-CCNC: 29 U/L — SIGNIFICANT CHANGE UP (ref 10–40)
BASOPHILS # BLD AUTO: 0.11 K/UL — SIGNIFICANT CHANGE UP (ref 0–0.2)
BASOPHILS NFR BLD AUTO: 0.6 % — SIGNIFICANT CHANGE UP (ref 0–2)
BILIRUB SERPL-MCNC: 0.3 MG/DL — SIGNIFICANT CHANGE UP (ref 0.2–1.2)
BUN SERPL-MCNC: 19 MG/DL — SIGNIFICANT CHANGE UP (ref 7–23)
CALCIUM SERPL-MCNC: 9.8 MG/DL — SIGNIFICANT CHANGE UP (ref 8.4–10.5)
CHLORIDE SERPL-SCNC: 100 MMOL/L — SIGNIFICANT CHANGE UP (ref 96–108)
CO2 SERPL-SCNC: 24 MMOL/L — SIGNIFICANT CHANGE UP (ref 22–31)
CREAT SERPL-MCNC: 0.78 MG/DL — SIGNIFICANT CHANGE UP (ref 0.5–1.3)
CRP SERPL-MCNC: 2.28 MG/DL — HIGH (ref 0–0.4)
D DIMER BLD IA.RAPID-MCNC: 1812 NG/ML DDU — HIGH
EOSINOPHIL # BLD AUTO: 0.38 K/UL — SIGNIFICANT CHANGE UP (ref 0–0.5)
EOSINOPHIL NFR BLD AUTO: 2.2 % — SIGNIFICANT CHANGE UP (ref 0–6)
ERYTHROCYTE [SEDIMENTATION RATE] IN BLOOD: 106 MM/HR — HIGH
FERRITIN SERPL-MCNC: 771 NG/ML — HIGH (ref 30–400)
GLUCOSE BLDC GLUCOMTR-MCNC: 102 MG/DL — HIGH (ref 70–99)
GLUCOSE BLDC GLUCOMTR-MCNC: 119 MG/DL — HIGH (ref 70–99)
GLUCOSE SERPL-MCNC: 109 MG/DL — HIGH (ref 70–99)
HCT VFR BLD CALC: 38.3 % — LOW (ref 39–50)
HGB BLD-MCNC: 12.1 G/DL — LOW (ref 13–17)
IMM GRANULOCYTES NFR BLD AUTO: 4.1 % — HIGH (ref 0–1.5)
LYMPHOCYTES # BLD AUTO: 1.53 K/UL — SIGNIFICANT CHANGE UP (ref 1–3.3)
LYMPHOCYTES # BLD AUTO: 9 % — LOW (ref 13–44)
MAGNESIUM SERPL-MCNC: 1.9 MG/DL — SIGNIFICANT CHANGE UP (ref 1.6–2.6)
MCHC RBC-ENTMCNC: 29.4 PG — SIGNIFICANT CHANGE UP (ref 27–34)
MCHC RBC-ENTMCNC: 31.6 GM/DL — LOW (ref 32–36)
MCV RBC AUTO: 93.2 FL — SIGNIFICANT CHANGE UP (ref 80–100)
MONOCYTES # BLD AUTO: 0.98 K/UL — HIGH (ref 0–0.9)
MONOCYTES NFR BLD AUTO: 5.8 % — SIGNIFICANT CHANGE UP (ref 2–14)
NEUTROPHILS # BLD AUTO: 13.25 K/UL — HIGH (ref 1.8–7.4)
NEUTROPHILS NFR BLD AUTO: 78.3 % — HIGH (ref 43–77)
NRBC # BLD: 0 /100 WBCS — SIGNIFICANT CHANGE UP (ref 0–0)
PHOSPHATE SERPL-MCNC: 2.7 MG/DL — SIGNIFICANT CHANGE UP (ref 2.5–4.5)
PLATELET # BLD AUTO: 952 K/UL — HIGH (ref 150–400)
POTASSIUM SERPL-MCNC: 4.6 MMOL/L — SIGNIFICANT CHANGE UP (ref 3.5–5.3)
POTASSIUM SERPL-SCNC: 4.6 MMOL/L — SIGNIFICANT CHANGE UP (ref 3.5–5.3)
PROT SERPL-MCNC: 7.5 G/DL — SIGNIFICANT CHANGE UP (ref 6–8.3)
RBC # BLD: 4.11 M/UL — LOW (ref 4.2–5.8)
RBC # FLD: 13.6 % — SIGNIFICANT CHANGE UP (ref 10.3–14.5)
SODIUM SERPL-SCNC: 135 MMOL/L — SIGNIFICANT CHANGE UP (ref 135–145)
WBC # BLD: 16.94 K/UL — HIGH (ref 3.8–10.5)
WBC # FLD AUTO: 16.94 K/UL — HIGH (ref 3.8–10.5)

## 2021-02-26 PROCEDURE — 99291 CRITICAL CARE FIRST HOUR: CPT

## 2021-02-26 PROCEDURE — 71045 X-RAY EXAM CHEST 1 VIEW: CPT | Mod: 26

## 2021-02-26 RX ORDER — METOPROLOL TARTRATE 50 MG
12.5 TABLET ORAL
Refills: 0 | Status: DISCONTINUED | OUTPATIENT
Start: 2021-02-26 | End: 2021-02-27

## 2021-02-26 RX ORDER — TICAGRELOR 90 MG/1
1 TABLET ORAL
Qty: 60 | Refills: 2
Start: 2021-02-26 | End: 2021-05-26

## 2021-02-26 RX ORDER — BENZOCAINE AND MENTHOL 5; 1 G/100ML; G/100ML
1 LIQUID ORAL
Refills: 0 | Status: DISCONTINUED | OUTPATIENT
Start: 2021-02-26 | End: 2021-02-27

## 2021-02-26 RX ORDER — ATORVASTATIN CALCIUM 80 MG/1
1 TABLET, FILM COATED ORAL
Qty: 30 | Refills: 2
Start: 2021-02-26 | End: 2021-05-26

## 2021-02-26 RX ORDER — ATORVASTATIN CALCIUM 80 MG/1
1 TABLET, FILM COATED ORAL
Qty: 0 | Refills: 0 | DISCHARGE
Start: 2021-02-26

## 2021-02-26 RX ORDER — IBUPROFEN 200 MG
1 TABLET ORAL
Qty: 0 | Refills: 0 | DISCHARGE

## 2021-02-26 RX ORDER — MAGNESIUM SULFATE 500 MG/ML
1 VIAL (ML) INJECTION ONCE
Refills: 0 | Status: COMPLETED | OUTPATIENT
Start: 2021-02-26 | End: 2021-02-26

## 2021-02-26 RX ORDER — TICAGRELOR 90 MG/1
1 TABLET ORAL
Qty: 0 | Refills: 0 | DISCHARGE
Start: 2021-02-26

## 2021-02-26 RX ORDER — VALSARTAN 80 MG/1
1 TABLET ORAL
Qty: 0 | Refills: 0 | DISCHARGE
Start: 2021-02-26

## 2021-02-26 RX ORDER — METOPROLOL TARTRATE 50 MG
1 TABLET ORAL
Qty: 0 | Refills: 0 | DISCHARGE

## 2021-02-26 RX ORDER — METOPROLOL TARTRATE 50 MG
1 TABLET ORAL
Qty: 30 | Refills: 2
Start: 2021-02-26 | End: 2021-05-26

## 2021-02-26 RX ORDER — BENZOCAINE AND MENTHOL 5; 1 G/100ML; G/100ML
1 LIQUID ORAL
Qty: 1 | Refills: 0
Start: 2021-02-26 | End: 2021-03-07

## 2021-02-26 RX ORDER — ASPIRIN/CALCIUM CARB/MAGNESIUM 324 MG
1 TABLET ORAL
Qty: 0 | Refills: 0 | DISCHARGE
Start: 2021-02-26

## 2021-02-26 RX ORDER — ACETAMINOPHEN 500 MG
2 TABLET ORAL
Qty: 0 | Refills: 0 | DISCHARGE

## 2021-02-26 RX ORDER — ASPIRIN/CALCIUM CARB/MAGNESIUM 324 MG
1 TABLET ORAL
Qty: 30 | Refills: 2
Start: 2021-02-26 | End: 2021-05-26

## 2021-02-26 RX ORDER — VALSARTAN 80 MG/1
1 TABLET ORAL
Qty: 30 | Refills: 2
Start: 2021-02-26 | End: 2021-05-26

## 2021-02-26 RX ADMIN — TICAGRELOR 90 MILLIGRAM(S): 90 TABLET ORAL at 10:55

## 2021-02-26 RX ADMIN — PIPERACILLIN AND TAZOBACTAM 200 GRAM(S): 4; .5 INJECTION, POWDER, LYOPHILIZED, FOR SOLUTION INTRAVENOUS at 00:00

## 2021-02-26 RX ADMIN — Medication 650 MILLIGRAM(S): at 12:19

## 2021-02-26 RX ADMIN — Medication 200 MILLIGRAM(S): at 21:46

## 2021-02-26 RX ADMIN — Medication 100 GRAM(S): at 07:29

## 2021-02-26 RX ADMIN — PIPERACILLIN AND TAZOBACTAM 200 GRAM(S): 4; .5 INJECTION, POWDER, LYOPHILIZED, FOR SOLUTION INTRAVENOUS at 11:22

## 2021-02-26 RX ADMIN — TICAGRELOR 90 MILLIGRAM(S): 90 TABLET ORAL at 21:46

## 2021-02-26 RX ADMIN — BENZOCAINE AND MENTHOL 1 LOZENGE: 5; 1 LIQUID ORAL at 21:46

## 2021-02-26 RX ADMIN — ENOXAPARIN SODIUM 40 MILLIGRAM(S): 100 INJECTION SUBCUTANEOUS at 11:22

## 2021-02-26 RX ADMIN — VALSARTAN 40 MILLIGRAM(S): 80 TABLET ORAL at 11:25

## 2021-02-26 RX ADMIN — ATORVASTATIN CALCIUM 80 MILLIGRAM(S): 80 TABLET, FILM COATED ORAL at 21:46

## 2021-02-26 RX ADMIN — PIPERACILLIN AND TAZOBACTAM 200 GRAM(S): 4; .5 INJECTION, POWDER, LYOPHILIZED, FOR SOLUTION INTRAVENOUS at 07:25

## 2021-02-26 RX ADMIN — CHLORHEXIDINE GLUCONATE 1 APPLICATION(S): 213 SOLUTION TOPICAL at 07:25

## 2021-02-26 RX ADMIN — Medication 200 MILLIGRAM(S): at 12:19

## 2021-02-26 RX ADMIN — Medication 650 MILLIGRAM(S): at 21:46

## 2021-02-26 RX ADMIN — Medication 5 MILLIGRAM(S): at 21:46

## 2021-02-26 RX ADMIN — PIPERACILLIN AND TAZOBACTAM 200 GRAM(S): 4; .5 INJECTION, POWDER, LYOPHILIZED, FOR SOLUTION INTRAVENOUS at 18:25

## 2021-02-26 RX ADMIN — Medication 81 MILLIGRAM(S): at 07:24

## 2021-02-26 NOTE — PROGRESS NOTE ADULT - SUBJECTIVE AND OBJECTIVE BOX
***Note in progress***    OVERNIGHT EVENTS: NAEO    SUBJECTIVE / INTERVAL HPI: Patient seen and examined at bedside. Patient endorsing non productive cough. Pt  denies chest pain, SOB, palpitations. Patient denies fever, chills, HA, Dizziness, change in vision/hearing, N/V, abdominal pain, diarrhea, constipation, hematochezia/melena, dysuria, hematuria, new onset weakness/numbness, LE pain and/or swelling.    Remaining ROS negative       PHYSICAL EXAM:    General: NAD, resting comfortably in bed, speaking in full sentences on rm air  HEENT: NC/AT, RIJ line removed w/ gauze in place C/D NTT, no bruising or hematoma  Cardiovascular: RRR; +S1/S2, no r/m/g  Respiratory: CTA b/l; no W/R/R; no retractions or accessory muscle use  Gastrointestinal: soft, NT/ND; +BSx4  Extremities: WWP; no edema, clubbing or cyanosis. L A-line removed w/ gauze in place C/D NTT, no bruising or hematoma  Vascular: 2+ radial, DP/PT pulses B/L  Neurological: AAOx3; no focal deficits    VITAL SIGNS:  Vital Signs Last 24 Hrs  T(C): 36.3 (26 Feb 2021 06:42), Max: 36.9 (25 Feb 2021 14:01)  T(F): 97.3 (26 Feb 2021 06:42), Max: 98.5 (25 Feb 2021 22:00)  HR: 83 (26 Feb 2021 05:00) (80 - 97)  BP: 85/53 (26 Feb 2021 05:00) (85/53 - 104/62)  BP(mean): 65 (26 Feb 2021 05:00) (65 - 81)  RR: 23 (26 Feb 2021 05:00) (15 - 31)  SpO2: 98% (26 Feb 2021 05:00) (94% - 100%)      MEDICATIONS:  MEDICATIONS  (STANDING):  aspirin enteric coated 81 milliGRAM(s) Oral every 24 hours  atorvastatin 80 milliGRAM(s) Oral at bedtime  chlorhexidine 2% Cloths 1 Application(s) Topical <User Schedule>  enoxaparin Injectable 40 milliGRAM(s) SubCutaneous every 24 hours  melatonin 5 milliGRAM(s) Oral at bedtime  piperacillin/tazobactam IVPB.. 4.5 Gram(s) IV Intermittent every 6 hours  ticagrelor 90 milliGRAM(s) Oral every 12 hours  valsartan 40 milliGRAM(s) Oral every 24 hours    MEDICATIONS  (PRN):  acetaminophen   Tablet .. 650 milliGRAM(s) Oral every 6 hours PRN Temp greater or equal to 38C (100.4F), Mild Pain (1 - 3), Moderate Pain (4 - 6)  albuterol/ipratropium for Nebulization 3 milliLiter(s) Nebulizer every 6 hours PRN Shortness of Breath and/or Wheezing  guaiFENesin   Syrup  (Sugar-Free) 200 milliGRAM(s) Oral every 6 hours PRN Cough      ALLERGIES:  Allergies    No Known Allergies    Intolerances        LABS:                        12.1   16.94 )-----------( 952      ( 26 Feb 2021 04:12 )             38.3     02-26    135  |  100  |  19  ----------------------------<  109<H>  4.6   |  24  |  0.78    Ca    9.8      26 Feb 2021 04:12  Phos  2.7     02-26  Mg     1.9     02-26    TPro  7.5  /  Alb  3.7  /  TBili  0.3  /  DBili  x   /  AST  29  /  ALT  49<H>  /  AlkPhos  127<H>  02-26    PTT - ( 25 Feb 2021 00:14 )  PTT:57.9 sec    CAPILLARY BLOOD GLUCOSE      POCT Blood Glucose.: 102 mg/dL (26 Feb 2021 05:35)      RADIOLOGY & ADDITIONAL TESTS: Reviewed. OVERNIGHT EVENTS: NAEO    SUBJECTIVE / INTERVAL HPI: Patient seen and examined at bedside. Patient endorsing non productive cough. Pt  denies chest pain, SOB, palpitations. Patient denies fever, chills, HA, Dizziness, change in vision/hearing, N/V, abdominal pain, diarrhea, constipation, hematochezia/melena, dysuria, hematuria, new onset weakness/numbness, LE pain and/or swelling.    Remaining ROS negative       PHYSICAL EXAM:    General: NAD, resting comfortably in bed, speaking in full sentences on rm air  HEENT: NC/AT, RIJ line removed w/ gauze in place C/D NTT, no bruising or hematoma  Cardiovascular: RRR; +S1/S2, no r/m/g  Respiratory: CTA b/l; no W/R/R; no retractions or accessory muscle use  Gastrointestinal: soft, NT/ND; +BSx4  Extremities: WWP; no edema, clubbing or cyanosis. L A-line removed w/ gauze in place C/D NTT, no bruising or hematoma  Vascular: 2+ radial, DP/PT pulses B/L  Neurological: AAOx3; no focal deficits    VITAL SIGNS:  Vital Signs Last 24 Hrs  T(C): 36.3 (26 Feb 2021 06:42), Max: 36.9 (25 Feb 2021 14:01)  T(F): 97.3 (26 Feb 2021 06:42), Max: 98.5 (25 Feb 2021 22:00)  HR: 83 (26 Feb 2021 05:00) (80 - 97)  BP: 85/53 (26 Feb 2021 05:00) (85/53 - 104/62)  BP(mean): 65 (26 Feb 2021 05:00) (65 - 81)  RR: 23 (26 Feb 2021 05:00) (15 - 31)  SpO2: 98% (26 Feb 2021 05:00) (94% - 100%)      MEDICATIONS:  MEDICATIONS  (STANDING):  aspirin enteric coated 81 milliGRAM(s) Oral every 24 hours  atorvastatin 80 milliGRAM(s) Oral at bedtime  chlorhexidine 2% Cloths 1 Application(s) Topical <User Schedule>  enoxaparin Injectable 40 milliGRAM(s) SubCutaneous every 24 hours  melatonin 5 milliGRAM(s) Oral at bedtime  piperacillin/tazobactam IVPB.. 4.5 Gram(s) IV Intermittent every 6 hours  ticagrelor 90 milliGRAM(s) Oral every 12 hours  valsartan 40 milliGRAM(s) Oral every 24 hours    MEDICATIONS  (PRN):  acetaminophen   Tablet .. 650 milliGRAM(s) Oral every 6 hours PRN Temp greater or equal to 38C (100.4F), Mild Pain (1 - 3), Moderate Pain (4 - 6)  albuterol/ipratropium for Nebulization 3 milliLiter(s) Nebulizer every 6 hours PRN Shortness of Breath and/or Wheezing  guaiFENesin   Syrup  (Sugar-Free) 200 milliGRAM(s) Oral every 6 hours PRN Cough      ALLERGIES:  Allergies    No Known Allergies    Intolerances        LABS:                        12.1   16.94 )-----------( 952      ( 26 Feb 2021 04:12 )             38.3     02-26    135  |  100  |  19  ----------------------------<  109<H>  4.6   |  24  |  0.78    Ca    9.8      26 Feb 2021 04:12  Phos  2.7     02-26  Mg     1.9     02-26    TPro  7.5  /  Alb  3.7  /  TBili  0.3  /  DBili  x   /  AST  29  /  ALT  49<H>  /  AlkPhos  127<H>  02-26    PTT - ( 25 Feb 2021 00:14 )  PTT:57.9 sec    CAPILLARY BLOOD GLUCOSE      POCT Blood Glucose.: 102 mg/dL (26 Feb 2021 05:35)      RADIOLOGY & ADDITIONAL TESTS: Reviewed.

## 2021-02-26 NOTE — PROGRESS NOTE ADULT - ASSESSMENT
Patient is a 61yo M PMH chronic low back pain with sciatica, arthritis, psoriasis presenting with 1.5 weeks of shortness of breath and chest pain. Found to be hypoxic and have significant pulmonary edema with new onset HF (TTE EF 20-25%), b/l lung opacities with concern for PNA (pt meeting 3/4 SIRS criteria) and late presenting STEMI (trops 0.67, EKG with septal q waves and ST elevations). In the ED, pt was Aspirin/Plavix loaded and started on a Heparin gtt. Patient will be admitted to the CCU for further management.     Neuro  AAOx3     Cardiovascular   #ACS   Presenting with diffuse chest pain. Trops 0.67 (repeat 0.52). EKG with septal q waves and ST elevation c/f late presenting STEMI. No known CAD. S/p Aspirin 325mg, Plavix 600mg, Heparin bolus. TTE  this admission w/ Severely reduced left ventricular systolic function and regional wall motion abnormalities consistent with ischemic heart disease. LV EF 24%.  -s/p hep bolus. ptt subtherapeutic hep increased from 18.5 to 20 (goal 53-73)  s/p coronary angiogram 2/23, LHC, RHC, [place] Todd insertion (RIJ) (removed 2/25). Cath revealing mid LAD 99% subtotal occlusion, LAD D2: ostial 90% stenosis. LV gram: EF 15%, anterior apical wall akinesis.   -initally planned for staged Impella assisted PCI of mid LAD/D2 bifurcation after viability study/inflammatory markers improve.   -Cardiac MRI revealing nonviable myocardium in the LAD territory.  -dc'ed Heparin gtt 2/25  PLAN  -c/w Aspirin 81mg  -s/p Plavix 75mg and Brilinta 180mg, c/w Brilinta 90mg bid  (started 2/22)  -c/w Atorvastatin 80mg        #New onset CHF   Presenting with SOB. Pt found to be hypoxic and CXR showing fluid overload. BNP 9234. S/p Lasix 60mg in ED. Likely in setting of ACS. Pt not responding appropriately to 60 IV Lasix x 2 requiring 5mg metolazone, 2mg Bumex STAT then 1mg/hr Bumex infusion.   -bumex dc'd (2/22) pt w/ improved volume status. AM of 2/22 pt found to be very lethargic. ABG showed pH of 7.54 Bicarb of 35 from 26 on admission.  -s/p coronary angiogram w/ placement of swan todd cath (RIJ) to monitor CVP - (goal pressure bet 4-5)- 2/23  PLAN  -cont to hold further diuresis, IVF as needed  -s/p acetazolamide 250mg IVPB x2. ABG improved. mentating well today. d/c'd acetazolamide   -sating well on RM air  -c/w volsartan 40mg QD w/ hold parameters SBP <100       Pulmonary   #Acute hypoxic respiratory failure - RESOLVED  On admission hypoxic to 87%. Improvement to 93% on 3L. Likely 2/2 PNA (COVID vs bacterial) and fluid overloaded in setting of ACS w/ c/f for new onset HF. S/p Solumedrol 125mg, Duoneb x 1.   -Supplemental oxygen prn. Pt w. inc O2 NC increased from 3 to 6L sating in low 90's, w/ increased WOB requiring Bumex drip.  -S/p Solumedrol 125mg   -bumex dc'd (2/22)  PLAN  -Duonebs q6 prn  -Management for PNA below  -trend CMP, replete lytes and monitor LFTs to assess for organ perfusion  -sating well on RM air    #PNA, CAP vs COVID  Management as below     ID  #Sepsis 2/2 CAP   Pt meeting 3/4 SIRS criteria (tachycardic, tachypneic, leukocytosis). Was started on CAP tx at urgent care. CXR showing pulmonary edema with suspected b/l opacities. Procalcitonin 3.36. s/p Azithromycin 500mg and Ceftriaxone 1g in ED. Pt initially dc'd abx due to low suspicion for infection w/ SIRS + leukocytosis presumed to be 2/2 to CHF exacerbation.  -Pt spiked new fever of 103.6 rectal, tachycardic to 131 and tachypneic to 26. Pt restarted on vanc + zosyn. w/ source still likely to be PNA  -pt w/ increasing white count   -UA negative, Urine strep and legi negative    -Patient also COVID PCR neg x3 but positive for COVID antibodies. Patient w/ high fevers, increased WBC however hemodynamically stable s/p bumex gtt which increases suspicion for COVID PNA vs less likely sepsis 2/2 bacterial infection given patient maintaining pressures i/s/o diuresis (would expect patient to go into septic shock if bacterial PNA)  PLAN  -UA (2/29) neg  -BCx (2/20 AM and PM) NGTD  -sputum cultures growing Melissa Dubliniensis (rare opportunistic yeast). Pt has never been HIV tested. states he has not been sexually active for 10+ years, denies hx of intercourse w/ men. denies hx of IV drug use. HIV screen nonreactive. HIV neg. Hep panel unremarkable  -pt no longer spiking fevers, down trending WBC on zosyn, no antifungals given at this time.   -MRSA swab neg -> vanc dc'd  -Lactate normalized  -rapid covid NEG x4 and repeat COVID swab and RVP from 2/24 NEG  PLAN  -C/w zosyn 4.5g q6h x7 days (2/20-2/26)  -Will consider deescalating or discontinuing ABX if no culture data returns  -SONIA (NEG)   -f/u COVID IgA  -f/u ANCA (if negative will f/u w/ HLA B27, pt w/ hx of cervical arthritis and psoriasis)  -Per ID, pt w/ high suspicion for covid. will remain on COVID tele unit under CCU service     GI  #Mild transaminitis diff include COVID induced (so far covid neg) vs other hepatobiliary infection (NEG murphys sign, and not presenting w/ transaminitis in setting of fever) vs medication induced (acetazolamide and bumex) vs hypoperfusion injury (pt aggressively diuresed)  -improving s/p IVF  -elevated alk phos likely elevated inflammatory marker   -trend cmp, cont to monitor    Renal   #Metabolic alkalosis -RESOLVED  pt w/ increased lethargy noted on AM of 2/22. ABG at the time showed pH of 7.53 and Bicarb of 35 from 26 on admission, likely 2/2 contraction alkalosis from aggressive diuresis  PLAN  -holding diuresis   -s/p acetazolamide 250mg IVPB x2. ABG improved. mentating well today. d/c'd acetazolamide   -s/p IVF, ABG improved  -monitor mental status and I/O's    Endo  #Hyperglycemia   Glucose 124 on admission. No known h/o DM or Pre-DM   -HgbA1c wnl  PLAN  -c/w ISS     Heme   #Anemia   Found to have an Hgb of 11.6. Per HIE labs, baseline Hgb is 15.   -iron studies c/w anemia of chronic disease  PLAN  -in setting of sepsis will hold iron tx for now   -Maintain active T&S    #Thrombocytosis  -Plts 593 on admission, likely reactive in setting of ACS and PNA although per HIE labs Plts 412 in 1/2020.   PLAN  -Monitor CBC     Derm   #Psoriasis   Not on any medications     MSK   #Arthritis   -Tylenol 650mg prn     Allergy  #Seasonal allergies   -C/w home Claritin     F: none  E: replete K <4, Mg <2  N: DASH/TLC, fluid restriction    GI Ppx: None   DVT Ppx: Hep gtt  Code status: FULL   Dispo: CCU    Patient is a 59yo M PMH chronic low back pain with sciatica, arthritis, psoriasis presenting with 1.5 weeks of shortness of breath and chest pain. Found to be hypoxic and have significant pulmonary edema with new onset HF (TTE EF 20-25%), b/l lung opacities with concern for PNA (pt meeting 3/4 SIRS criteria) and late presenting STEMI (trops 0.67, EKG with septal q waves and ST elevations). In the ED, pt was Aspirin/Plavix loaded and started on a Heparin gtt. Patient will be admitted to the CCU for further management.     Neuro  AAOx3     Cardiovascular   #ACS   Presenting with diffuse chest pain. Trops 0.67 (repeat 0.52). EKG with septal q waves and ST elevation c/f late presenting STEMI. No known CAD. S/p Aspirin 325mg, Plavix 600mg, Heparin bolus. TTE  this admission w/ Severely reduced left ventricular systolic function and regional wall motion abnormalities consistent with ischemic heart disease. LV EF 24%.  -s/p hep bolus. ptt subtherapeutic hep increased from 18.5 to 20 (goal 53-73)  s/p coronary angiogram 2/23, LHC, RHC, [place] Todd insertion (RIJ) (removed 2/25). Cath revealing mid LAD 99% subtotal occlusion, LAD D2: ostial 90% stenosis. LV gram: EF 15%, anterior apical wall akinesis.   -initally planned for staged Impella assisted PCI of mid LAD/D2 bifurcation after viability study/inflammatory markers improve.   -Cardiac MRI revealing nonviable myocardium in the LAD territory.  -dc'ed Heparin gtt 2/25  PLAN  -c/w Aspirin 81mg  -s/p Plavix 75mg and Brilinta 180mg, c/w Brilinta 90mg bid  (started 2/22)  -c/w Atorvastatin 80mg        #New onset CHF   Presenting with SOB. Pt found to be hypoxic and CXR showing fluid overload. BNP 9234. S/p Lasix 60mg in ED. Likely in setting of ACS. Pt not responding appropriately to 60 IV Lasix x 2 requiring 5mg metolazone, 2mg Bumex STAT then 1mg/hr Bumex infusion.   -bumex dc'd (2/22) pt w/ improved volume status. AM of 2/22 pt found to be very lethargic. ABG showed pH of 7.54 Bicarb of 35 from 26 on admission.  -s/p coronary angiogram w/ placement of swan todd cath (RIJ) to monitor CVP - (goal pressure bet 4-5)- 2/23  PLAN  -cont to hold further diuresis, IVF as needed  -s/p acetazolamide 250mg IVPB x2. ABG improved. mentating well today. d/c'd acetazolamide   -sating well on RM air  -c/w volsartan 40mg QD w/ hold parameters SBP <100   -starting metoprolol tartrate 12.5mg BID w/ hold parameters SBP <100  (will consider switching to metoprolol succinate 25mg QD on DC)  -PT eval pending       Pulmonary   #Acute hypoxic respiratory failure - RESOLVED  On admission hypoxic to 87%. Improvement to 93% on 3L. Likely 2/2 PNA (COVID vs bacterial) and fluid overloaded in setting of ACS w/ c/f for new onset HF. S/p Solumedrol 125mg, Duoneb x 1.   -Supplemental oxygen prn. Pt w. inc O2 NC increased from 3 to 6L sating in low 90's, w/ increased WOB requiring Bumex drip.  -S/p Solumedrol 125mg   -bumex dc'd (2/22)  PLAN  -Duonebs q6 prn  -Management for PNA below  -trend CMP, replete lytes and monitor LFTs to assess for organ perfusion  -sating well on RM air    #PNA, CAP vs COVID  Management as below     ID  #Sepsis 2/2 CAP   Pt meeting 3/4 SIRS criteria (tachycardic, tachypneic, leukocytosis). Was started on CAP tx at urgent care. CXR showing pulmonary edema with suspected b/l opacities. Procalcitonin 3.36. s/p Azithromycin 500mg and Ceftriaxone 1g in ED. Pt initially dc'd abx due to low suspicion for infection w/ SIRS + leukocytosis presumed to be 2/2 to CHF exacerbation.  -Pt spiked new fever of 103.6 rectal, tachycardic to 131 and tachypneic to 26. Pt restarted on vanc + zosyn. w/ source still likely to be PNA  -pt w/ increasing white count   -UA negative, Urine strep and legi negative    -Patient also COVID PCR neg x3 but positive for COVID antibodies. Patient w/ high fevers, increased WBC however hemodynamically stable s/p bumex gtt which increases suspicion for COVID PNA vs less likely sepsis 2/2 bacterial infection given patient maintaining pressures i/s/o diuresis (would expect patient to go into septic shock if bacterial PNA)  PLAN  -UA (2/29) neg  -BCx (2/20 AM and PM) NGTD  -sputum cultures growing Melissa Dubliniensis (rare opportunistic yeast). Pt has never been HIV tested. states he has not been sexually active for 10+ years, denies hx of intercourse w/ men. denies hx of IV drug use. HIV screen nonreactive. HIV neg. Hep panel unremarkable  -pt no longer spiking fevers, down trending WBC on zosyn, no antifungals given at this time.   -MRSA swab neg -> vanc dc'd  -Lactate normalized  -rapid covid NEG x4 and repeat COVID swab and RVP from 2/24 NEG  PLAN  -C/w zosyn 4.5g q6h x7 days (2/20-2/26)  -Will consider deescalating or discontinuing ABX if no culture data returns  -SONIA (NEG)   -f/u COVID IgA  -f/u ANCA (if negative will f/u w/ HLA B27, pt w/ hx of cervical arthritis and psoriasis)  -Per ID, pt w/ high suspicion for covid. will remain on COVID tele unit under CCU service     GI  #Mild transaminitis diff include COVID induced (so far covid neg) vs other hepatobiliary infection (NEG murphys sign, and not presenting w/ transaminitis in setting of fever) vs medication induced (acetazolamide and bumex) vs hypoperfusion injury (pt aggressively diuresed)  -improving s/p IVF  -elevated alk phos likely elevated inflammatory marker   -trend cmp, cont to monitor    Renal   #Metabolic alkalosis -RESOLVED  pt w/ increased lethargy noted on AM of 2/22. ABG at the time showed pH of 7.53 and Bicarb of 35 from 26 on admission, likely 2/2 contraction alkalosis from aggressive diuresis  PLAN  -holding diuresis   -s/p acetazolamide 250mg IVPB x2. ABG improved. mentating well today. d/c'd acetazolamide   -s/p IVF, ABG improved  -monitor mental status and I/O's    Endo  #Hyperglycemia   Glucose 124 on admission. No known h/o DM or Pre-DM   -HgbA1c wnl  PLAN  -c/w ISS     Heme   #Anemia   Found to have an Hgb of 11.6. Per HIE labs, baseline Hgb is 15.   -iron studies c/w anemia of chronic disease  PLAN  -in setting of sepsis will hold iron tx for now   -Maintain active T&S    #Thrombocytosis  -Plts 593 on admission, likely reactive in setting of ACS and PNA although per HIE labs Plts 412 in 1/2020.   PLAN  -Monitor CBC     Derm   #Psoriasis   Not on any medications     MSK   #Arthritis   -Tylenol 650mg prn     Allergy  #Seasonal allergies   -C/w home Claritin     F: none  E: replete K <4, Mg <2  N: DASH/TLC, fluid restriction    GI Ppx: None   DVT Ppx: Hep gtt  Code status: FULL   Dispo: CCU

## 2021-02-26 NOTE — PROGRESS NOTE ADULT - ATTENDING COMMENTS
Pt is a 61 y/o man c sciatica, arthritis, psoriasis who p/w chest pain and dyspnea found to be hypoxic with severe pulmonary edema.  Presentation c/w ACS (late presenting STEMI?) with also SIRS.    TTE: EF 25%, apical severe hypokinesis  Cath: LVEDP 2  CI 2.5  PCWP 2  99% mid LAD at D2 bifurcation    cMRI: no viability in ant /apical region    afeb, 100/65, HR 75, 95% NC      Mathieu 0.67  BNP 9232  CRP 31--> 15 --> 2.8    D-dimer 464 --> 873 --> 1812  Ferritin 1200 --> 600    WBC 22 --> 16.9  Hgb 12.1 --> 10  Plt 727 --> 906 --> 896 --> 952    Na 133  Cl 89 --> 100  Cr 0.92 --> 1.01 -->  0.84    SONIA -ve    Cx: C dulienses  CXR: much improved, less pulm edema    - pt with ACS and acute systolic CHF, ant MI  - agree to cont asa, brillanta & heparin in pt  - cath showed severe LAD dx with very low EF  - pt s viability, cont medical mgmt s PCI  - cont lipitor  - start arb/b-blocker for now as tolerated  - pt with lekocytosis, thrombocytosis with increased inflammatory markers, and markers have decreased  - pt COVID -ve * >5  - pt w/ acute hypoxemic resp failure, cont wean O2.   - will do w/u  for autoimmune disease, HIV and COVID -ve, -ve for RVP and SONIA .  - ID suspects COVID given Abs, fevers and inflammatory markers

## 2021-02-26 NOTE — PHYSICAL THERAPY INITIAL EVALUATION ADULT - GENERAL OBSERVATIONS, REHAB EVAL
Pt found semi supine in bed in NAD, +tele (ok to come off monitor per RN), +hep lock, agreeable to PT Eval and cleared by ASAD Wadsworth and MD Brooks. all other ROS negative except as per HPI

## 2021-02-27 ENCOUNTER — TRANSCRIPTION ENCOUNTER (OUTPATIENT)
Age: 61
End: 2021-02-27

## 2021-02-27 VITALS — TEMPERATURE: 98 F

## 2021-02-27 LAB
ALBUMIN SERPL ELPH-MCNC: 3.5 G/DL — SIGNIFICANT CHANGE UP (ref 3.3–5)
ALP SERPL-CCNC: 124 U/L — HIGH (ref 40–120)
ALT FLD-CCNC: 50 U/L — HIGH (ref 10–45)
ANION GAP SERPL CALC-SCNC: 12 MMOL/L — SIGNIFICANT CHANGE UP (ref 5–17)
AST SERPL-CCNC: 24 U/L — SIGNIFICANT CHANGE UP (ref 10–40)
AUTO DIFF PNL BLD: NEGATIVE — SIGNIFICANT CHANGE UP
BASOPHILS # BLD AUTO: 0.15 K/UL — SIGNIFICANT CHANGE UP (ref 0–0.2)
BASOPHILS NFR BLD AUTO: 0.8 % — SIGNIFICANT CHANGE UP (ref 0–2)
BILIRUB SERPL-MCNC: 0.3 MG/DL — SIGNIFICANT CHANGE UP (ref 0.2–1.2)
BUN SERPL-MCNC: 17 MG/DL — SIGNIFICANT CHANGE UP (ref 7–23)
C-ANCA SER-ACNC: NEGATIVE — SIGNIFICANT CHANGE UP
CALCIUM SERPL-MCNC: 9.5 MG/DL — SIGNIFICANT CHANGE UP (ref 8.4–10.5)
CHLORIDE SERPL-SCNC: 102 MMOL/L — SIGNIFICANT CHANGE UP (ref 96–108)
CO2 SERPL-SCNC: 22 MMOL/L — SIGNIFICANT CHANGE UP (ref 22–31)
CREAT SERPL-MCNC: 0.82 MG/DL — SIGNIFICANT CHANGE UP (ref 0.5–1.3)
CRP SERPL-MCNC: 2.97 MG/DL — HIGH (ref 0–0.4)
D DIMER BLD IA.RAPID-MCNC: 1250 NG/ML DDU — HIGH
EOSINOPHIL # BLD AUTO: 0.36 K/UL — SIGNIFICANT CHANGE UP (ref 0–0.5)
EOSINOPHIL NFR BLD AUTO: 1.9 % — SIGNIFICANT CHANGE UP (ref 0–6)
ERYTHROCYTE [SEDIMENTATION RATE] IN BLOOD: 10 MM/HR — SIGNIFICANT CHANGE UP
FERRITIN SERPL-MCNC: 657 NG/ML — HIGH (ref 30–400)
GLUCOSE SERPL-MCNC: 95 MG/DL — SIGNIFICANT CHANGE UP (ref 70–99)
HCT VFR BLD CALC: 36.4 % — LOW (ref 39–50)
HGB BLD-MCNC: 11.6 G/DL — LOW (ref 13–17)
IMM GRANULOCYTES NFR BLD AUTO: 3.9 % — HIGH (ref 0–1.5)
LYMPHOCYTES # BLD AUTO: 1.97 K/UL — SIGNIFICANT CHANGE UP (ref 1–3.3)
LYMPHOCYTES # BLD AUTO: 10.2 % — LOW (ref 13–44)
MAGNESIUM SERPL-MCNC: 1.8 MG/DL — SIGNIFICANT CHANGE UP (ref 1.6–2.6)
MCHC RBC-ENTMCNC: 29.8 PG — SIGNIFICANT CHANGE UP (ref 27–34)
MCHC RBC-ENTMCNC: 31.9 GM/DL — LOW (ref 32–36)
MCV RBC AUTO: 93.6 FL — SIGNIFICANT CHANGE UP (ref 80–100)
MONOCYTES # BLD AUTO: 1.34 K/UL — HIGH (ref 0–0.9)
MONOCYTES NFR BLD AUTO: 7 % — SIGNIFICANT CHANGE UP (ref 2–14)
NEUTROPHILS # BLD AUTO: 14.67 K/UL — HIGH (ref 1.8–7.4)
NEUTROPHILS NFR BLD AUTO: 76.2 % — SIGNIFICANT CHANGE UP (ref 43–77)
NRBC # BLD: 0 /100 WBCS — SIGNIFICANT CHANGE UP (ref 0–0)
P-ANCA SER-ACNC: NEGATIVE — SIGNIFICANT CHANGE UP
PHOSPHATE SERPL-MCNC: 3.6 MG/DL — SIGNIFICANT CHANGE UP (ref 2.5–4.5)
PLATELET # BLD AUTO: 965 K/UL — HIGH (ref 150–400)
POTASSIUM SERPL-MCNC: 4.2 MMOL/L — SIGNIFICANT CHANGE UP (ref 3.5–5.3)
POTASSIUM SERPL-SCNC: 4.2 MMOL/L — SIGNIFICANT CHANGE UP (ref 3.5–5.3)
PROT SERPL-MCNC: 7.7 G/DL — SIGNIFICANT CHANGE UP (ref 6–8.3)
RBC # BLD: 3.89 M/UL — LOW (ref 4.2–5.8)
RBC # FLD: 13.9 % — SIGNIFICANT CHANGE UP (ref 10.3–14.5)
SODIUM SERPL-SCNC: 136 MMOL/L — SIGNIFICANT CHANGE UP (ref 135–145)
WBC # BLD: 19.25 K/UL — HIGH (ref 3.8–10.5)
WBC # FLD AUTO: 19.25 K/UL — HIGH (ref 3.8–10.5)

## 2021-02-27 PROCEDURE — 71045 X-RAY EXAM CHEST 1 VIEW: CPT | Mod: 26

## 2021-02-27 PROCEDURE — 99239 HOSP IP/OBS DSCHRG MGMT >30: CPT

## 2021-02-27 RX ORDER — ATORVASTATIN CALCIUM 80 MG/1
1 TABLET, FILM COATED ORAL
Qty: 30 | Refills: 2
Start: 2021-02-27 | End: 2021-05-27

## 2021-02-27 RX ORDER — TICAGRELOR 90 MG/1
1 TABLET ORAL
Qty: 60 | Refills: 2
Start: 2021-02-27 | End: 2021-05-27

## 2021-02-27 RX ORDER — METOPROLOL TARTRATE 50 MG
1 TABLET ORAL
Qty: 30 | Refills: 2
Start: 2021-02-27 | End: 2021-05-27

## 2021-02-27 RX ORDER — MAGNESIUM SULFATE 500 MG/ML
1 VIAL (ML) INJECTION ONCE
Refills: 0 | Status: DISCONTINUED | OUTPATIENT
Start: 2021-02-27 | End: 2021-02-27

## 2021-02-27 RX ORDER — BENZOCAINE AND MENTHOL 5; 1 G/100ML; G/100ML
1 LIQUID ORAL
Qty: 1 | Refills: 0
Start: 2021-02-27 | End: 2021-03-08

## 2021-02-27 RX ORDER — MAGNESIUM SULFATE 500 MG/ML
1 VIAL (ML) INJECTION ONCE
Refills: 0 | Status: COMPLETED | OUTPATIENT
Start: 2021-02-27 | End: 2021-02-27

## 2021-02-27 RX ADMIN — PIPERACILLIN AND TAZOBACTAM 200 GRAM(S): 4; .5 INJECTION, POWDER, LYOPHILIZED, FOR SOLUTION INTRAVENOUS at 13:06

## 2021-02-27 RX ADMIN — Medication 650 MILLIGRAM(S): at 05:28

## 2021-02-27 RX ADMIN — Medication 200 MILLIGRAM(S): at 12:56

## 2021-02-27 RX ADMIN — Medication 81 MILLIGRAM(S): at 05:28

## 2021-02-27 RX ADMIN — PIPERACILLIN AND TAZOBACTAM 200 GRAM(S): 4; .5 INJECTION, POWDER, LYOPHILIZED, FOR SOLUTION INTRAVENOUS at 05:28

## 2021-02-27 RX ADMIN — PIPERACILLIN AND TAZOBACTAM 200 GRAM(S): 4; .5 INJECTION, POWDER, LYOPHILIZED, FOR SOLUTION INTRAVENOUS at 00:11

## 2021-02-27 RX ADMIN — ENOXAPARIN SODIUM 40 MILLIGRAM(S): 100 INJECTION SUBCUTANEOUS at 12:55

## 2021-02-27 RX ADMIN — CHLORHEXIDINE GLUCONATE 1 APPLICATION(S): 213 SOLUTION TOPICAL at 05:28

## 2021-02-27 RX ADMIN — Medication 650 MILLIGRAM(S): at 13:40

## 2021-02-27 RX ADMIN — TICAGRELOR 90 MILLIGRAM(S): 90 TABLET ORAL at 12:55

## 2021-02-27 RX ADMIN — Medication 200 MILLIGRAM(S): at 05:28

## 2021-02-27 NOTE — DISCHARGE NOTE NURSING/CASE MANAGEMENT/SOCIAL WORK - NSDCFUADDAPPT_GEN_ALL_CORE_FT
You appointment with your cardiologist Mary Plaza is scheduled for   3/4/21 @ 1pm  [address]  [phone]    Also please call to schedule an appointment with your PCP to be seen within the next 1-2 weeks. You can either schedule an appointment with your own PCP Dr Osborn  or you can call our [facility] at [address] [place] [address] to schedule an appointment   Address: [address] [place], [place] [address], [place] [address]  Phone: [phone]

## 2021-02-27 NOTE — DISCHARGE NOTE NURSING/CASE MANAGEMENT/SOCIAL WORK - PATIENT PORTAL LINK FT
You can access the FollowMyHealth Patient Portal offered by [facility] by registering at the following website: [email or web address]. By joining [facility]’s FollowMyHealth portal, you will also be able to view your health information using other applications (apps) compatible with our system.

## 2021-02-27 NOTE — PROGRESS NOTE ADULT - SUBJECTIVE AND OBJECTIVE BOX
*** in progress ***    Medicine Progress Note    Patient is a 60y old  Male who presents with a chief complaint of ACS, CAP (26 Feb 2021 08:00)      SUBJECTIVE / OVERNIGHT EVENTS:    MEDICATIONS  (STANDING):  aspirin enteric coated 81 milliGRAM(s) Oral every 24 hours  atorvastatin 80 milliGRAM(s) Oral at bedtime  chlorhexidine 2% Cloths 1 Application(s) Topical <User Schedule>  enoxaparin Injectable 40 milliGRAM(s) SubCutaneous every 24 hours  melatonin 5 milliGRAM(s) Oral at bedtime  metoprolol tartrate 12.5 milliGRAM(s) Oral two times a day  piperacillin/tazobactam IVPB.. 4.5 Gram(s) IV Intermittent every 6 hours  ticagrelor 90 milliGRAM(s) Oral every 12 hours  valsartan 40 milliGRAM(s) Oral every 24 hours    MEDICATIONS  (PRN):  acetaminophen   Tablet .. 650 milliGRAM(s) Oral every 6 hours PRN Temp greater or equal to 38C (100.4F), Mild Pain (1 - 3), Moderate Pain (4 - 6)  albuterol/ipratropium for Nebulization 3 milliLiter(s) Nebulizer every 6 hours PRN Shortness of Breath and/or Wheezing  benzocaine 15 mG/menthol 3.6 mG (Sugar-Free) Lozenge 1 Lozenge Oral every 1 hour PRN Sore Throat  benzonatate 100 milliGRAM(s) Oral three times a day PRN Cough  guaiFENesin   Syrup  (Sugar-Free) 200 milliGRAM(s) Oral every 6 hours PRN Cough    CAPILLARY BLOOD GLUCOSE        I&O's Summary    26 Feb 2021 07:01  -  27 Feb 2021 07:00  --------------------------------------------------------  IN: 200 mL / OUT: 650 mL / NET: -450 mL    27 Feb 2021 07:01  -  27 Feb 2021 11:52  --------------------------------------------------------  IN: 0 mL / OUT: 325 mL / NET: -325 mL        PHYSICAL EXAM:  Vital Signs Last 24 Hrs  T(C): 36.2 (27 Feb 2021 09:03), Max: 36.6 (27 Feb 2021 02:29)  T(F): 97.2 (27 Feb 2021 09:03), Max: 97.9 (27 Feb 2021 02:29)  HR: 100 (27 Feb 2021 10:00) (83 - 101)  BP: 90/59 (27 Feb 2021 09:00) (85/55 - 105/64)  BP(mean): 69 (27 Feb 2021 09:00) (64 - 81)  RR: 22 (27 Feb 2021 10:00) (16 - 31)  SpO2: 100% (27 Feb 2021 10:00) (95% - 100%)    General: WDWN, NAD, speaking in full sentences on _  HEENT: NC/AT; PERRL, anicteric sclera  Cardiovascular: NRRR; +S1/S2, no r/m/g  Respiratory: CTA B/L; no W/R/R; no retractions or accessory muscle use  Gastrointestinal: soft, NT/ND; +BSx4  Extremities: WWP; no edema, clubbing or cyanosis  Vascular: 2+ radial, DP/PT pulses B/L  Neurological: AAOx3; no focal deficits    LABS:                        11.6   19.25 )-----------( 965      ( 27 Feb 2021 05:28 )             36.4     02-27    136  |  102  |  17  ----------------------------<  95  4.2   |  22  |  0.82    Ca    9.5      27 Feb 2021 05:28  Phos  3.6     02-27  Mg     1.8     02-27    TPro  7.7  /  Alb  3.5  /  TBili  0.3  /  DBili  x   /  AST  24  /  ALT  50<H>  /  AlkPhos  124<H>  02-27              SARS-CoV-2: NotDetec (24 Feb 2021 11:55)  COVID-19 PCR: Negative (22 Feb 2021 08:06)  COVID-19 PCR: NotDetec (21 Feb 2021 09:39)  COVID-19 PCR: NotDetec (21 Feb 2021 02:30)  SARS-CoV-2: NotDetec (21 Feb 2021 00:56)  COVID-19 PCR: NotDetec (20 Feb 2021 03:31)      RADIOLOGY & ADDITIONAL TESTS:  Imaging from Last 24 Hours:    Electrocardiogram/QTc Interval:    COORDINATION OF CARE:  Care Discussed with Consultants/Other Providers:

## 2021-02-28 RX ORDER — VALSARTAN 80 MG/1
1 TABLET ORAL
Qty: 30 | Refills: 2
Start: 2021-02-28 | End: 2021-05-28

## 2021-02-28 RX ORDER — ASPIRIN/CALCIUM CARB/MAGNESIUM 324 MG
1 TABLET ORAL
Qty: 30 | Refills: 2
Start: 2021-02-28 | End: 2021-05-28

## 2021-03-01 ENCOUNTER — NON-APPOINTMENT (OUTPATIENT)
Age: 61
End: 2021-03-01

## 2021-03-01 PROCEDURE — 86705 HEP B CORE ANTIBODY IGM: CPT

## 2021-03-01 PROCEDURE — 87641 MR-STAPH DNA AMP PROBE: CPT

## 2021-03-01 PROCEDURE — 96374 THER/PROPH/DIAG INJ IV PUSH: CPT

## 2021-03-01 PROCEDURE — 94660 CPAP INITIATION&MGMT: CPT

## 2021-03-01 PROCEDURE — 85025 COMPLETE CBC W/AUTO DIFF WBC: CPT

## 2021-03-01 PROCEDURE — 86140 C-REACTIVE PROTEIN: CPT

## 2021-03-01 PROCEDURE — 85730 THROMBOPLASTIN TIME PARTIAL: CPT

## 2021-03-01 PROCEDURE — 94640 AIRWAY INHALATION TREATMENT: CPT

## 2021-03-01 PROCEDURE — 81003 URINALYSIS AUTO W/O SCOPE: CPT

## 2021-03-01 PROCEDURE — 75561 CARDIAC MRI FOR MORPH W/DYE: CPT

## 2021-03-01 PROCEDURE — 84436 ASSAY OF TOTAL THYROXINE: CPT

## 2021-03-01 PROCEDURE — C1769: CPT

## 2021-03-01 PROCEDURE — 85379 FIBRIN DEGRADATION QUANT: CPT

## 2021-03-01 PROCEDURE — 82728 ASSAY OF FERRITIN: CPT

## 2021-03-01 PROCEDURE — 82330 ASSAY OF CALCIUM: CPT

## 2021-03-01 PROCEDURE — 87635 SARS-COV-2 COVID-19 AMP PRB: CPT

## 2021-03-01 PROCEDURE — 87040 BLOOD CULTURE FOR BACTERIA: CPT

## 2021-03-01 PROCEDURE — 85384 FIBRINOGEN ACTIVITY: CPT

## 2021-03-01 PROCEDURE — 84466 ASSAY OF TRANSFERRIN: CPT

## 2021-03-01 PROCEDURE — 93306 TTE W/DOPPLER COMPLETE: CPT

## 2021-03-01 PROCEDURE — 93321 DOPPLER ECHO F-UP/LMTD STD: CPT

## 2021-03-01 PROCEDURE — C1887: CPT

## 2021-03-01 PROCEDURE — 83036 HEMOGLOBIN GLYCOSYLATED A1C: CPT

## 2021-03-01 PROCEDURE — U0005: CPT

## 2021-03-01 PROCEDURE — 93005 ELECTROCARDIOGRAM TRACING: CPT

## 2021-03-01 PROCEDURE — 83880 ASSAY OF NATRIURETIC PEPTIDE: CPT

## 2021-03-01 PROCEDURE — 80061 LIPID PANEL: CPT

## 2021-03-01 PROCEDURE — 85007 BL SMEAR W/DIFF WBC COUNT: CPT

## 2021-03-01 PROCEDURE — 86036 ANCA SCREEN EACH ANTIBODY: CPT

## 2021-03-01 PROCEDURE — C1894: CPT

## 2021-03-01 PROCEDURE — 83550 IRON BINDING TEST: CPT

## 2021-03-01 PROCEDURE — 87640 STAPH A DNA AMP PROBE: CPT

## 2021-03-01 PROCEDURE — 96375 TX/PRO/DX INJ NEW DRUG ADDON: CPT

## 2021-03-01 PROCEDURE — 86769 SARS-COV-2 COVID-19 ANTIBODY: CPT

## 2021-03-01 PROCEDURE — 80053 COMPREHEN METABOLIC PANEL: CPT

## 2021-03-01 PROCEDURE — U0003: CPT

## 2021-03-01 PROCEDURE — 86900 BLOOD TYPING SEROLOGIC ABO: CPT

## 2021-03-01 PROCEDURE — 87899 AGENT NOS ASSAY W/OPTIC: CPT

## 2021-03-01 PROCEDURE — 87449 NOS EACH ORGANISM AG IA: CPT

## 2021-03-01 PROCEDURE — 87070 CULTURE OTHR SPECIMN AEROBIC: CPT

## 2021-03-01 PROCEDURE — 84100 ASSAY OF PHOSPHORUS: CPT

## 2021-03-01 PROCEDURE — 85652 RBC SED RATE AUTOMATED: CPT

## 2021-03-01 PROCEDURE — 85610 PROTHROMBIN TIME: CPT

## 2021-03-01 PROCEDURE — 86850 RBC ANTIBODY SCREEN: CPT

## 2021-03-01 PROCEDURE — 86803 HEPATITIS C AB TEST: CPT

## 2021-03-01 PROCEDURE — 82803 BLOOD GASES ANY COMBINATION: CPT

## 2021-03-01 PROCEDURE — 82553 CREATINE MB FRACTION: CPT

## 2021-03-01 PROCEDURE — 87389 HIV-1 AG W/HIV-1&-2 AB AG IA: CPT

## 2021-03-01 PROCEDURE — A9577: CPT

## 2021-03-01 PROCEDURE — 0225U NFCT DS DNA&RNA 21 SARSCOV2: CPT

## 2021-03-01 PROCEDURE — 83605 ASSAY OF LACTIC ACID: CPT

## 2021-03-01 PROCEDURE — 85027 COMPLETE CBC AUTOMATED: CPT

## 2021-03-01 PROCEDURE — 71045 X-RAY EXAM CHEST 1 VIEW: CPT

## 2021-03-01 PROCEDURE — 83735 ASSAY OF MAGNESIUM: CPT

## 2021-03-01 PROCEDURE — 86901 BLOOD TYPING SEROLOGIC RH(D): CPT

## 2021-03-01 PROCEDURE — 99285 EMERGENCY DEPT VISIT HI MDM: CPT | Mod: 25

## 2021-03-01 PROCEDURE — 84443 ASSAY THYROID STIM HORMONE: CPT

## 2021-03-01 PROCEDURE — 86706 HEP B SURFACE ANTIBODY: CPT

## 2021-03-01 PROCEDURE — 83540 ASSAY OF IRON: CPT

## 2021-03-01 PROCEDURE — 84145 PROCALCITONIN (PCT): CPT

## 2021-03-01 PROCEDURE — 86038 ANTINUCLEAR ANTIBODIES: CPT

## 2021-03-01 PROCEDURE — 97162 PT EVAL MOD COMPLEX 30 MIN: CPT

## 2021-03-01 PROCEDURE — 84484 ASSAY OF TROPONIN QUANT: CPT

## 2021-03-01 PROCEDURE — 84481 FREE ASSAY (FT-3): CPT

## 2021-03-01 PROCEDURE — 36415 COLL VENOUS BLD VENIPUNCTURE: CPT

## 2021-03-01 PROCEDURE — 84132 ASSAY OF SERUM POTASSIUM: CPT

## 2021-03-01 PROCEDURE — 82962 GLUCOSE BLOOD TEST: CPT

## 2021-03-01 PROCEDURE — 87340 HEPATITIS B SURFACE AG IA: CPT

## 2021-03-01 PROCEDURE — 84295 ASSAY OF SERUM SODIUM: CPT

## 2021-03-01 PROCEDURE — 82550 ASSAY OF CK (CPK): CPT

## 2021-03-02 DIAGNOSIS — F17.210 NICOTINE DEPENDENCE, CIGARETTES, UNCOMPLICATED: ICD-10-CM

## 2021-03-02 DIAGNOSIS — D47.3 ESSENTIAL (HEMORRHAGIC) THROMBOCYTHEMIA: ICD-10-CM

## 2021-03-02 DIAGNOSIS — R74.01 ELEVATION OF LEVELS OF LIVER TRANSAMINASE LEVELS: ICD-10-CM

## 2021-03-02 DIAGNOSIS — R73.9 HYPERGLYCEMIA, UNSPECIFIED: ICD-10-CM

## 2021-03-02 DIAGNOSIS — I21.09 ST ELEVATION (STEMI) MYOCARDIAL INFARCTION INVOLVING OTHER CORONARY ARTERY OF ANTERIOR WALL: ICD-10-CM

## 2021-03-02 DIAGNOSIS — J96.01 ACUTE RESPIRATORY FAILURE WITH HYPOXIA: ICD-10-CM

## 2021-03-02 DIAGNOSIS — E87.3 ALKALOSIS: ICD-10-CM

## 2021-03-02 DIAGNOSIS — M19.90 UNSPECIFIED OSTEOARTHRITIS, UNSPECIFIED SITE: ICD-10-CM

## 2021-03-02 DIAGNOSIS — U07.1 COVID-19: ICD-10-CM

## 2021-03-02 DIAGNOSIS — A41.89 OTHER SPECIFIED SEPSIS: ICD-10-CM

## 2021-03-02 DIAGNOSIS — L40.9 PSORIASIS, UNSPECIFIED: ICD-10-CM

## 2021-03-02 DIAGNOSIS — Z71.89 OTHER SPECIFIED COUNSELING: ICD-10-CM

## 2021-03-02 DIAGNOSIS — I95.9 HYPOTENSION, UNSPECIFIED: ICD-10-CM

## 2021-03-02 DIAGNOSIS — J12.82 PNEUMONIA DUE TO CORONAVIRUS DISEASE 2019: ICD-10-CM

## 2021-03-02 DIAGNOSIS — G89.29 OTHER CHRONIC PAIN: ICD-10-CM

## 2021-03-02 DIAGNOSIS — I50.21 ACUTE SYSTOLIC (CONGESTIVE) HEART FAILURE: ICD-10-CM

## 2021-03-02 DIAGNOSIS — M54.40 LUMBAGO WITH SCIATICA, UNSPECIFIED SIDE: ICD-10-CM

## 2021-03-02 DIAGNOSIS — D64.9 ANEMIA, UNSPECIFIED: ICD-10-CM

## 2021-03-02 DIAGNOSIS — R06.02 SHORTNESS OF BREATH: ICD-10-CM

## 2021-03-02 LAB
SARS-COV-2 IGG SERPL IA-ACNC: 4.4 RATIO — HIGH
SARS-COV-2 IGG SERPL QL IA: POSITIVE
SARS-COV-2 IGG SERPL QL IA: POSITIVE
SARS-COV-2 IGM SERPL IA-ACNC: 1.14 RATIO — HIGH

## 2021-03-04 ENCOUNTER — APPOINTMENT (OUTPATIENT)
Dept: HEART AND VASCULAR | Facility: CLINIC | Age: 61
End: 2021-03-04

## 2021-03-04 RX ORDER — LORATADINE 10 MG/1
10 TABLET ORAL
Qty: 30 | Refills: 5 | Status: DISCONTINUED | COMMUNITY
Start: 2020-03-24 | End: 2021-03-04

## 2021-03-04 RX ORDER — TRIAMCINOLONE ACETONIDE 1 MG/G
0.1 CREAM TOPICAL TWICE DAILY
Qty: 1 | Refills: 5 | Status: DISCONTINUED | COMMUNITY
Start: 2019-02-27 | End: 2021-03-04

## 2021-03-04 RX ORDER — IBUPROFEN 600 MG/1
600 TABLET, FILM COATED ORAL
Qty: 20 | Refills: 0 | Status: DISCONTINUED | COMMUNITY
Start: 2020-05-12 | End: 2021-03-04

## 2021-03-04 RX ORDER — KRILL/OM-3/DHA/EPA/PHOSPHO/AST 1000-230MG
81 CAPSULE ORAL
Refills: 0 | Status: ACTIVE | COMMUNITY
Start: 2021-03-04

## 2021-03-04 RX ORDER — PSEUDOEPHEDRINE HCL 30 MG/1
30 TABLET, FILM COATED ORAL
Qty: 24 | Refills: 12 | Status: DISCONTINUED | COMMUNITY
Start: 2020-03-24 | End: 2021-03-04

## 2021-03-04 RX ORDER — LORATADINE 10 MG/1
10 TABLET ORAL
Qty: 30 | Refills: 5 | Status: DISCONTINUED | COMMUNITY
Start: 2020-10-01 | End: 2021-03-04

## 2021-03-04 RX ORDER — IBUPROFEN 600 MG/1
600 TABLET, FILM COATED ORAL 3 TIMES DAILY
Qty: 30 | Refills: 2 | Status: DISCONTINUED | COMMUNITY
Start: 2020-05-19 | End: 2021-03-04

## 2021-03-04 RX ORDER — ACETAMINOPHEN 500 MG/1
500 TABLET ORAL
Qty: 100 | Refills: 5 | Status: DISCONTINUED | COMMUNITY
Start: 2019-07-05 | End: 2021-03-04

## 2021-03-04 RX ORDER — ACETAMINOPHEN 500 MG/1
500 TABLET, FILM COATED ORAL
Qty: 100 | Refills: 5 | Status: DISCONTINUED | COMMUNITY
Start: 2020-12-10 | End: 2021-03-04

## 2021-03-04 RX ORDER — TICAGRELOR 90 MG/1
90 TABLET ORAL TWICE DAILY
Qty: 60 | Refills: 3 | Status: ACTIVE | COMMUNITY
Start: 2021-03-04

## 2021-03-04 RX ORDER — ATORVASTATIN CALCIUM 80 MG/1
80 TABLET, FILM COATED ORAL
Qty: 30 | Refills: 5 | Status: ACTIVE | COMMUNITY
Start: 2021-03-04

## 2021-03-04 RX ORDER — METOPROLOL SUCCINATE 25 MG/1
25 TABLET, EXTENDED RELEASE ORAL DAILY
Refills: 3 | Status: ACTIVE | COMMUNITY
Start: 2021-03-04

## 2021-03-05 ENCOUNTER — APPOINTMENT (OUTPATIENT)
Dept: HEART AND VASCULAR | Facility: CLINIC | Age: 61
End: 2021-03-05
Payer: MEDICAID

## 2021-03-05 DIAGNOSIS — I50.21 ACUTE SYSTOLIC (CONGESTIVE) HEART FAILURE: ICD-10-CM

## 2021-03-05 DIAGNOSIS — I21.3 ST ELEVATION (STEMI) MYOCARDIAL INFARCTION OF UNSPECIFIED SITE: ICD-10-CM

## 2021-03-05 DIAGNOSIS — R05 COUGH: ICD-10-CM

## 2021-03-05 DIAGNOSIS — I25.10 ATHEROSCLEROTIC HEART DISEASE OF NATIVE CORONARY ARTERY W/OUT ANGINA PECTORIS: ICD-10-CM

## 2021-03-05 PROCEDURE — 99072 ADDL SUPL MATRL&STAF TM PHE: CPT

## 2021-03-05 PROCEDURE — 99214 OFFICE O/P EST MOD 30 MIN: CPT

## 2021-03-05 RX ORDER — BENZONATATE 100 MG/1
100 CAPSULE ORAL
Qty: 90 | Refills: 0 | Status: ACTIVE | COMMUNITY
Start: 2021-03-04 | End: 1900-01-01

## 2021-03-05 RX ORDER — SACUBITRIL AND VALSARTAN 24; 26 MG/1; MG/1
24-26 TABLET, FILM COATED ORAL
Qty: 60 | Refills: 1 | Status: ACTIVE | COMMUNITY
Start: 2021-03-05 | End: 1900-01-01

## 2021-03-05 RX ORDER — LORATADINE 10 MG/1
10 TABLET ORAL
Qty: 30 | Refills: 5 | Status: ACTIVE | COMMUNITY
Start: 2021-03-04 | End: 1900-01-01

## 2021-03-05 RX ORDER — VALSARTAN 40 MG/1
40 TABLET, COATED ORAL DAILY
Refills: 0 | Status: DISCONTINUED | COMMUNITY
Start: 2021-03-04 | End: 2021-03-05

## 2021-03-05 RX ORDER — ACETAMINOPHEN 500 MG/1
500 TABLET ORAL
Qty: 100 | Refills: 5 | Status: ACTIVE | COMMUNITY
Start: 2021-03-05 | End: 1900-01-01

## 2021-03-05 NOTE — HISTORY OF PRESENT ILLNESS
[FreeTextEntry1] : Patient is a 61yo M with new onset HFrEF (EF 24% ) diagnosed on a recent admission at [facility]. His other PMH is notable for arthritis and psoriasis and presented to [facility] with 1.5 weeks of shortness of breath and chest pain. He was found to have a late presenting STEMI and acute hypoxic respiratory failure 2/2 to new onset CHF and PNA. L/RHC showed normal-low filling pressures and mLAD 99% subtotal occlusion, LAD D2: ostial 90% stenosis. LV gram: EF 15%, anterior apical wall akinesis. He was initially planned for staged Impella assisted PCI of mid LAD/D2 bifurcation however MRI revealed nonviable myocardium in the LAD territory. Patient had negative Covid PCR X 3 but remained on Covid unit 2/2 high suspicion for Covid (high inflammatory markers) and told to quarantine.  \par \par Since discharge, he feels okay overall but still is a bit fatigued. He still has a cough and some SOB but it is much better than prior to the hospital. He is able to to walk without limitations on flatground, so long as he walks slowly. The other day, he walked over 20 blocks. He does not use stairs but he is able to walk up hills but takes his time. 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  He denies CP, orthopnea, PND, LH/dizziness, abdominal discomfort, palpitations and syncope. His appetite is normal and bladder habits are normal. He hasn't had a bowel movement in 2 days but sometimes this is normal for him. He has been limiting fluid and sodium intake and taking medications as directed. He was taking Ibuprofen in the past but will stop. He does not yet have an ICD. \par \par

## 2021-03-05 NOTE — DISCUSSION/SUMMARY
[FreeTextEntry1] : 61yo M with newly diagnosed HFrEF (EF 24%) on admission to [facility] for late presenting STEMI with no intervention 2/2 nonviable myocardium in the LAD territory. He is ACC/AHA Stage C, NYHA Class II-III, i have recommended the following:\par \par 1. Acute Systolic HF- Stable and compensated with normal hemodynamics on RHC with RA 1. No diuretic requirement. Will DC Valsartan and start Entresto 24/26mg BID. Will need labs in 1 week. Continue Toprol XL 25mg daily and will uptitrate at next in person visit. HF education provided. Will consider MRA and SGLT2 inhibitor once ARNI & BB maximized. Once maximized, will repeat TTE to assess need for ICD. \par \par 2. STEMI- Late presenting STEMI with non-viable myocardium in LAD territory. Medical management. No anginal symptoms. On Aspirin, Brilinta, BB & ARB (in Entresto). \par \par 3. Anemia- Found to have Hgb 11 in hospital with baseline 15. Iron held in setting of Sepsis and to f/u with PCP. \par \par 4. Follow up in 2 weeks.

## 2021-03-16 ENCOUNTER — APPOINTMENT (OUTPATIENT)
Dept: HEART AND VASCULAR | Facility: CLINIC | Age: 61
End: 2021-03-16

## 2021-12-01 PROCEDURE — G9005: CPT
